# Patient Record
Sex: FEMALE | Race: WHITE | NOT HISPANIC OR LATINO | Employment: UNEMPLOYED | URBAN - METROPOLITAN AREA
[De-identification: names, ages, dates, MRNs, and addresses within clinical notes are randomized per-mention and may not be internally consistent; named-entity substitution may affect disease eponyms.]

---

## 2018-01-15 ENCOUNTER — TELEPHONE (OUTPATIENT)
Dept: FAMILY MEDICINE | Facility: CLINIC | Age: 28
End: 2018-01-15

## 2018-01-15 NOTE — TELEPHONE ENCOUNTER
1/15/2018    Call Regarding ReattributionPhysical    Attempt 1    Message     Comments: NOT ACCEPTING CALLS AT THIS TIME           Outreach   AT

## 2019-01-23 ENCOUNTER — RECORDS - HEALTHEAST (OUTPATIENT)
Dept: ADMINISTRATIVE | Facility: OTHER | Age: 29
End: 2019-01-23

## 2019-01-31 ENCOUNTER — COMMUNICATION - HEALTHEAST (OUTPATIENT)
Dept: TELEHEALTH | Facility: CLINIC | Age: 29
End: 2019-01-31

## 2019-01-31 ENCOUNTER — HOSPITAL ENCOUNTER (OUTPATIENT)
Dept: ULTRASOUND IMAGING | Facility: CLINIC | Age: 29
Discharge: HOME | End: 2019-01-31

## 2019-01-31 DIAGNOSIS — O22.20: ICD-10-CM

## 2019-03-21 ENCOUNTER — HOSPITAL ENCOUNTER (OUTPATIENT)
Dept: OBGYN | Facility: CLINIC | Age: 29
Discharge: HOME | End: 2019-03-21
Attending: OBSTETRICS & GYNECOLOGY | Admitting: OBSTETRICS & GYNECOLOGY

## 2019-03-21 LAB — FETAL RBC % LFV: 0 %

## 2019-04-03 ENCOUNTER — ANESTHESIA - HEALTHEAST (OUTPATIENT)
Dept: OBGYN | Facility: CLINIC | Age: 29
End: 2019-04-03

## 2019-04-05 ENCOUNTER — COMMUNICATION - HEALTHEAST (OUTPATIENT)
Dept: OBGYN | Facility: CLINIC | Age: 29
End: 2019-04-05

## 2019-04-09 ENCOUNTER — COMMUNICATION - HEALTHEAST (OUTPATIENT)
Dept: OBGYN | Facility: CLINIC | Age: 29
End: 2019-04-09

## 2019-08-05 ENCOUNTER — RECORDS - HEALTHEAST (OUTPATIENT)
Dept: LAB | Facility: CLINIC | Age: 29
End: 2019-08-05

## 2019-08-05 LAB — TSH SERPL DL<=0.005 MIU/L-ACNC: 1.29 UIU/ML (ref 0.3–5)

## 2021-05-27 NOTE — ANESTHESIA PREPROCEDURE EVALUATION
Anesthesia Evaluation      Patient summary reviewed   No history of anesthetic complications     Airway    Pulmonary    (+) asthma                           Cardiovascular   (+) hypertension, ,      Neuro/Psych - negative ROS     Endo/Other    (+) pregnant     GI/Hepatic/Renal - negative ROS           Dental              Question of HELLP upon admission, platelets fine and BP has come nder control (per RN)    Patient requesting labor epidural, chart reviewed.  Discussed risks including hypotension, nerve damage, infection, and post dural puncture headache.  Patient understands, questions answered, and agrees to proceed.  Time out instituted prior to placement. Hands washed, sterile gloves and mask worn.               Anesthesia Plan  Planned anesthetic: epidural    ASA 3     Anesthetic plan and risks discussed with: patient

## 2021-05-27 NOTE — ANESTHESIA PROCEDURE NOTES
Epidural Block    Patient location during procedure: OB  Time Called: 4/3/2019 1:59 AM  Reason for Block:labor epidural  Staffing:  Performing  Anesthesiologist: Sarwat Kent MD  Preanesthetic Checklist  Completed: patient identified, risks, benefits, and alternatives discussed, timeout performed, consent obtained, at patient's request, airway assessed, oxygen available, suction available, emergency drugs available and hand hygiene performed  Procedure  Patient position: sitting  Prep: ChloraPrep  Patient monitoring: blood pressure, heart rate and continuous pulse oximetry  Approach: midline  Location: L3-L4  Injection technique: KIRBY saline (PFNS)  Number of Attempts:1  Needle  Needle type: [employer]virgie   Needle gauge: 18 G     Catheter in Space: 4      Additional Notes:  Negative CSF, heme, paresthesia

## 2021-05-27 NOTE — ANESTHESIA POSTPROCEDURE EVALUATION
Patient: Amada Melo  * No procedures listed *  Anesthesia type: epidural    Patient location: PACU  Last vitals:   Vitals:    04/03/19 1536   BP: 156/87   Pulse: 86   Resp: 16   Temp: 36.7  C (98  F)   SpO2:      Post vital signs: stable  Level of consciousness: awake and responds to simple questions  Post-anesthesia pain: pain controlled  Post-anesthesia nausea and vomiting: no  Pulmonary: unassisted, return to baseline  Cardiovascular: stable and blood pressure at baseline  Hydration: adequate  Anesthetic events: no    QCDR Measures:  ASA# 11 - Aggie-op Cardiac Arrest: ASA11B - Patient did NOT experience unanticipated cardiac arrest  ASA# 12 - Aggie-op Mortality Rate: ASA12B - Patient did NOT die  ASA# 13 - PACU Re-Intubation Rate: ASA13B - Patient did NOT require a new airway mgmt  ASA# 10 - Composite Anes Safety: ASA10A - No serious adverse event    Additional Notes:

## 2021-05-27 NOTE — TELEPHONE ENCOUNTER
Location   NURSERY  Provider Beatrice Tay MD    :   N/A    Language:   English    Discharge Follow-Up:  Follow-Up call by Outreach nurse: Call placed    Type of Delivery:      Feeding Method:  Breastfeeding/Formula    Feedings in 24Hrs:  >8x/Day    Breast engorgement:  No    Nipple tenderness:   No    Non-nursing engorgement discussed:   N/A    Amount of Feedin-2 oz    Number of Infant Stools in 24 hours:   >3    Stool:  Transition    Number of Infant voids in 24 hours:  >3    Urine:  Clear    Infant Jaundice:   None    Discussed increasing s/s of Jaundice:   Yes    Circumcision:   N/A    Maternal s/s of infection:   None    Maternal s/s of PIH:   WDL    Postpartum cramping:   Mild    Comfort:  Adequate with routine pain meds    Vaginal Bleeding:  WDL    S/S of Maternal Thrombosis:  None    Maternal BM Since Delivery:  Yes    Referrals:   None    Resources Used During Phone Call:  HEPS    Comments:   Spoke with pt/mom and she states things are going well. NB went to the Dr today and mom states there were no concerns. BF well. Mom states she supplemented over night x1. No concerns with jaundice. NB is voiding/stooling. Mom denies any concerns for herself. Edu discussed. Questions answered.    Completed by:   Caroline Fontaine RN

## 2021-05-27 NOTE — TELEPHONE ENCOUNTER
:   N/A    Language:   English    Discharge Follow-Up:  Follow-Up call by Outreach nurse: Call placed    Type of Delivery:      Feeding Method:  Breastfeeding/Formula    Feedings in 24Hrs:  >8x/Day    Breast engorgement:   No    Nipple tenderness:   Yes    Non-nursing engorgement discussed:   N/A    Amount of Feedin-2 oz    Number of Infant Stools in 24 hours:   >3    Stool:  Normal    Number of Infant voids in 24 hours:  >3    Urine:  Clear    Infant Jaundice:   None    Discussed increasing s/s of Jaundice:   Yes    Circumcision:   N/A    Maternal s/s of infection:   None    Maternal s/s of PIH:   WDL    Postpartum cramping:   None    Comfort:  Adequate with routine pain meds    Vaginal Bleeding:  WDL    S/S of Maternal Thrombosis:  None    Maternal BM Since Delivery:  Yes    Referrals:   None    Resources Used During Phone Call:  HEPS    Comments:   Mom states that things are going well. BF/ROSA MARIA feeding. NO concerns with jaundice. Mom states she is feeling well. Voiding/stooling. Edu discussed. Questions answered    Completed by:   Caroline Fontaine RN

## 2021-06-01 ENCOUNTER — RECORDS - HEALTHEAST (OUTPATIENT)
Dept: ADMINISTRATIVE | Facility: CLINIC | Age: 31
End: 2021-06-01

## 2021-06-02 ENCOUNTER — RECORDS - HEALTHEAST (OUTPATIENT)
Dept: ADMINISTRATIVE | Facility: CLINIC | Age: 31
End: 2021-06-02

## 2021-06-25 NOTE — PLAN OF CARE
[place] [facility] [place] was instructed to come in for evaluation by [facility] following a fall at 0900 today. States she slipped on the ice while picking her son up from school. She fell on her hip/back and says she had no abdominal contact. Reports no change in contractions from the occasional one she is used to feeling at 37 weeks. No leaking fluid, no bleeding. Normal fetal movement. Dr. Braxton updated. Plan to obtain Kleihauer-Betke and discharge to [facility] for BPP. Pt in agreement with plan.

## 2021-08-08 ENCOUNTER — HEALTH MAINTENANCE LETTER (OUTPATIENT)
Age: 31
End: 2021-08-08

## 2021-10-04 ENCOUNTER — HEALTH MAINTENANCE LETTER (OUTPATIENT)
Age: 31
End: 2021-10-04

## 2022-09-11 ENCOUNTER — HEALTH MAINTENANCE LETTER (OUTPATIENT)
Age: 32
End: 2022-09-11

## 2022-11-10 ENCOUNTER — HOSPITAL ENCOUNTER (EMERGENCY)
Facility: HOSPITAL | Age: 32
Discharge: HOME | End: 2022-11-10
Attending: EMERGENCY MEDICINE | Admitting: EMERGENCY MEDICINE
Payer: COMMERCIAL

## 2022-11-10 ENCOUNTER — APPOINTMENT (OUTPATIENT)
Dept: RADIOLOGY | Facility: HOSPITAL | Age: 32
End: 2022-11-10
Attending: EMERGENCY MEDICINE
Payer: COMMERCIAL

## 2022-11-10 VITALS
WEIGHT: 250 LBS | HEART RATE: 99 BPM | HEIGHT: 71 IN | SYSTOLIC BLOOD PRESSURE: 139 MMHG | OXYGEN SATURATION: 97 % | RESPIRATION RATE: 20 BRPM | DIASTOLIC BLOOD PRESSURE: 84 MMHG | TEMPERATURE: 98.5 F | BODY MASS INDEX: 35 KG/M2

## 2022-11-10 DIAGNOSIS — J10.1 INFLUENZA A: ICD-10-CM

## 2022-11-10 DIAGNOSIS — J21.0 RSV BRONCHIOLITIS: ICD-10-CM

## 2022-11-10 LAB
FLUAV RNA SPEC QL NAA+PROBE: POSITIVE
FLUBV RNA RESP QL NAA+PROBE: NEGATIVE
HCG SERPL QL: POSITIVE
RSV RNA SPEC NAA+PROBE: POSITIVE
SARS-COV-2 RNA RESP QL NAA+PROBE: NEGATIVE

## 2022-11-10 PROCEDURE — 99284 EMERGENCY DEPT VISIT MOD MDM: CPT | Mod: CS,25

## 2022-11-10 PROCEDURE — 71045 X-RAY EXAM CHEST 1 VIEW: CPT

## 2022-11-10 PROCEDURE — 84703 CHORIONIC GONADOTROPIN ASSAY: CPT | Performed by: EMERGENCY MEDICINE

## 2022-11-10 PROCEDURE — 36415 COLL VENOUS BLD VENIPUNCTURE: CPT | Performed by: EMERGENCY MEDICINE

## 2022-11-10 PROCEDURE — 87637 SARSCOV2&INF A&B&RSV AMP PRB: CPT | Performed by: EMERGENCY MEDICINE

## 2022-11-10 PROCEDURE — C9803 HOPD COVID-19 SPEC COLLECT: HCPCS

## 2022-11-10 RX ORDER — GUAIFENESIN 600 MG/1
1200 TABLET, EXTENDED RELEASE ORAL 2 TIMES DAILY
Qty: 15 TABLET | Refills: 0 | Status: SHIPPED | OUTPATIENT
Start: 2022-11-10 | End: 2022-12-18

## 2022-11-10 NOTE — ED TRIAGE NOTES
Patient says that her family has been sick off and on since September.  Pt has had flu symptoms with cough and fever this time for 3 days.  Did have a + pregnancy test a week ago.  Last period she remembers was end of August.  Unsure how far along she is--her partner has had a vasectomy.      Triage Assessment     Row Name 11/10/22 1126       Triage Assessment (Adult)    Airway WDL WDL       Respiratory WDL    Respiratory WDL X;cough       Skin Circulation/Temperature WDL    Skin Circulation/Temperature WDL WDL       Cardiac WDL    Cardiac WDL WDL       Peripheral/Neurovascular WDL    Peripheral Neurovascular WDL WDL       Cognitive/Neuro/Behavioral WDL    Cognitive/Neuro/Behavioral WDL WDL

## 2022-11-10 NOTE — ED PROVIDER NOTES
EMERGENCY DEPARTMENT ENCOUNTER      NAME: Amada Melo  AGE: 31 year old female  YOB: 1990  MRN: [id number]  EVALUATION DATE & TIME: 11/10/2022  1:28 PM    PCP: No Ref-Primary, Physician    ED PROVIDER: Joe Moore D.O.      Chief Complaint   Patient presents with     Flu Symptoms       FINAL IMPRESSION:  1. Influenza A    2. RSV bronchiolitis        ED COURSE & MEDICAL DECISION MAKIN:52 PM I met with the patient to gather history and to perform my initial exam. I discussed the plan for care while in the Emergency Department.  2:09 PM Patient is set to be discharged. Patient notes that she has albuterol at home and didn't want any.        Pertinent Labs & Imaging studies reviewed. (See chart for details)  31 year old female presents to the Emergency Department for evaluation of cough, body aches, sore throat, fevers.  Patient is known to be pregnant, and is slightly nauseous, though she does report this is, with her pregnancy.  She was not tachycardic on my exam, and her lung sounds were clear.  There is no clinical concern for pneumonia, and her chest x-ray was also unremarkable.  Swab testing does show influenza a as well as RSV positive testing with negative testing for influenza B and COVID-19.  At this time I do suspect that it is influenza potentially concomitantly with the RSV resulting in her symptoms.  I do not believe further intervention is indicated.  If she is pregnant, cannot discharge with guaifenesin with codeine, but did discharge with the guaifenesin.  She does have albuterol at home for symptomatic relief.  She is outside the window for Tamiflu.  She will follow-up as an outpatient with her OB/GYN.  Return precautions discussed.        At the conclusion of the encounter I discussed the results of all of the tests and the disposition. The questions were answered. The patient or family acknowledged understanding and was agreeable with the care plan.        HPI    Patient  information was obtained from: Patient     Use of : N/A      Amadabarbara Melo is a 31 year old female with a history of asthma who presents to the ED via walk in for the evaluation of flu symptoms.     The patient started feeling sick 3-4 days ago with a fever, cough, congestion and sore throat.  She also endorse nausea, vomiting and diarrhea along with a rash in her arms. She notes that she's been intermittently sick for the past month. She is currently 11-12 weeks pregnant and had her last period in August.         REVIEW OF SYSTEMS  Constitutional:  Denies chills, weight loss or weakness. Endorses fever.   Eyes:  No pain, discharge, redness  HENT:  Denies ear pain. Endorses sore throat and congestion.   Respiratory: No SOB, wheeze. Endorses cough  Cardiovascular:  No CP, palpitations  GI:  Denies abdominal pain. Endorses nausea, vomiting, and diarrhea.  : Denies dysuria, hematuria  Musculoskeletal:  Denies any new muscle/joint pain, swelling or loss of function.  Skin:  Denies pallor. Endorses rash.   Neurologic:  Denies headache, focal weakness or sensory changes  Lymph: Denies swollen nodes    All other systems negative unless noted in HPI.    PAST MEDICAL HISTORY:  Past Medical History:   Diagnosis Date     ADD (attention deficit disorder)      Genital herpes      PTSD (post-traumatic stress disorder)      Uncomplicated asthma        PAST SURGICAL HISTORY:  Past Surgical History:   Procedure Laterality Date     ENT SURGERY       TONSILLECTOMY       WISDOM TOOTH EXTRACTION           CURRENT MEDICATIONS:    No current facility-administered medications for this encounter.     Current Outpatient Medications   Medication     guaiFENesin (MUCINEX) 600 MG 12 hr tablet     albuterol (PROAIR HFA, PROVENTIL HFA, VENTOLIN HFA) 108 (90 BASE) MCG/ACT inhaler         ALLERGIES:  Allergies   Allergen Reactions     Penicillins      Steri Strips        FAMILY HISTORY:  Family History   Problem Relation Age of  "Onset     Depression Mother      Depression Father      Asthma Mother      Cervical Cancer Mother      Cerebrovascular Disease Maternal Grandmother      Hypercalcemia Paternal Grandmother      Hypertension Paternal Grandmother        SOCIAL HISTORY:  Social History     Socioeconomic History     Marital status: Legally      Spouse name: seperated for 4 years     Number of children: 1   Occupational History     Occupation: unemployed     Employer: [employer]   Tobacco Use     Smoking status: Former     Packs/day: 0.50     Types: Cigarettes     Quit date: 2018     Years since quittin.1     Smokeless tobacco: Never     Tobacco comments:     5 cigs per day---also does e-cig   Substance and Sexual Activity     Alcohol use: Yes     Alcohol/week: 0.0 standard drinks     Comment: Alcoholic Drinks/day: \"sometimes\" - not in pregnancy     Drug use: No     Sexual activity: Not Currently       VITALS:  Patient Vitals for the past 24 hrs:   BP Temp Temp src Pulse Resp SpO2 Height Weight   11/10/22 1125 (!) 143/93 98.5  F (36.9  C) Oral 113 18 98 % 1.803 m (5' 11\") 113.4 kg (250 lb)       PHYSICAL EXAM    VITAL SIGNS: BP (!) 143/93   Pulse 113   Temp 98.5  F (36.9  C) (Oral)   Resp 18   Ht 1.803 m (5' 11\")   Wt 113.4 kg (250 lb)   SpO2 98%   BMI 34.87 kg/m      General Appearance: Well-nourished, no acute distress. Non-toxic appearing.   Head:  Normocephalic, without obvious abnormality, atraumatic  Eyes:  PERRL, conjunctiva/corneas clear, EOM's intact,  ENT:  Lips, mucosa, and tongue normal, membranes are moist without pallor  Neck:  Normal ROM, symmetrical, trachea midline    Chest:  No tenderness or deformity, no crepitus  Cardio:  Regular rate and rhythm, no murmur, rub or gallop, 2+ pulses symmetric in all extremities  Pulm:  Clear to auscultation bilaterally, respirations unlabored,  Back:  ROM normal, no CVA tenderness, no spinal tenderness, no paraspinal tenderness  Abdomen:  Soft, non-tender, no " rebound or guarding.  Musculoskeletal: Full ROM, no edema, no cyanosis, good ROM of major joints  Integument:  Warm, Dry, No erythema, No rash.    Neurologic:  Alert & oriented.  No focal deficits appreciated.  Ambulatory.  Psychiatric:  Affect normal, Judgment normal, Mood normal.      LABS  Results for orders placed or performed during the hospital encounter of 11/10/22 (from the past 24 hour(s))   Symptomatic; Unknown Influenza A/B & SARS-CoV2 (COVID-19) Virus PCR Multiplex Nasopharyngeal    Specimen: Nasopharyngeal; Swab   Result Value Ref Range    Influenza A PCR Positive (A) Negative    Influenza B PCR Negative Negative    RSV PCR Positive (A) Negative    SARS CoV2 PCR Negative Negative    Narrative    Testing was performed using the Xpert Xpress CoV2/Flu/RSV Assay on the [employer] Instrument. This test should be ordered for the detection of SARS-CoV-2 and influenza viruses in individuals who meet clinical and/or epidemiological criteria. Test performance is unknown in asymptomatic patients. This test is for in vitro diagnostic use under the FDA EUA for laboratories certified under CLIA to perform high or moderate complexity testing. This test has not been FDA cleared or approved. A negative result does not rule out the presence of PCR inhibitors in the specimen or target RNA in concentration below the limit of detection for the assay. If only one viral target is positive but coinfection with multiple targets is suspected, the sample should be re-tested with another FDA cleared, approved, or authorized test, if coinfection would change clinical management. This test was validated by the [facility]. These laboratories are certified under the Clinical Laboratory Improvement Amendments of 1988 (CLIA-88) as qualified to perform high complexity laboratory testing.   XR Chest 1 View    Narrative    EXAM: XR CHEST 1 VIEW  LOCATION: [facility]  DATE/TIME: 11/10/2022  12:43 PM    INDICATION: Cough for one week. Pregnancy.  COMPARISON: 10/07/2010.      Impression    IMPRESSION: Negative chest. Lungs are clear. Normal heart size.       HCG QUALitative pregnancy (blood)   Result Value Ref Range    hCG Serum Qualitative Positive (A) Negative         RADIOLOGY  XR Chest 1 View   Final Result   IMPRESSION: Negative chest. Lungs are clear. Normal heart size.                   MEDICATIONS GIVEN IN THE EMERGENCY:  Medications - No data to display    NEW PRESCRIPTIONS STARTED AT TODAY'S ER VISIT  New Prescriptions    GUAIFENESIN (MUCINEX) 600 MG 12 HR TABLET    Take 2 tablets (1,200 mg) by mouth 2 times daily        I, Leonard Aj, am serving as a scribe to document services personally performed by Jeo Moore D.O. based on my observation and the provider's statements to me. IJoe D.O. attest that Leonard Aj is acting in a scribe capacity, has observed my performance of the services and has documented them in accordance with my direction.    Joe Moore D.O.  Emergency Medicine  [facility] EMERGENCY DEPARTMENT  [address] [place] [id number]  [phone]  Dept: [phone]     Joe Moore DO  11/10/22 9741

## 2022-11-15 ENCOUNTER — LAB REQUISITION (OUTPATIENT)
Dept: LAB | Facility: CLINIC | Age: 32
End: 2022-11-15

## 2022-11-15 ENCOUNTER — MEDICAL CORRESPONDENCE (OUTPATIENT)
Dept: HEALTH INFORMATION MANAGEMENT | Facility: CLINIC | Age: 32
End: 2022-11-15

## 2022-11-15 ENCOUNTER — TRANSFERRED RECORDS (OUTPATIENT)
Dept: MATERNAL FETAL MEDICINE | Facility: HOSPITAL | Age: 32
End: 2022-11-15

## 2022-11-15 DIAGNOSIS — Z34.81 ENCOUNTER FOR SUPERVISION OF OTHER NORMAL PREGNANCY, FIRST TRIMESTER: ICD-10-CM

## 2022-11-15 LAB
ABO/RH(D): NORMAL
ANTIBODY SCREEN: NEGATIVE
BASOPHILS # BLD AUTO: 0 10E3/UL (ref 0–0.2)
BASOPHILS NFR BLD AUTO: 0 %
EOSINOPHIL # BLD AUTO: 0.1 10E3/UL (ref 0–0.7)
EOSINOPHIL NFR BLD AUTO: 2 %
ERYTHROCYTE [DISTWIDTH] IN BLOOD BY AUTOMATED COUNT: 11.9 % (ref 10–15)
HCT VFR BLD AUTO: 35.6 % (ref 35–47)
HGB BLD-MCNC: 12.2 G/DL (ref 11.7–15.7)
IMM GRANULOCYTES # BLD: 0 10E3/UL
IMM GRANULOCYTES NFR BLD: 0 %
LYMPHOCYTES # BLD AUTO: 1.2 10E3/UL (ref 0.8–5.3)
LYMPHOCYTES NFR BLD AUTO: 27 %
MCH RBC QN AUTO: 31.3 PG (ref 26.5–33)
MCHC RBC AUTO-ENTMCNC: 34.3 G/DL (ref 31.5–36.5)
MCV RBC AUTO: 91 FL (ref 78–100)
MONOCYTES # BLD AUTO: 0.4 10E3/UL (ref 0–1.3)
MONOCYTES NFR BLD AUTO: 9 %
NEUTROPHILS # BLD AUTO: 2.8 10E3/UL (ref 1.6–8.3)
NEUTROPHILS NFR BLD AUTO: 62 %
NRBC # BLD AUTO: 0 10E3/UL
NRBC BLD AUTO-RTO: 0 /100
PLAT MORPH BLD: NORMAL
PLATELET # BLD AUTO: 169 10E3/UL (ref 150–450)
RBC # BLD AUTO: 3.9 10E6/UL (ref 3.8–5.2)
RBC MORPH BLD: NORMAL
SPECIMEN EXPIRATION DATE: NORMAL
WBC # BLD AUTO: 4.6 10E3/UL (ref 4–11)

## 2022-11-15 PROCEDURE — 87389 HIV-1 AG W/HIV-1&-2 AB AG IA: CPT | Performed by: NURSE PRACTITIONER

## 2022-11-15 PROCEDURE — 86762 RUBELLA ANTIBODY: CPT | Performed by: NURSE PRACTITIONER

## 2022-11-15 PROCEDURE — 86780 TREPONEMA PALLIDUM: CPT | Performed by: NURSE PRACTITIONER

## 2022-11-15 PROCEDURE — 86850 RBC ANTIBODY SCREEN: CPT | Performed by: NURSE PRACTITIONER

## 2022-11-15 PROCEDURE — 85025 COMPLETE CBC W/AUTO DIFF WBC: CPT | Performed by: NURSE PRACTITIONER

## 2022-11-15 PROCEDURE — 86901 BLOOD TYPING SEROLOGIC RH(D): CPT | Performed by: NURSE PRACTITIONER

## 2022-11-15 PROCEDURE — 86803 HEPATITIS C AB TEST: CPT | Performed by: NURSE PRACTITIONER

## 2022-11-15 PROCEDURE — 87340 HEPATITIS B SURFACE AG IA: CPT | Performed by: NURSE PRACTITIONER

## 2022-11-16 ENCOUNTER — HOSPITAL ENCOUNTER (EMERGENCY)
Facility: HOSPITAL | Age: 32
Discharge: HOME | End: 2022-11-16
Admitting: STUDENT IN AN ORGANIZED HEALTH CARE EDUCATION/TRAINING PROGRAM
Payer: COMMERCIAL

## 2022-11-16 ENCOUNTER — APPOINTMENT (OUTPATIENT)
Dept: RADIOLOGY | Facility: HOSPITAL | Age: 32
End: 2022-11-16
Attending: PHYSICIAN ASSISTANT
Payer: COMMERCIAL

## 2022-11-16 VITALS
DIASTOLIC BLOOD PRESSURE: 80 MMHG | RESPIRATION RATE: 16 BRPM | OXYGEN SATURATION: 100 % | BODY MASS INDEX: 34.87 KG/M2 | TEMPERATURE: 98.1 F | WEIGHT: 250 LBS | HEART RATE: 85 BPM | SYSTOLIC BLOOD PRESSURE: 138 MMHG

## 2022-11-16 DIAGNOSIS — L03.032 PARONYCHIA OF TOE, LEFT: ICD-10-CM

## 2022-11-16 DIAGNOSIS — M79.675 GREAT TOE PAIN, LEFT: ICD-10-CM

## 2022-11-16 LAB
HBV SURFACE AG SERPL QL IA: NONREACTIVE
HCV AB SERPL QL IA: NONREACTIVE
HIV 1+2 AB+HIV1 P24 AG SERPL QL IA: NONREACTIVE
RUBV IGG SERPL QL IA: 2.48 INDEX
RUBV IGG SERPL QL IA: POSITIVE
T PALLIDUM AB SER QL: NONREACTIVE

## 2022-11-16 PROCEDURE — 10060 I&D ABSCESS SIMPLE/SINGLE: CPT

## 2022-11-16 PROCEDURE — 73630 X-RAY EXAM OF FOOT: CPT | Mod: LT

## 2022-11-16 PROCEDURE — 250N000013 HC RX MED GY IP 250 OP 250 PS 637: Performed by: PHYSICIAN ASSISTANT

## 2022-11-16 PROCEDURE — 99283 EMERGENCY DEPT VISIT LOW MDM: CPT

## 2022-11-16 RX ORDER — OXYCODONE HYDROCHLORIDE 5 MG/1
5 TABLET ORAL ONCE
Status: COMPLETED | OUTPATIENT
Start: 2022-11-16 | End: 2022-11-16

## 2022-11-16 RX ORDER — ACETAMINOPHEN 325 MG/1
650 TABLET ORAL ONCE
Status: COMPLETED | OUTPATIENT
Start: 2022-11-16 | End: 2022-11-16

## 2022-11-16 RX ORDER — CEPHALEXIN 500 MG/1
500 CAPSULE ORAL 4 TIMES DAILY
Qty: 28 CAPSULE | Refills: 0 | Status: SHIPPED | OUTPATIENT
Start: 2022-11-16 | End: 2022-11-23

## 2022-11-16 RX ORDER — CEPHALEXIN 500 MG/1
500 CAPSULE ORAL ONCE
Status: COMPLETED | OUTPATIENT
Start: 2022-11-16 | End: 2022-11-16

## 2022-11-16 RX ADMIN — ACETAMINOPHEN 650 MG: 325 TABLET, FILM COATED ORAL at 21:19

## 2022-11-16 RX ADMIN — OXYCODONE HYDROCHLORIDE 5 MG: 5 TABLET ORAL at 21:58

## 2022-11-16 RX ADMIN — CEPHALEXIN 500 MG: 500 CAPSULE ORAL at 21:58

## 2022-11-16 ASSESSMENT — ENCOUNTER SYMPTOMS: FEVER: 0

## 2022-11-16 NOTE — Clinical Note
Amada Melo was seen and treated in our emergency department on 11/16/2022.  She may return to work on 11/19/2022.       If you have any questions or concerns, please don't hesitate to call.      Tess Hough PA-C

## 2022-11-17 NOTE — ED NOTES
Patient was given pain medicine and returned to the waiting room out of this RN's care in stable condition.

## 2022-11-17 NOTE — ED TRIAGE NOTES
Pt tripped on a pumpkin tonight and got some of it stuck underneath her big left toe. States it is difficult and painful to walk.

## 2022-11-17 NOTE — ED PROVIDER NOTES
EMERGENCY DEPARTMENT ENCOUNTER      NAME: Amada Melo  AGE: 31 year old female  YOB: 1990  MRN: [id number]  EVALUATION DATE & TIME: No admission date for patient encounter.    PCP: No Ref-Primary, Physician    ED PROVIDER: Tess Hough PA-C      Chief Complaint   Patient presents with     Toe Pain         FINAL IMPRESSION:  1. Great toe pain, left    2. Paronychia of toe, left          ED COURSE & MEDICAL DECISION MAKING:    I introduced myself to patient, performed initial HPI and examination.   9:49 PM Performed digital block and examined for foreign body; Plan for one dose oral oxycodone and antibiotics (Keflex) and discharge with further prescription of antibiotics to be picked up tomorrow.     31 year old female with PMH of PTSD and anxiety presents to the Emergency Department for evaluation of toe pain. Patient accidentally kicked a pumpkin yesterday, removed a sliver from it. Has been having worsening pain since that time. \Incidentally patient is 10 week pregnant.     Differential includes fracture, contusion, nail injury, retained foreign body, infection/paronychia.    Exam with significantly tender left great toe, nail is intact with no obvious foreign body, slight crusting at distal nail. No notable fluctuance. Does have faint erythema extending up lateral toe. ROM intact.   XR shows no obvious foreign body, no fracture.     Digital block was performed and tweezers used to brush along distal nail. Small amount of debris was removed followed by purulent drainage. Small amount of purulence was expressed to the best of my ability with palpation. No notable fluctuance and patient would not benefit from full I&D at this time. Will discharge with antibiotics with concern for developing cellulitis.     Instructed on at home supportive management with warm, soapy soaks. Instructed on close follow up with PCP for re-check and red flags/indications to return to the emergency department.  First dose of antibiotics and one dose of oxycodone was given to the patient in the ED. Will not be discharged with oxycodone.    MEDICATIONS GIVEN IN THE EMERGENCY:  Medications   acetaminophen (TYLENOL) tablet 650 mg (650 mg Oral Given 11/16/22 2119)   oxyCODONE (ROXICODONE) tablet 5 mg (5 mg Oral Given 11/16/22 2158)   cephALEXin (KEFLEX) capsule 500 mg (500 mg Oral Given 11/16/22 2158)       NEW PRESCRIPTIONS STARTED AT TODAY'S ER VISIT  New Prescriptions    CEPHALEXIN (KEFLEX) 500 MG CAPSULE    Take 1 capsule (500 mg) by mouth 4 times daily for 7 days          =================================================================    HPI    Patient information was obtained from: Patient    Use of : N/A       Amada SANGEETA Melo is a 31 year old female with a pertinent history of PTSD and anxiety who presents to this ED via wheelchair for evaluation of toe pain.    Patient kicked a pumpkin 24 hours ago, having the worst pain she has felt in her toe since then. Using a post-op shoe which she had from a prior injury, no improvement. Does report pulling a sliver of pumpkin from underneath her toenail.    Patient is 10 weeks pregnant. Has not taken any pain medicine.   Allergy to Amoxicillin.        REVIEW OF SYSTEMS   Review of Systems   Constitutional: Negative for fever.   Musculoskeletal:        Left great toe pain   Skin:        Left great toenail injury   All other systems reviewed and are negative.       PAST MEDICAL HISTORY:  Past Medical History:   Diagnosis Date     ADD (attention deficit disorder)      Genital herpes      PTSD (post-traumatic stress disorder)      Uncomplicated asthma        PAST SURGICAL HISTORY:  Past Surgical History:   Procedure Laterality Date     ENT SURGERY       TONSILLECTOMY       WISDOM TOOTH EXTRACTION         CURRENT MEDICATIONS:    cephALEXin (KEFLEX) 500 MG capsule  albuterol (PROAIR HFA, PROVENTIL HFA, VENTOLIN HFA) 108 (90 BASE) MCG/ACT inhaler  guaiFENesin (MUCINEX)  "600 MG 12 hr tablet        ALLERGIES:  Allergies   Allergen Reactions     Penicillins      Steri Strips        FAMILY HISTORY:  Family History   Problem Relation Age of Onset     Depression Mother      Depression Father      Asthma Mother      Cervical Cancer Mother      Cerebrovascular Disease Maternal Grandmother      Hypercalcemia Paternal Grandmother      Hypertension Paternal Grandmother        SOCIAL HISTORY:   Social History     Socioeconomic History     Marital status: Legally      Spouse name: seperated for 4 years     Number of children: 1   Occupational History     Occupation: unemployed     Employer: [employer]   Tobacco Use     Smoking status: Former     Packs/day: 0.50     Types: Cigarettes     Quit date: 2018     Years since quittin.2     Smokeless tobacco: Never     Tobacco comments:     5 cigs per day---also does e-cig   Substance and Sexual Activity     Alcohol use: Yes     Alcohol/week: 0.0 standard drinks     Comment: Alcoholic Drinks/day: \"sometimes\" - not in pregnancy     Drug use: No     Sexual activity: Not Currently       VITALS:  BP (!) 140/84   Pulse 96   Temp 97.6  F (36.4  C) (Temporal)   Resp 18   Wt 113.4 kg (250 lb)   SpO2 100%   BMI 34.87 kg/m      PHYSICAL EXAM    Constitutional: Well developed, Well nourished, NAD, GCS 15   HENT: Normocephalic, Atraumatic  Neck- Full ROM  Eyes: Conjunctiva normal.   Respiratory: Speaking in full sentences, No respiratory distress  Cardiovascular: Normal heart rate, Regular rhythm. Dorsalis pedis and posterior tibialis pulses intact and symmetric  Musculoskeletal: No deformities, No notable swelling to left great toe; ROM intact. Significant tenderness to palpation.   Integument: Erythema to distal left toe. Slight crusting to distal nail. Subtle erythematous streak along the medial toe. No fluctuance or active drainage.   Neurologic: Alert & oriented x 3, Normal sensory function. No focal deficits.   Psychiatric: Affect " normal, Judgment normal, Mood normal. Cooperative.      LAB:  All pertinent labs reviewed and interpreted.  Results for orders placed or performed during the hospital encounter of 11/16/22   Foot XR, G/E 3 views, left    Impression    IMPRESSION: Normal joint spaces and alignment. No fracture.       RADIOLOGY:  Reviewed all pertinent imaging. Please see official radiology report.  Foot XR, G/E 3 views, left   Final Result   IMPRESSION: Normal joint spaces and alignment. No fracture.          PROCEDURES:     PROCEDURE: Digital Block   INDICATIONS: Left great toe injury   PROCEDURE PROVIDER: Tess Hough PA-C     SITE: Left great toe   MEDICATION: 1% Lidocaine   NOTE: The skin overlying the site for injection was prepped with chlorhexidine.  Needle was inserted in a standard three point injection pattern.  Each time the area was aspirated and there was no return of blood.  I then injected the medication at the base of the digit.  A total of 3ml of the medication was injected.  The patient had adequate analgesic response to the procedure   COMPLICATIONS: None     PROCEDURE: Incision and Drainage   INDICATIONS: Paronychia   PROCEDURE PROVIDER: Tess Hough PA-C   SITE: Left great toe   MEDICATION: 3 mLs of 1% Lidocaine without epinephrine via digital block as above   NOTE: Local anesthetic was injected subcutaneously with anesthesia effects demonstrated prior to proceeding.  The area of maximal fluctuance was scraped gently using an Adson's with small debris removed and return of small purulent drainage.   COMPLEXITY: Simple    Simple = single, furuncle, paronychia, superficial  Complex = multiple or abscess requiring probing, loculations, packing placement   COMPLICATIONS: Patient tolerated procedure well, without complication           Daylin PIMENTEL, am serving as a scribe to document services personally performed by Tess Hough PA-C based on my observation and the provider's statements to me. Tess PIMENTEL  CASSIE Hough, attest that Daylin Walter is acting in a scribe capacity, has observed my performance of the services and has documented them in accordance with my direction.    Tess Hough PA-C  Emergency Medicine  [facility] EMERGENCY DEPARTMENT  [address] [place] [id number]  [phone]           Tess Hough PA-C  11/16/22 1261

## 2022-11-17 NOTE — DISCHARGE INSTRUCTIONS
Do frequent warm, soapy soaks for the toe.  Try to stay off the foot as much as possible.     Use tylenol as needed for pain.    Start the antibiotics as prescribed: Keflex 500 mg 4 times daily for 7 days. You were given your first dose in the emergency department, next dose will be tomorrow morning.    Follow up in clinic in 2 days for re-check.  Return to the emergency department if you develop fevers, worsening/changing pain, redness, swelling, or any other concerning symptoms. We would be happy to see you.

## 2022-11-21 ENCOUNTER — TRANSCRIBE ORDERS (OUTPATIENT)
Dept: MATERNAL FETAL MEDICINE | Facility: HOSPITAL | Age: 32
End: 2022-11-21

## 2022-11-21 DIAGNOSIS — O26.90 PREGNANCY RELATED CONDITION, ANTEPARTUM: Primary | ICD-10-CM

## 2022-11-30 ENCOUNTER — PRE VISIT (OUTPATIENT)
Dept: MATERNAL FETAL MEDICINE | Facility: CLINIC | Age: 32
End: 2022-11-30

## 2022-12-02 ENCOUNTER — OFFICE VISIT (OUTPATIENT)
Dept: MATERNAL FETAL MEDICINE | Facility: CLINIC | Age: 32
End: 2022-12-02
Attending: NURSE PRACTITIONER
Payer: COMMERCIAL

## 2022-12-02 ENCOUNTER — TRANSFERRED RECORDS (OUTPATIENT)
Dept: HEALTH INFORMATION MANAGEMENT | Facility: CLINIC | Age: 32
End: 2022-12-02

## 2022-12-02 ENCOUNTER — HOSPITAL ENCOUNTER (OUTPATIENT)
Dept: ULTRASOUND IMAGING | Facility: CLINIC | Age: 32
Discharge: HOME | End: 2022-12-02
Attending: NURSE PRACTITIONER
Payer: COMMERCIAL

## 2022-12-02 DIAGNOSIS — O26.90 PREGNANCY RELATED CONDITION, ANTEPARTUM: ICD-10-CM

## 2022-12-02 DIAGNOSIS — Z92.89: ICD-10-CM

## 2022-12-02 DIAGNOSIS — Z36.9 PRENATAL SCREENING ENCOUNTER: Primary | ICD-10-CM

## 2022-12-02 DIAGNOSIS — O09.291 DOWN SYNDROME IN CHILD OF PRIOR PREGNANCY, CURRENTLY PREGNANT IN FIRST TRIMESTER: Primary | ICD-10-CM

## 2022-12-02 DIAGNOSIS — O09.291 DOWN SYNDROME IN CHILD OF PRIOR PREGNANCY, CURRENTLY PREGNANT IN FIRST TRIMESTER: ICD-10-CM

## 2022-12-02 DIAGNOSIS — Z36.9 ANTENATAL SCREENING ENCOUNTER: ICD-10-CM

## 2022-12-02 LAB — MCCMA SPECIMEN: NORMAL

## 2022-12-02 PROCEDURE — 81265 STR MARKERS SPECIMEN ANAL: CPT | Mod: XU | Performed by: OBSTETRICS & GYNECOLOGY

## 2022-12-02 PROCEDURE — 59015 CHORION BIOPSY: CPT

## 2022-12-02 PROCEDURE — 76945 ECHO GUIDE VILLUS SAMPLING: CPT | Mod: 26 | Performed by: OBSTETRICS & GYNECOLOGY

## 2022-12-02 PROCEDURE — 88267 CHROMOSOME ANALYS PLACENTA: CPT

## 2022-12-02 PROCEDURE — 81265 STR MARKERS SPECIMEN ANAL: CPT

## 2022-12-02 PROCEDURE — 96040 HC GENETIC COUNSELING, EACH 30 MINUTES: CPT | Performed by: GENETIC COUNSELOR, MS

## 2022-12-02 PROCEDURE — 250N000011 HC RX IP 250 OP 636: Performed by: OBSTETRICS & GYNECOLOGY

## 2022-12-02 PROCEDURE — 59015 CHORION BIOPSY: CPT | Performed by: OBSTETRICS & GYNECOLOGY

## 2022-12-02 PROCEDURE — 76801 OB US < 14 WKS SINGLE FETUS: CPT

## 2022-12-02 PROCEDURE — 88275 CYTOGENETICS 100-300: CPT

## 2022-12-02 PROCEDURE — 36415 COLL VENOUS BLD VENIPUNCTURE: CPT

## 2022-12-02 PROCEDURE — 250N000009 HC RX 250: Performed by: OBSTETRICS & GYNECOLOGY

## 2022-12-02 PROCEDURE — 76801 OB US < 14 WKS SINGLE FETUS: CPT | Mod: 26 | Performed by: OBSTETRICS & GYNECOLOGY

## 2022-12-02 PROCEDURE — 76945 ECHO GUIDE VILLUS SAMPLING: CPT | Mod: XS

## 2022-12-02 RX ORDER — HEPARIN SODIUM (PORCINE) LOCK FLUSH IV SOLN 100 UNIT/ML 100 UNIT/ML
1 SOLUTION INTRAVENOUS ONCE
Status: COMPLETED | OUTPATIENT
Start: 2022-12-02 | End: 2022-12-02

## 2022-12-02 RX ADMIN — HEPARIN 1 ML: 100 SYRINGE at 10:35

## 2022-12-02 RX ADMIN — LIDOCAINE HYDROCHLORIDE 20 ML: 10 INJECTION, SOLUTION INFILTRATION; PERINEURAL at 10:34

## 2022-12-02 NOTE — PROGRESS NOTES
Please refer to ultrasound report under 'Imaging' Studies of 'Chart Review' tabs.    Diego Zimmerman M.D.

## 2022-12-02 NOTE — PATIENT INSTRUCTIONS
As part of your visit in Maternal Fetal Medicine, you elected to have a chorionic villus sampling (CVS), an invasive diagnostic procedure. The following information was discussed:  CVS is an invasive procedure in which placental villi are obtained for the purpose of chromosome analysis and/or other prenatal genetic analysis. CVS cannot test for open neural tube defects; maternal serum AFP after 15 weeks is recommended  The risk of pregnancy loss association with CVS is generally estimated to be 1/300 to 1/500.   Cramping is very common. It is usually mild and goes away within a few hours. You may take Acetaminophen (Tylenol), if needed.  Avoid strenuous activities for the rest of the day after the CVS is done. This includes heavy lifting, jogging or other exercise.  You should call your care regular obstetric (OB) care provider if you have:  Heavy bleeding (like a period, or more) beyond the day of the test.  Clear fluid (like water) leaking from your vagina.  Severe abdominal (belly) pain.  Flu-like symptoms within two weeks of the test. These include chills, muscle aches or a fever over 100.4 F (38 C) (under the tongue).    The following testing was ordered on the CVS sample:  FISH preliminary analysis with results available in 24-72 hours   Chromosome analysis with results likely taking 7-14 days after the FISH results  Microarray analysis with results likely taking 7-14 days after the FISH results. May take up to 21 days.  Maternal Cell Contamination studies are performed on CVS specimens with microarray testing. For CVS specimens with a normal female karyotype result and no microarray testing, group home study will be performed and resulted separately.  We discussed that FISH results are considered preliminary with chromosome analysis or a microarray result determined final. There is a less than 1% chance for FISH results to be discordant from the results.   Due to the sample being placental in origin, rather than  fetal, there is a less than 1% chance the results will be discordant from the fetus. Given a positive result this chance may be lower if ultrasound anomalies or aneuploidy markers are detected.  Final CVS results are contingent upon the maternal cell contamination results.  Results are not guaranteed    Results will be communicated to you, forwarded to your primary OB provider, and available in [facility].

## 2022-12-02 NOTE — PROGRESS NOTES
[facility]  Genetic Counseling Consult    Patient: Amada Melo YOB: 1990   Date of Service: 2022      Amada Melo was seen at [facility] for genetic consultation to review and consent to chorionic villi sampling in the current pregnancy. The patient was accompanied by father of the baby, Matt, as well as her son, , to today's consult. [facility] RN Rachelle was also present and observed today's consult.       Impression/Plan:   1.  Amada was consented for chorionic villi sampling in the current pregnancy due to her history of a prior pregnancy with Trisomy 21. The following tests were ordered today:     FISH aneuploidy screen ([facility])    Chromosome analysis ([facility])    Microarray analysis ([facility])    Maternal cell contamination studies ([facility])  As results become available in [facility], we will contact the patient to discuss, and a copy will be forwarded to the office of the referring OB provider. Please see the risk assessment section below for additional details. Amada has provided permission for [facility] genetic counseling to call and leave a detailed voicemail for all results as they become available, including fetal sex. The couple are aware the current pregnancy is predicted female, per [facility] NIPT.    2.  The patient underwent an NT ultrasound today in addition to CVS. Please see the ultrasound report for additional details.     3.  Maternal serum AFP (single marker screen) is recommended after 15 weeks to screen for open neural tube defects. A quad screen should not be performed.    Pregnancy History:   /Parity:     Age at Delivery: 32 years old  BOWEN: 2023 via ultrasound    Gestational Age: 12w2d    No significant complications or exposures were reported in the current pregnancy.    Amada s pregnancy history is significant for a prior female pregnancy with Trisomy 21 and large  cystic hygroma. Rolanda terminated this pregnancy in 2018 in light of these findings. She has undergone CVS in subsequent pregnancies to obtain diagnostic information about fetal genetic anomalies. In 2010 and 2019 she had NSVDs of healthy sons. .    Her prior conception affected with Trisomy 21 was with a previous partner, not Matt.     Medical History:   The patient's reported medical history was not reviewed in detail during today's consult.        Family History:   A three-generation pedigree was not obtained as part of today's CVS consent. However, the following significant information was provided by Neville today:     The following significant findings were reported by the patient:    Matt: he was born with a single kidney, but is otherwise in good health.     Matt's daughter from a previous partner, Veronica: she is 6 years old and reported to have been born with scoliosis and an extra toe, otherwise in good health. She has not undergone genetics evaluation or consultation to determine etiology for these findings. The contact information for the [facility] was provided to Matt today at his request so that he can follow-up with Veronica's mother to determine next best steps for his daughter's evaluation.        Risk Assessment for Chromosome Conditions:     We explained that the risk for fetal chromosome abnormalities increases with maternal age. The patient underwent Panorama NIPT earlier in pregnancy, results returned low risk, female.      We reviewed that NIPT is a screening test and does not replace the accuracy obtained from invasive prenatal testing. The patient was offered the option of diagnostic testing. We reviewed the benefits and limitations of chorionic villus sampling and amniocentesis during today's consult. Both procedures have a 1/300-1/500 chance for complications including miscarriage. Both procedures are performed transabdominally at [facility].     The patient  elected to proceed with a chorionic villus sampling (CVS) during today's consult. She was offered reflex testing, however elected to proceed with FISH, chromosome analysis, and microarray analysis on her CVS sample. assisted sample was also collected today as part of her genetic testing. All testing will be completed at [facility].    Chorionic villus sampling (CVS)    Invasive procedure typically performed in the first trimester by which placental villi are obtained for the purpose of chromosome analysis and/or other prenatal genetic analysis    Diagnostic results; >99% sensitivity for fetal chromosome abnormalities    Results are not guaranteed.    Cannot test for open neural tube defects; maternal serum AFP after 15 weeks is recommended    The risk of pregnancy loss association with CVS is generally estimated to be 1/300 to 1/500.     We reviewed the chorionic villus sampling consent form as well as the genetic testing consent form. All questions were answered to the patient's apparent satisfaction. The following testing was ordered on the prenatal sample:     FISH preliminary analysis with results available in 3-4 days.     Chromosome analysis with results likely taking 7-14 days after the FISH results    Microarray analysis with results likely taking 7-14 days after the FISH results. May take up to 21 days.     Maternal Cell Contamination studies are performed on CVS specimens with microarray testing. For CVS specimens with a normal female karyotype result and no microarray testing, assisted study will be performed and resulted separately.    Results:    We discussed that FISH results are considered preliminary with chromosome analysis or a microarray result determined final. There is a less than 1% chance for FISH results to be discordant from the results. Patients are encouraged to wait to make pregnancy decisions until the final results are received but understand that some patients may feel comfortable making those after  FISH given the context.     Due to the sample being placental in origin, rather than fetal, there is a less than 1% chance the results will be discordant from the fetus. Given a positive result this chance may be lower if ultrasound anomalies or aneuploidy markers are detected.    Final CVS results are contingent upon the maternal cell contamination results.    Results will be communicated to the patient, forwarded to the referring provider, and available in [facility].         Testing Options:   We also discussed the following testing/screening options in detail:     Genetic Amniocentesis    Invasive procedure typically performed in the second trimester by which amniotic fluid is obtained for the purpose of chromosome analysis and/or other prenatal genetic analysis    Diagnostic results; >99% sensitivity for fetal chromosome abnormalities    AFAFP measurement tests for open neural tube defects     Comprehensive (Level II) ultrasound: Detailed ultrasound performed between 18-22 weeks gestation to screen for major birth defects and markers for aneuploidy.    We reviewed the benefits and limitations of this testing.  Screening tests provide a risk assessment specific to the pregnancy for certain fetal chromosome abnormalities, but cannot definitively diagnose or exclude a fetal chromosome abnormality.  Follow-up genetic counseling and consideration of diagnostic testing is recommended with any abnormal screening result.     Diagnostic tests carry inherent risks- including risk of miscarriage- that require careful consideration.  These tests can detect fetal chromosome abnormalities with greater than 99% certainty.  Results can be compromised by maternal cell contamination or mosaicism, and are limited by the resolution of cytogenetic G-banding technology.  There is no screening nor diagnostic test that can detect all forms of birth defects or mental disability.     It was a pleasure to be involved with Amada s care.  Face-to-face time of the meeting was 30 minutes.      Ladi North MS, [facility]  Genetic Counselor  [facility]  Maternal Fetal Medicine  Pager: [phone]  Ph: [phone]

## 2022-12-02 NOTE — NURSING NOTE
Pt here for Chorionic Villi Sampling d/t previous child with T21. See genetic counseling dictation.  After consent signed and TimeOut completed, Dr. Zimmerman withdrew adequate villi x1 transabdominal pass.  Maternal cell contamination bloodwork drawn.  Pt is RH positive.  MD reviewed blood type and screen and Rhogam ordered. Rhogam NOT given today. Discharge teaching completed and questions answered.   Patient reports minimal pain.  Pt discharged ambulatory and stable.  Charge tech pager called to notify of specimen, specimen transported to [facility] and handed off to Maria Luisa.  Work-aid completed, signed and accompanied specimen.  Tiesha Mack RN

## 2022-12-06 ENCOUNTER — TELEPHONE (OUTPATIENT)
Dept: MATERNAL FETAL MEDICINE | Facility: CLINIC | Age: 32
End: 2022-12-06

## 2022-12-06 LAB
CHROM ANALY INTERPHASE CVS FISH-IMP: NORMAL
PATHOLOGY STUDY: NORMAL

## 2022-12-06 NOTE — TELEPHONE ENCOUNTER
December 6, 2022    Called and spoke with Rolanda regarding the FISH results that have returned from her recent CVS. Her partner, Matt, was also present for today's telephone call. Preliminary FISH via CVS returned NORMAL, female fetus. Two copies of chromosomes 12, 13, 18, and the sex chromosomes (XX) were identified. These preliminary diagnostic results are consistent with the patient's low risk NIPT results from the current pregnancy. Amada expressed relief in hearing this news today. She was made aware and confirmed that the final results will be communicated once chromosome analysis and microarray analysis with [facility] results become available.     PLAN: Chromosome analysis and microarray are currently pending. Results will be called out once available.       Ladi North MS, [facility]  Genetic Counselor  [facility]  Maternal Fetal Medicine  Ph: [phone]   Pager: [phone]

## 2022-12-12 ENCOUNTER — TELEPHONE (OUTPATIENT)
Dept: MATERNAL FETAL MEDICINE | Facility: CLINIC | Age: 32
End: 2022-12-12

## 2022-12-12 LAB
CHROM ANALY RESULT (ISCN): NORMAL
MATERNAL CELL CONTAM SPEC: NORMAL
SERVICE CMNT-IMP: YES

## 2022-12-12 NOTE — TELEPHONE ENCOUNTER
Called Amada with the next result from her CVS. Normal female FISH results have previously been discussed.     The SNP Microarray result is also NORMAL FEMALE.     RESULT SUMMARY   Normal Microarray Result (Female)   ------------------------------------------------------------   ----------   RESULT DESCRIPTION   No clinically significant copy number changes or regions of   homozygosity were detected.     INTERPRETATION   This analysis showed a normal result.     Cytogenomic Nomenclature (ISCN)   arr(X,1-22)x2     These results also were negative for maternal cell contamination.       Chromosome analysis is still pending. These results will be communicated with Amada when available.     These results will be faxed to the referring provider for review.     Anushka Lea MS, [facility]  Licensed Genetic Counselor   [facility]  Maternal Fetal Medicine  [email or web address]  [email or web address]  Office: [phone]  Pager [phone]  M: [phone]   Fax: [phone]

## 2022-12-13 LAB
KARYOTYP CVS: NORMAL
LABORATORY REPORT: NORMAL

## 2022-12-15 ENCOUNTER — TELEPHONE (OUTPATIENT)
Dept: MATERNAL FETAL MEDICINE | Facility: CLINIC | Age: 32
End: 2022-12-15

## 2022-12-15 NOTE — TELEPHONE ENCOUNTER
"December 15, 2022    I spoke to Rolanda to inform her that her CVS chromosome analysis yieled NORMAL results, consistent with a female  fetus with 46 structurally normal chromosomes (46, XX).      This analysis looks at large deletions/duplications and/or rearrangements of chromosomal material and nothing of clinical significance was identified in the patient's CVS sample. This analysis does not rule out the possibility of a microdeletion or microduplication that is too small for this test to be picked up on, however the patient did undergo chromosome microarray that yielded normal results. A copy of all of the patient's results can be found under the \"laboratory\" tab.    The patient was encouraged to contact me at my direct office telephone number should she have any questions or concerns regarding this last piece of diagnostic CVS testing. No other genetic testing results are pending for Rolanda.      Ladi North MS, [facility]  Genetic Counselor  [facility]  Maternal Fetal Medicine   [email or web address]  Ph: [phone]      "

## 2022-12-18 ENCOUNTER — APPOINTMENT (OUTPATIENT)
Dept: ULTRASOUND IMAGING | Facility: CLINIC | Age: 32
End: 2022-12-18
Attending: EMERGENCY MEDICINE
Payer: COMMERCIAL

## 2022-12-18 ENCOUNTER — ANESTHESIA (OUTPATIENT)
Dept: SURGERY | Facility: CLINIC | Age: 32
End: 2022-12-18
Payer: COMMERCIAL

## 2022-12-18 ENCOUNTER — HOSPITAL ENCOUNTER (OUTPATIENT)
Facility: CLINIC | Age: 32
Setting detail: OBSERVATION
Discharge: HOME | End: 2022-12-19
Attending: EMERGENCY MEDICINE | Admitting: OBSTETRICS & GYNECOLOGY
Payer: COMMERCIAL

## 2022-12-18 ENCOUNTER — ANESTHESIA EVENT (OUTPATIENT)
Dept: SURGERY | Facility: CLINIC | Age: 32
End: 2022-12-18
Payer: COMMERCIAL

## 2022-12-18 DIAGNOSIS — O03.9 MISCARRIAGE: ICD-10-CM

## 2022-12-18 DIAGNOSIS — O03.4 INCOMPLETE MISCARRIAGE: Primary | ICD-10-CM

## 2022-12-18 LAB
ABO/RH(D): NORMAL
ANTIBODY SCREEN: NEGATIVE
BASOPHILS # BLD AUTO: 0 10E3/UL (ref 0–0.2)
BASOPHILS NFR BLD AUTO: 1 %
EOSINOPHIL # BLD AUTO: 0.1 10E3/UL (ref 0–0.7)
EOSINOPHIL NFR BLD AUTO: 2 %
ERYTHROCYTE [DISTWIDTH] IN BLOOD BY AUTOMATED COUNT: 13 % (ref 10–15)
HCG SERPL QL: POSITIVE
HCG SERPL-ACNC: 4765 MLU/ML (ref 0–4)
HCT VFR BLD AUTO: 34.2 % (ref 35–47)
HGB BLD-MCNC: 11.5 G/DL (ref 11.7–15.7)
HGB BLD-MCNC: 9.5 G/DL (ref 11.7–15.7)
HOLD SPECIMEN: NORMAL
IMM GRANULOCYTES # BLD: 0.1 10E3/UL
IMM GRANULOCYTES NFR BLD: 1 %
LYMPHOCYTES # BLD AUTO: 1.9 10E3/UL (ref 0.8–5.3)
LYMPHOCYTES NFR BLD AUTO: 28 %
MCH RBC QN AUTO: 30.4 PG (ref 26.5–33)
MCHC RBC AUTO-ENTMCNC: 33.6 G/DL (ref 31.5–36.5)
MCV RBC AUTO: 91 FL (ref 78–100)
MONOCYTES # BLD AUTO: 0.4 10E3/UL (ref 0–1.3)
MONOCYTES NFR BLD AUTO: 6 %
NEUTROPHILS # BLD AUTO: 4.1 10E3/UL (ref 1.6–8.3)
NEUTROPHILS NFR BLD AUTO: 62 %
NRBC # BLD AUTO: 0 10E3/UL
NRBC BLD AUTO-RTO: 0 /100
PLATELET # BLD AUTO: 160 10E3/UL (ref 150–450)
RBC # BLD AUTO: 3.78 10E6/UL (ref 3.8–5.2)
SPECIMEN EXPIRATION DATE: NORMAL
WBC # BLD AUTO: 6.6 10E3/UL (ref 4–11)

## 2022-12-18 PROCEDURE — 86901 BLOOD TYPING SEROLOGIC RH(D): CPT | Performed by: OBSTETRICS & GYNECOLOGY

## 2022-12-18 PROCEDURE — 96375 TX/PRO/DX INJ NEW DRUG ADDON: CPT

## 2022-12-18 PROCEDURE — 250N000011 HC RX IP 250 OP 636: Performed by: EMERGENCY MEDICINE

## 2022-12-18 PROCEDURE — 88305 TISSUE EXAM BY PATHOLOGIST: CPT | Mod: 26 | Performed by: PATHOLOGY

## 2022-12-18 PROCEDURE — 85018 HEMOGLOBIN: CPT | Performed by: EMERGENCY MEDICINE

## 2022-12-18 PROCEDURE — 250N000011 HC RX IP 250 OP 636: Performed by: NURSE ANESTHETIST, CERTIFIED REGISTERED

## 2022-12-18 PROCEDURE — 84703 CHORIONIC GONADOTROPIN ASSAY: CPT | Performed by: EMERGENCY MEDICINE

## 2022-12-18 PROCEDURE — 370N000017 HC ANESTHESIA TECHNICAL FEE, PER MIN: Performed by: OBSTETRICS & GYNECOLOGY

## 2022-12-18 PROCEDURE — 36415 COLL VENOUS BLD VENIPUNCTURE: CPT | Performed by: OBSTETRICS & GYNECOLOGY

## 2022-12-18 PROCEDURE — 96376 TX/PRO/DX INJ SAME DRUG ADON: CPT

## 2022-12-18 PROCEDURE — 96372 THER/PROPH/DIAG INJ SC/IM: CPT | Performed by: NURSE ANESTHETIST, CERTIFIED REGISTERED

## 2022-12-18 PROCEDURE — 76805 OB US >/= 14 WKS SNGL FETUS: CPT

## 2022-12-18 PROCEDURE — 88305 TISSUE EXAM BY PATHOLOGIST: CPT | Mod: TC | Performed by: OBSTETRICS & GYNECOLOGY

## 2022-12-18 PROCEDURE — 272N000001 HC OR GENERAL SUPPLY STERILE: Performed by: OBSTETRICS & GYNECOLOGY

## 2022-12-18 PROCEDURE — 250N000011 HC RX IP 250 OP 636: Performed by: OBSTETRICS & GYNECOLOGY

## 2022-12-18 PROCEDURE — 99285 EMERGENCY DEPT VISIT HI MDM: CPT | Mod: 25

## 2022-12-18 PROCEDURE — 36415 COLL VENOUS BLD VENIPUNCTURE: CPT | Performed by: EMERGENCY MEDICINE

## 2022-12-18 PROCEDURE — 250N000009 HC RX 250: Performed by: OBSTETRICS & GYNECOLOGY

## 2022-12-18 PROCEDURE — 258N000003 HC RX IP 258 OP 636: Performed by: ANESTHESIOLOGY

## 2022-12-18 PROCEDURE — 250N000011 HC RX IP 250 OP 636: Performed by: ANESTHESIOLOGY

## 2022-12-18 PROCEDURE — 84702 CHORIONIC GONADOTROPIN TEST: CPT | Performed by: EMERGENCY MEDICINE

## 2022-12-18 PROCEDURE — 360N000075 HC SURGERY LEVEL 2, PER MIN: Performed by: OBSTETRICS & GYNECOLOGY

## 2022-12-18 PROCEDURE — G0378 HOSPITAL OBSERVATION PER HR: HCPCS

## 2022-12-18 PROCEDURE — 96374 THER/PROPH/DIAG INJ IV PUSH: CPT

## 2022-12-18 PROCEDURE — 271N000001 HC OR GENERAL SUPPLY NON-STERILE: Performed by: OBSTETRICS & GYNECOLOGY

## 2022-12-18 PROCEDURE — 85018 HEMOGLOBIN: CPT | Performed by: OBSTETRICS & GYNECOLOGY

## 2022-12-18 PROCEDURE — 86850 RBC ANTIBODY SCREEN: CPT | Performed by: OBSTETRICS & GYNECOLOGY

## 2022-12-18 PROCEDURE — 250N000013 HC RX MED GY IP 250 OP 250 PS 637: Performed by: OBSTETRICS & GYNECOLOGY

## 2022-12-18 RX ORDER — HYDROXYZINE HYDROCHLORIDE 25 MG/1
50 TABLET, FILM COATED ORAL EVERY 6 HOURS PRN
Status: DISCONTINUED | OUTPATIENT
Start: 2022-12-18 | End: 2022-12-19 | Stop reason: HOSPADM

## 2022-12-18 RX ORDER — ONDANSETRON 2 MG/ML
4 INJECTION INTRAMUSCULAR; INTRAVENOUS EVERY 6 HOURS PRN
Status: DISCONTINUED | OUTPATIENT
Start: 2022-12-18 | End: 2022-12-19 | Stop reason: HOSPADM

## 2022-12-18 RX ORDER — HYDROMORPHONE HCL IN WATER/PF 6 MG/30 ML
0.4 PATIENT CONTROLLED ANALGESIA SYRINGE INTRAVENOUS EVERY 5 MIN PRN
Status: DISCONTINUED | OUTPATIENT
Start: 2022-12-18 | End: 2022-12-18

## 2022-12-18 RX ORDER — HYDROMORPHONE HCL IN WATER/PF 6 MG/30 ML
0.2 PATIENT CONTROLLED ANALGESIA SYRINGE INTRAVENOUS EVERY 5 MIN PRN
Status: DISCONTINUED | OUTPATIENT
Start: 2022-12-18 | End: 2022-12-18

## 2022-12-18 RX ORDER — OXYCODONE HYDROCHLORIDE 5 MG/1
10 TABLET ORAL EVERY 4 HOURS PRN
Status: DISCONTINUED | OUTPATIENT
Start: 2022-12-18 | End: 2022-12-19 | Stop reason: HOSPADM

## 2022-12-18 RX ORDER — HYDRALAZINE HYDROCHLORIDE 20 MG/ML
2.5-5 INJECTION INTRAMUSCULAR; INTRAVENOUS EVERY 10 MIN PRN
Status: DISCONTINUED | OUTPATIENT
Start: 2022-12-18 | End: 2022-12-18

## 2022-12-18 RX ORDER — AZITHROMYCIN 500 MG/5ML
500 INJECTION, POWDER, LYOPHILIZED, FOR SOLUTION INTRAVENOUS
Status: COMPLETED | OUTPATIENT
Start: 2022-12-18 | End: 2022-12-18

## 2022-12-18 RX ORDER — HYDROMORPHONE HCL IN WATER/PF 6 MG/30 ML
0.4 PATIENT CONTROLLED ANALGESIA SYRINGE INTRAVENOUS
Status: DISCONTINUED | OUTPATIENT
Start: 2022-12-18 | End: 2022-12-19 | Stop reason: HOSPADM

## 2022-12-18 RX ORDER — NALOXONE HYDROCHLORIDE 0.4 MG/ML
0.2 INJECTION, SOLUTION INTRAMUSCULAR; INTRAVENOUS; SUBCUTANEOUS
Status: DISCONTINUED | OUTPATIENT
Start: 2022-12-18 | End: 2022-12-19 | Stop reason: HOSPADM

## 2022-12-18 RX ORDER — MISOPROSTOL 200 UG/1
TABLET ORAL
Status: DISCONTINUED
Start: 2022-12-18 | End: 2022-12-18 | Stop reason: HOSPADM

## 2022-12-18 RX ORDER — PROCHLORPERAZINE MALEATE 10 MG
10 TABLET ORAL EVERY 6 HOURS PRN
Status: DISCONTINUED | OUTPATIENT
Start: 2022-12-18 | End: 2022-12-19 | Stop reason: HOSPADM

## 2022-12-18 RX ORDER — HYDROMORPHONE HYDROCHLORIDE 1 MG/ML
0.5 INJECTION, SOLUTION INTRAMUSCULAR; INTRAVENOUS; SUBCUTANEOUS
Status: COMPLETED | OUTPATIENT
Start: 2022-12-18 | End: 2022-12-18

## 2022-12-18 RX ORDER — SODIUM CHLORIDE, SODIUM LACTATE, POTASSIUM CHLORIDE, CALCIUM CHLORIDE 600; 310; 30; 20 MG/100ML; MG/100ML; MG/100ML; MG/100ML
INJECTION, SOLUTION INTRAVENOUS CONTINUOUS
Status: DISCONTINUED | OUTPATIENT
Start: 2022-12-18 | End: 2022-12-19 | Stop reason: HOSPADM

## 2022-12-18 RX ORDER — ALPRAZOLAM 1 MG
1 TABLET ORAL 3 TIMES DAILY PRN
Status: ON HOLD | COMMUNITY
End: 2022-12-19

## 2022-12-18 RX ORDER — HYDROMORPHONE HCL IN WATER/PF 6 MG/30 ML
0.2 PATIENT CONTROLLED ANALGESIA SYRINGE INTRAVENOUS
Status: DISCONTINUED | OUTPATIENT
Start: 2022-12-18 | End: 2022-12-19 | Stop reason: HOSPADM

## 2022-12-18 RX ORDER — ACETAMINOPHEN 325 MG/1
975 TABLET ORAL EVERY 6 HOURS
Status: DISCONTINUED | OUTPATIENT
Start: 2022-12-18 | End: 2022-12-19 | Stop reason: HOSPADM

## 2022-12-18 RX ORDER — AMOXICILLIN 250 MG
1 CAPSULE ORAL 2 TIMES DAILY
Status: DISCONTINUED | OUTPATIENT
Start: 2022-12-18 | End: 2022-12-19 | Stop reason: HOSPADM

## 2022-12-18 RX ORDER — FENTANYL CITRATE 50 UG/ML
50 INJECTION, SOLUTION INTRAMUSCULAR; INTRAVENOUS EVERY 5 MIN PRN
Status: DISCONTINUED | OUTPATIENT
Start: 2022-12-18 | End: 2022-12-19 | Stop reason: HOSPADM

## 2022-12-18 RX ORDER — FENTANYL CITRATE 50 UG/ML
75 INJECTION, SOLUTION INTRAMUSCULAR; INTRAVENOUS ONCE
Status: COMPLETED | OUTPATIENT
Start: 2022-12-18 | End: 2022-12-18

## 2022-12-18 RX ORDER — ALBUTEROL SULFATE 90 UG/1
2 AEROSOL, METERED RESPIRATORY (INHALATION) EVERY 4 HOURS PRN
Status: DISCONTINUED | OUTPATIENT
Start: 2022-12-18 | End: 2022-12-19 | Stop reason: HOSPADM

## 2022-12-18 RX ORDER — BISACODYL 10 MG
10 SUPPOSITORY, RECTAL RECTAL DAILY PRN
Status: DISCONTINUED | OUTPATIENT
Start: 2022-12-18 | End: 2022-12-19 | Stop reason: HOSPADM

## 2022-12-18 RX ORDER — ALPRAZOLAM 0.5 MG
1 TABLET ORAL 3 TIMES DAILY PRN
Status: DISCONTINUED | OUTPATIENT
Start: 2022-12-18 | End: 2022-12-19

## 2022-12-18 RX ORDER — METHYLERGONOVINE MALEATE 0.2 MG/ML
INJECTION INTRAVENOUS PRN
Status: DISCONTINUED | OUTPATIENT
Start: 2022-12-18 | End: 2022-12-18

## 2022-12-18 RX ORDER — PAROXETINE 40 MG/1
40 TABLET, FILM COATED ORAL EVERY MORNING
COMMUNITY
End: 2023-06-13

## 2022-12-18 RX ORDER — MISOPROSTOL 200 UG/1
TABLET ORAL PRN
Status: DISCONTINUED | OUTPATIENT
Start: 2022-12-18 | End: 2022-12-18 | Stop reason: HOSPADM

## 2022-12-18 RX ORDER — LIDOCAINE 40 MG/G
CREAM TOPICAL
Status: DISCONTINUED | OUTPATIENT
Start: 2022-12-18 | End: 2022-12-18 | Stop reason: HOSPADM

## 2022-12-18 RX ORDER — MISOPROSTOL 200 UG/1
800 TABLET ORAL ONCE
Status: COMPLETED | OUTPATIENT
Start: 2022-12-18 | End: 2022-12-18

## 2022-12-18 RX ORDER — FENTANYL CITRATE 50 UG/ML
25 INJECTION, SOLUTION INTRAMUSCULAR; INTRAVENOUS EVERY 5 MIN PRN
Status: DISCONTINUED | OUTPATIENT
Start: 2022-12-18 | End: 2022-12-19 | Stop reason: HOSPADM

## 2022-12-18 RX ORDER — FENTANYL CITRATE 50 UG/ML
25 INJECTION, SOLUTION INTRAMUSCULAR; INTRAVENOUS
Status: DISCONTINUED | OUTPATIENT
Start: 2022-12-18 | End: 2022-12-19 | Stop reason: HOSPADM

## 2022-12-18 RX ORDER — NALOXONE HYDROCHLORIDE 0.4 MG/ML
0.4 INJECTION, SOLUTION INTRAMUSCULAR; INTRAVENOUS; SUBCUTANEOUS
Status: DISCONTINUED | OUTPATIENT
Start: 2022-12-18 | End: 2022-12-19 | Stop reason: HOSPADM

## 2022-12-18 RX ORDER — ACETAMINOPHEN 325 MG/1
650 TABLET ORAL EVERY 6 HOURS
Status: DISCONTINUED | OUTPATIENT
Start: 2022-12-22 | End: 2022-12-19 | Stop reason: HOSPADM

## 2022-12-18 RX ORDER — FENTANYL CITRATE 50 UG/ML
INJECTION, SOLUTION INTRAMUSCULAR; INTRAVENOUS PRN
Status: DISCONTINUED | OUTPATIENT
Start: 2022-12-18 | End: 2022-12-18

## 2022-12-18 RX ORDER — SODIUM CHLORIDE, SODIUM LACTATE, POTASSIUM CHLORIDE, CALCIUM CHLORIDE 600; 310; 30; 20 MG/100ML; MG/100ML; MG/100ML; MG/100ML
INJECTION, SOLUTION INTRAVENOUS CONTINUOUS
Status: DISCONTINUED | OUTPATIENT
Start: 2022-12-18 | End: 2022-12-18 | Stop reason: HOSPADM

## 2022-12-18 RX ORDER — ONDANSETRON 4 MG/1
4 TABLET, ORALLY DISINTEGRATING ORAL EVERY 6 HOURS PRN
Status: DISCONTINUED | OUTPATIENT
Start: 2022-12-18 | End: 2022-12-19 | Stop reason: HOSPADM

## 2022-12-18 RX ORDER — MEPERIDINE HYDROCHLORIDE 50 MG/ML
12.5 INJECTION INTRAMUSCULAR; INTRAVENOUS; SUBCUTANEOUS
Status: DISCONTINUED | OUTPATIENT
Start: 2022-12-18 | End: 2022-12-18

## 2022-12-18 RX ORDER — HALOPERIDOL 5 MG/ML
1 INJECTION INTRAMUSCULAR
Status: DISCONTINUED | OUTPATIENT
Start: 2022-12-18 | End: 2022-12-18

## 2022-12-18 RX ORDER — ALBUTEROL SULFATE 0.83 MG/ML
2.5 SOLUTION RESPIRATORY (INHALATION) EVERY 4 HOURS PRN
Status: DISCONTINUED | OUTPATIENT
Start: 2022-12-18 | End: 2022-12-19 | Stop reason: HOSPADM

## 2022-12-18 RX ORDER — OXYCODONE HYDROCHLORIDE 5 MG/1
5 TABLET ORAL EVERY 4 HOURS PRN
Status: DISCONTINUED | OUTPATIENT
Start: 2022-12-18 | End: 2022-12-19 | Stop reason: HOSPADM

## 2022-12-18 RX ORDER — PRENATAL VIT/IRON FUM/FOLIC AC 27MG-0.8MG
1 TABLET ORAL DAILY
Status: ON HOLD | COMMUNITY
End: 2023-11-16

## 2022-12-18 RX ORDER — KETOROLAC TROMETHAMINE 30 MG/ML
15 INJECTION, SOLUTION INTRAMUSCULAR; INTRAVENOUS EVERY 6 HOURS
Status: DISCONTINUED | OUTPATIENT
Start: 2022-12-18 | End: 2022-12-19 | Stop reason: HOSPADM

## 2022-12-18 RX ORDER — ONDANSETRON 2 MG/ML
4 INJECTION INTRAMUSCULAR; INTRAVENOUS EVERY 30 MIN PRN
Status: DISCONTINUED | OUTPATIENT
Start: 2022-12-18 | End: 2022-12-18

## 2022-12-18 RX ORDER — ONDANSETRON 2 MG/ML
INJECTION INTRAMUSCULAR; INTRAVENOUS PRN
Status: DISCONTINUED | OUTPATIENT
Start: 2022-12-18 | End: 2022-12-18

## 2022-12-18 RX ORDER — PAROXETINE 20 MG/1
40 TABLET, FILM COATED ORAL EVERY MORNING
Status: DISCONTINUED | OUTPATIENT
Start: 2022-12-19 | End: 2022-12-19 | Stop reason: HOSPADM

## 2022-12-18 RX ORDER — METHYLERGONOVINE MALEATE 0.2 MG/ML
INJECTION INTRAVENOUS
Status: COMPLETED
Start: 2022-12-18 | End: 2022-12-18

## 2022-12-18 RX ORDER — IBUPROFEN 800 MG/1
800 TABLET, FILM COATED ORAL EVERY 6 HOURS
Status: DISCONTINUED | OUTPATIENT
Start: 2022-12-18 | End: 2022-12-19 | Stop reason: HOSPADM

## 2022-12-18 RX ORDER — LIDOCAINE 40 MG/G
CREAM TOPICAL
Status: DISCONTINUED | OUTPATIENT
Start: 2022-12-18 | End: 2022-12-19 | Stop reason: HOSPADM

## 2022-12-18 RX ORDER — ONDANSETRON 4 MG/1
4 TABLET, ORALLY DISINTEGRATING ORAL EVERY 30 MIN PRN
Status: DISCONTINUED | OUTPATIENT
Start: 2022-12-18 | End: 2022-12-18

## 2022-12-18 RX ADMIN — MIDAZOLAM 2 MG: 1 INJECTION INTRAMUSCULAR; INTRAVENOUS at 18:23

## 2022-12-18 RX ADMIN — OXYCODONE HYDROCHLORIDE 5 MG: 5 TABLET ORAL at 21:54

## 2022-12-18 RX ADMIN — OXYCODONE HYDROCHLORIDE 5 MG: 5 TABLET ORAL at 22:27

## 2022-12-18 RX ADMIN — HYDROMORPHONE HYDROCHLORIDE 0.2 MG: 0.2 INJECTION, SOLUTION INTRAMUSCULAR; INTRAVENOUS; SUBCUTANEOUS at 20:19

## 2022-12-18 RX ADMIN — TRANEXAMIC ACID 1 G: 1 INJECTION, SOLUTION INTRAVENOUS at 17:53

## 2022-12-18 RX ADMIN — IBUPROFEN 800 MG: 800 TABLET ORAL at 20:53

## 2022-12-18 RX ADMIN — HYDROMORPHONE HYDROCHLORIDE 0.5 MG: 1 INJECTION, SOLUTION INTRAMUSCULAR; INTRAVENOUS; SUBCUTANEOUS at 16:08

## 2022-12-18 RX ADMIN — SODIUM CHLORIDE, POTASSIUM CHLORIDE, SODIUM LACTATE AND CALCIUM CHLORIDE: 600; 310; 30; 20 INJECTION, SOLUTION INTRAVENOUS at 18:19

## 2022-12-18 RX ADMIN — SODIUM CHLORIDE, POTASSIUM CHLORIDE, SODIUM LACTATE AND CALCIUM CHLORIDE: 600; 310; 30; 20 INJECTION, SOLUTION INTRAVENOUS at 18:42

## 2022-12-18 RX ADMIN — METHYLERGONOVINE MALEATE 200 MCG: 0.2 INJECTION INTRAVENOUS at 18:54

## 2022-12-18 RX ADMIN — HYDROMORPHONE HYDROCHLORIDE 0.5 MG: 1 INJECTION, SOLUTION INTRAMUSCULAR; INTRAVENOUS; SUBCUTANEOUS at 15:13

## 2022-12-18 RX ADMIN — HYDROMORPHONE HYDROCHLORIDE 0.2 MG: 0.2 INJECTION, SOLUTION INTRAMUSCULAR; INTRAVENOUS; SUBCUTANEOUS at 21:05

## 2022-12-18 RX ADMIN — FENTANYL CITRATE 75 MCG: 50 INJECTION, SOLUTION INTRAMUSCULAR; INTRAVENOUS at 16:47

## 2022-12-18 RX ADMIN — ACETAMINOPHEN 975 MG: 325 TABLET ORAL at 20:53

## 2022-12-18 RX ADMIN — HYDROMORPHONE HYDROCHLORIDE 0.5 MG: 1 INJECTION, SOLUTION INTRAMUSCULAR; INTRAVENOUS; SUBCUTANEOUS at 14:31

## 2022-12-18 RX ADMIN — MISOPROSTOL 800 MCG: 200 TABLET ORAL at 17:14

## 2022-12-18 RX ADMIN — AZITHROMYCIN MONOHYDRATE 500 MG: 500 INJECTION, POWDER, LYOPHILIZED, FOR SOLUTION INTRAVENOUS at 18:33

## 2022-12-18 RX ADMIN — FENTANYL CITRATE 50 MCG: 50 INJECTION, SOLUTION INTRAMUSCULAR; INTRAVENOUS at 18:44

## 2022-12-18 RX ADMIN — FENTANYL CITRATE 50 MCG: 50 INJECTION, SOLUTION INTRAMUSCULAR; INTRAVENOUS at 18:26

## 2022-12-18 RX ADMIN — FENTANYL CITRATE 50 MCG: 50 INJECTION, SOLUTION INTRAMUSCULAR; INTRAVENOUS at 18:23

## 2022-12-18 RX ADMIN — ONDANSETRON 4 MG: 2 INJECTION INTRAMUSCULAR; INTRAVENOUS at 18:43

## 2022-12-18 ASSESSMENT — ACTIVITIES OF DAILY LIVING (ADL)
TOILETING_ISSUES: NO
DOING_ERRANDS_INDEPENDENTLY_DIFFICULTY: NO
ADLS_ACUITY_SCORE: 35
CONCENTRATING,_REMEMBERING_OR_MAKING_DECISIONS_DIFFICULTY: NO
ADLS_ACUITY_SCORE: 35
DIFFICULTY_EATING/SWALLOWING: NO
ADLS_ACUITY_SCORE: 35
ADLS_ACUITY_SCORE: 35
VISION_MANAGEMENT: GLASSES
DRESSING/BATHING_DIFFICULTY: NO
CHANGE_IN_FUNCTIONAL_STATUS_SINCE_ONSET_OF_CURRENT_ILLNESS/INJURY: NO
WALKING_OR_CLIMBING_STAIRS_DIFFICULTY: NO
FALL_HISTORY_WITHIN_LAST_SIX_MONTHS: NO
WEAR_GLASSES_OR_BLIND: YES
ADLS_ACUITY_SCORE: 35

## 2022-12-18 ASSESSMENT — LIFESTYLE VARIABLES: TOBACCO_USE: 1

## 2022-12-18 NOTE — ED PROVIDER NOTES
EMERGENCY DEPARTMENT ENCOUNTER            IMPRESSION:  Incomplete miscarriage      MEDICAL DECISION MAKING:  Patient evaluated for second trimester vaginal bleeding.  She is approximately 15 weeks pregnant.  She had a recent clinic visit that demonstrated fetal demise.  She reports pelvic pain and bleeding.  She could not identify any tissue. she is O+    On exam her vital signs are normal.  She is slightly tachycardic.  She appears uncomfortable.  There is no abdominal tenderness.  Pelvic exam showed open os.  There was minimal bleeding.    Patient received pain medication    hCG ordered    I spoke with on-call OB who recommended pelvic ultrasound          =================================================================  CHIEF COMPLAINT:  Chief Complaint   Patient presents with     Vaginal Bleeding - Pregnant     Abdominal Pain         HPI  Amada Melo is a 32 year old female with no pertinent medical history who presents to the ED escorted by her partner for evaluation of vaginal bleeding with pregnant and abdominal pain.    Per chart review, on 12/16/2022 the patient had an ultrasound where fetal heart sounds were absent. The patient was seen by Dr. Nghia Hairston at [facility].    The patient is 15 weeks pregnant with her fourth pregnancy having had two live children. She was shoveling Thursday and afterwards had spotting. The next day she had an ultrasound where they also saw spotting and found no fetal heartbeat. She was going to return on Monday, but today she develop cramping with more vaginal blood clots.     Patient's main OBGYN doctor is Dr. Gretchen Orozco.    I, Alex Andujar am serving as a scribe to document services personally performed by Dr. Stephan Leyva MD, based on my observation and the provider's statements to me. I, Dr. Stephan Leyva MD attest that Alex Andujar is acting in a scribe capacity, has observed my performance of the services and has documented them in accordance with my  direction.      REVIEW OF SYSTEMS   Constitutional: Denies fever, chills, unintentional weight loss or fatigue   Eyes: Denies visual changes or discharge    HENT: Denies sore throat, ear pain or neck pain  Respiratory: Denies cough or shortness of breath    Cardiovascular: Denies chest pain, palpitations or leg swelling  GI: Denies nausea, vomiting, or dark, bloody stools.  Positive for abdominal cramping.  : Denies hematuria, dysuria, or flank pain. Positive for vaginal bleeding.  Musculoskeletal: Denies any new back pain or new muscle/joint pains  Skin: Denies rash or wound  Neurologic: Denies current headache, new weakness, focal weakness, or sensory changes    Lymphatic: Denies swollen glands    Psychiatric: Denies depression, suicidal ideation or homicidal ideation.    Remainder of systems reviewed, unless noted in HPI all others negative.      PAST MEDICAL HISTORY:  Past Medical History:   Diagnosis Date     ADD (attention deficit disorder)      Genital herpes      PTSD (post-traumatic stress disorder)      Uncomplicated asthma        PAST SURGICAL HISTORY:  Past Surgical History:   Procedure Laterality Date     DILATION AND CURETTAGE SUCTION N/A 12/18/2022    Procedure: DILATION AND CURETTAGE, UTERUS, USING SUCTION;  Surgeon: Alicia Mota MD;  Location: [facility] OR     ENT SURGERY       PELVIC EXAMINATION UNDER ANESTHESIA N/A 12/18/2022    Procedure: EXAM UNDER ANESTHESIA, GYNECOLOGIC;  Surgeon: Alicia Mota MD;  Location: [facility] OR     TONSILLECTOMY       WISDOM TOOTH EXTRACTION           CURRENT MEDICATIONS:    albuterol (PROAIR HFA, PROVENTIL HFA, VENTOLIN HFA) 108 (90 BASE) MCG/ACT inhaler  ALPRAZolam (XANAX) 1 MG tablet  PARoxetine (PAXIL) 40 MG tablet  Prenatal Vit-Fe Fumarate-FA (PRENATAL MULTIVITAMIN W/IRON) 27-0.8 MG tablet  doxycycline hyclate (VIBRAMYCIN) 100 MG capsule  oxyCODONE (ROXICODONE) 5 MG tablet        ALLERGIES:  Allergies   Allergen Reactions  "    Nickel      Skin irritation      Penicillins Hives     Steri Strips      blister       FAMILY HISTORY:  Family History   Problem Relation Age of Onset     Depression Mother      Depression Father      Asthma Mother      Cervical Cancer Mother      Cerebrovascular Disease Maternal Grandmother      Hypercalcemia Paternal Grandmother      Hypertension Paternal Grandmother        SOCIAL HISTORY:   Social History     Socioeconomic History     Marital status: Legally      Spouse name: seperated for 4 years     Number of children: 1   Occupational History     Occupation: unemployed     Employer: [employer]   Tobacco Use     Smoking status: Former     Packs/day: 0.50     Types: Cigarettes     Quit date: 2018     Years since quittin.2     Smokeless tobacco: Never     Tobacco comments:     5 cigs per day---also does e-cig   Substance and Sexual Activity     Alcohol use: Yes     Alcohol/week: 0.0 standard drinks     Comment: Alcoholic Drinks/day: \"sometimes\" - not in pregnancy     Drug use: No     Sexual activity: Not Currently       PHYSICAL EXAM:    /66 (BP Location: Right arm, Patient Position: Sitting, Cuff Size: Adult Regular)   Pulse 93   Temp 98.1  F (36.7  C) (Oral)   Resp 18   Ht 1.803 m (5' 11\")   Wt 113.4 kg (250 lb)   LMP  (LMP Unknown)   SpO2 98%   BMI 34.87 kg/m      Constitutional: Awake, alert, in no acute distress  Respiratory: Respirations even, unlabored. Lungs clear to ascultation bilaterally, in no acute respiratory distress.  Cardiovascular: Regular rate and rhythm.+2 radial pulses, equal bilaterally.  No murmurs.   GI: Abdomen soft, non-tender to palpation in all 4 quadrants. No guarding or rebound. Bowel sounds intact on all 4 quadrants.  Pelvic exam performed with nurse chaperone.  Cervical os is open.  There is no tissue and minimal bleeding  Back: No CVA tenderness.    Psychiatric: Normal mood and affect. Normal judgement.    ED COURSE:    2:10 PM I initially met " with the patient and conducted the physical exam.  2:41 PM I examined the patient's bleeding.  2:59 PM I talked with Dr. Song of OBGYN.    LAB:  All pertinent labs reviewed and interpreted.  Results for orders placed or performed during the hospital encounter of 12/18/22   US OB > 14 Weeks Complete w Transvaginal    Impression    IMPRESSION:    1.  Fetal pole identified within the endometrial canal. This is in the lower uterine segment and appears to be entering the cervix. The cervix is dilated. No fetal heart rate is identified.    Extra Blue Top Tube   Result Value Ref Range    Hold Specimen JIC    Extra Red Top Tube   Result Value Ref Range    Hold Specimen JIC    Extra Green Top (Lithium Heparin) Tube   Result Value Ref Range    Hold Specimen JIC    Extra Purple Top Tube   Result Value Ref Range    Hold Specimen JIC    Extra Blood Bank Purple Top Tube   Result Value Ref Range    Hold Specimen JIC    HCG qualitative Blood   Result Value Ref Range    hCG Serum Qualitative Positive (A) Negative   CBC with platelets and differential   Result Value Ref Range    WBC Count 6.6 4.0 - 11.0 10e3/uL    RBC Count 3.78 (L) 3.80 - 5.20 10e6/uL    Hemoglobin 11.5 (L) 11.7 - 15.7 g/dL    Hematocrit 34.2 (L) 35.0 - 47.0 %    MCV 91 78 - 100 fL    MCH 30.4 26.5 - 33.0 pg    MCHC 33.6 31.5 - 36.5 g/dL    RDW 13.0 10.0 - 15.0 %    Platelet Count 160 150 - 450 10e3/uL    % Neutrophils 62 %    % Lymphocytes 28 %    % Monocytes 6 %    % Eosinophils 2 %    % Basophils 1 %    % Immature Granulocytes 1 %    NRBCs per 100 WBC 0 <1 /100    Absolute Neutrophils 4.1 1.6 - 8.3 10e3/uL    Absolute Lymphocytes 1.9 0.8 - 5.3 10e3/uL    Absolute Monocytes 0.4 0.0 - 1.3 10e3/uL    Absolute Eosinophils 0.1 0.0 - 0.7 10e3/uL    Absolute Basophils 0.0 0.0 - 0.2 10e3/uL    Absolute Immature Granulocytes 0.1 <=0.4 10e3/uL    Absolute NRBCs 0.0 10e3/uL   HCG quantitative pregnancy   Result Value Ref Range    hCG Quantitative 4,765 (H) 0 - 4 mlU/mL    Result Value Ref Range    Hemoglobin 9.5 (L) 11.7 - 15.7 g/dL   Result Value Ref Range    Hemoglobin 9.3 (L) 11.7 - 15.7 g/dL   Result Value Ref Range    Hemoglobin A1C <4.2 <5.7 %   Lupus Anticoagulant Panel   Result Value Ref Range    Thrombin Time 15.9 13.0 - 19.0 Seconds    PTT Ratio 1.15 <1.21    DRVVT Screen Ratio 1.07 <1.08    Lupus Result Negative Negative    Lupus Interpretation       Results    The INR is normal.  APTT ratio is normal.  DRVVT Screen ratio is normal.  Thrombin time is normal.    Final Interpretation    NEGATIVE TEST; A LUPUS ANTICOAGULANT WAS NOT DETECTED IN THIS SPECIMEN WITHIN THE LIMITS OF THE TESTING REPERTOIRE.    Recommendations    If the clinical picture is strongly suggestive of an antiphospholipid syndrome, recommend anticardiolipin and beta-2-glycoprotein (IgG and IgM) antibody tests.     Kenia Sharma MD, PhD  [facility]     TSH with free T4 reflex   Result Value Ref Range    TSH 1.46 0.30 - 5.00 uIU/mL   Partial thromboplastin time   Result Value Ref Range    aPTT 29 22 - 38 Seconds   Result Value Ref Range    INR 0.96 0.85 - 1.15   Adult Type and Screen   Result Value Ref Range    ABO/RH(D) O POS     Antibody Screen Negative Negative    SPECIMEN EXPIRATION DATE [id number]    Fetal hemoglobin stain Kleihauer   Result Value Ref Range    Fetal RBC % 0.0 <0.1 %    Fetal RBCs mL 0 mL    If Indicated, Recommended Dose of RH Immune Globulin (ug) 300   ug   Surgical Pathology Exam   Result Value Ref Range    Case Report       Surgical Pathology Report                         Case: [id number]                                  Authorizing Provider:  Alicia Mota,  Collected:           12/18/2022 06:43 PM                                 MD                                                                           Ordering Location:     [facility]          Received:            12/19/2022 08:18 AM                                 [facility] OR      "                                              Pathologist:           Roddy Alamo MD                                                        Specimen:    Products of Conception, products of conception                                             Final Diagnosis       SPECIMEN SUBMITTED AS PRODUCTS OF CONCEPTION:  -BENIGN APPEARING CHORIONIC VILLI; DECIDUALIZED ENDOMETRIAL TISSUE WITH CHRONIC INFLAMMATION AND HEMORRHAGE; FINDINGS CONSISTENT WITH PRODUCTS OF CONCEPTION  -NO TUMOR SEEN      Clinical Information       Procedure:    DILATION AND CURETTAGE, UTERUS, USING SUCTION  EXAM UNDER ANESTHESIA, GYNECOLOGIC  Pre-op Diagnosis:   Miscarriage [O03.9]  Incomplete miscarriage [O03.4]  Post-op Diagnosis:   O03.9 - Miscarriage [ICD-10-CM]  O03.4 - Incomplete miscarriage [ICD-10-CM]      Gross Description       A(1). Products of Conception, products of conception:  The specimen is received fresh and placed in formalin 12/18/2022 at 1920 labeled with patient's name and \" products of conception.\"  It consist of tan and hemorrhagic tissue fragments measuring 4.5 x 4 x 2.5 cm.  Fetal parts and hydropic villi are not seen.  RS-2C Kentrell Sinha MD: amp-d      Microscopic Description       Microscopic examination performed, substantiating the above diagnosis.        Performing Labs       The technical component of this testing was completed at [facility]      Case Images         RADIOLOGY:  Reviewed all pertinent imaging. Please see official radiology report.  US OB > 14 Weeks Complete w Transvaginal   Final Result   IMPRESSION:     1.  Fetal pole identified within the endometrial canal. This is in the lower uterine segment and appears to be entering the cervix. The cervix is dilated. No fetal heart rate is identified.              MEDICATIONS GIVEN IN THE EMERGENCY:  Medications   HYDROmorphone (PF) (DILAUDID) injection 0.5 mg (0.5 mg Intravenous Given " 12/18/22 1513)   HYDROmorphone (PF) (DILAUDID) injection 0.5 mg (0.5 mg Intravenous Given 12/18/22 1608)   fentaNYL (PF) (SUBLIMAZE) injection 75 mcg (75 mcg Intravenous Given 12/18/22 1647)   misoprostol (CYTOTEC) tablet 800 mcg (800 mcg Buccal Given 12/18/22 1714)   tranexamic acid (CYKLOKAPRON) bolus 1 g vial attach to NaCl 50 or 100 mL bag ADULT (1 g Intravenous Given 12/18/22 1753)   azithromycin 500 mg (ZITHROMAX) in 0.9% NaCl 250 mL intermittent infusion 500 mg ( Intravenous Anesthesia Volume Adjustment 12/18/22 1911)   methylergonovine (METHERGINE) 0.2 MG/ML injection (  Override pull for Anesthesia 12/18/22 1856)   diphenoxylate-atropine (LOMOTIL) 2.5-0.025 MG per tablet 2 tablet (2 tablets Oral Not Given 12/19/22 1519)           NEW PRESCRIPTIONS STARTED AT TODAY'S ER VISIT:  Discharge Medication List as of 12/19/2022  5:42 PM      START taking these medications    Details   oxyCODONE (ROXICODONE) 10 MG tablet Take 1 tablet (10 mg) by mouth every 4 hours as needed for severe pain (7-10), Disp-8 tablet, R-0, Local Print                FINAL DIAGNOSIS:    ICD-10-CM    1. Incomplete miscarriage  O03.4 Case Request: DILATION AND CURETTAGE, UTERUS, USING SUCTION     Case Request: DILATION AND CURETTAGE, UTERUS, USING SUCTION      2. Miscarriage  O03.9 Case Request: DILATION AND CURETTAGE, UTERUS, USING SUCTION     Case Request: DILATION AND CURETTAGE, UTERUS, USING SUCTION     ALPRAZolam (XANAX) 1 MG tablet     DISCONTINUED: oxyCODONE (ROXICODONE) 10 MG tablet               At the conclusion of the encounter I discussed the results of all of the tests and the disposition. The questions were answered. The patient or family acknowledged understanding and was agreeable with the care plan.     NAME: Amada Melo  AGE: 32 year old female  YOB: 1990  MRN: [id number]  EVALUATION DATE & TIME: 12/18/2022  2:06 PM    PCP: No Ref-Primary, Physician    ED PROVIDER: RONALDO Peters Adam  Con, am serving as a scribe to document services personally performed by Dr. Stephan Leyva based on my observation and the provider's statements to me. I, Stephan Leyva MD attest that Alex Andujar is acting in a scribe capacity, has observed my performance of the services and has documented them in accordance with my direction.    Stephan Leyva M.D.  Emergency Medicine  [facility] EMERGENCY ROOM  [address] [address]  [phone]  Dept: [phone]  12/18/2022       Stephan Leyva MD  12/27/22 1358       Stephan Leyva MD  12/27/22 1354

## 2022-12-18 NOTE — H&P
[facility]  History and Physical    Amada Dye MRN# [id number]   Age: 32 year old YOB: 1990     Date of Admission:  2022    Primary care provider: No Ref-Primary, Physician           Chief Complaint:   Amada Dye is a 32 year old female who is 14w4d pregnant and being admitted for miscarriage.  Patient had USN at 11 weeks demonstrating a viable fetus, had CVS testing demonstrating normal female anatomy but on  presented to clinic with spotting and was found to have a fetal demise.  Today she presents to the ER with small amounts of bleeding but intense cramps,similar to labor.  She has has a prior second trimester pregnancy loss for which she had a D and C.  She has had two liveborn birtsh with epidural.          Pregnancy history:     OBSTETRIC HISTORY:    OB History    Para Term  AB Living   4 2 2 0 1 1   SAB IAB Ectopic Multiple Live Births   1 0 0 0 1      # Outcome Date GA Lbr Nhan/2nd Weight Sex Delivery Anes PTL Lv   4 Current            3 Term 19 38w6d 29:25 / 00:13 3.46 kg (7 lb 10.1 oz) M  EPI N PHOEBE      Name: CATALINA DYE-AMADA      Apgar1: 8  Apgar5: 9   2 Term            1 SAB                EDC: Estimated Date of Delivery: 23    Prenatal Labs:   Lab Results   Component Value Date    AS Negative 11/15/2022    HEPBANG Nonreactive 11/15/2022    CHPCRT  10/26/2015     Negative   Negative for C. trachomatis rRNA by transcription mediated amplification.   A negative result by transcription mediated amplification does not preclude the   presence of C. trachomatis infection because results are dependent on proper   and adequate collection, absence of inhibitors, and sufficient rRNA to be   detected.      GCPCRT  10/26/2015     Negative   Negative for N. gonorrhoeae rRNA by transcription mediated amplification.   A negative result by transcription mediated amplification does not preclude the   presence of N.  gonorrhoeae infection because results are dependent on proper   and adequate collection, absence of inhibitors, and sufficient rRNA to be   detected.      HGB 11.5 (L) 12/18/2022       GBS Status:   No results found for: GBS    Active Problem List  Patient Active Problem List   Diagnosis     Low back pain with right-sided sciatica     PTSD (post-traumatic stress disorder)     Attention deficit hyperactivity disorder (ADHD), unspecified ADHD type     Scoliosis       Medication Prior to Admission  (Not in a hospital admission)  .        Maternal Past Medical History:     Past Medical History:   Diagnosis Date     ADD (attention deficit disorder)      Genital herpes      PTSD (post-traumatic stress disorder)      Uncomplicated asthma                   Physical Exam:   Vitals were reviewed  Alert, O x 3  CV regular  Resp labored in episodes of pain     Cervix:dilatd per ER exam    USN demonstrates 14 week fetus in lower uterine segment/cervix, breech without cardiac activity    RH pos                     Assessment:   Amada Melo is a 14w4d pregnant female admitted with incomplete miscarriage.  Discussed options of D and C under anesthesia, expectant management and medical management with pain control, she would prefer to avoid D and C If able and is agreeable to trial of buccal cytotec and fentanyl.          Plan:   Admit - [facility] [place] [facility], patient and  would like to see baby and be informed of options for a [facility] service.  NPO except ice chips.  Discussed if life threatening bleeding, any concern for retained placenta or refractory pain indications for surgery.  All questions answered.    Alicia Mota MD

## 2022-12-18 NOTE — ED NOTES
EMERGENCY DEPARTMENT SIGN OUT NOTE        ED COURSE AND MEDICAL DECISION MAKING  4:10 PM Patient was signed out to me by Dr Stephan Leyva   4:40 PM Spoke with OB-GYN, Dr. Mota who recommends patient admission and states she will accept the patient under her service.  4:48 PM Rechecked and updated patient.     In brief, Amada Melo is a 32 year old female who initially presented vaginal bleeding with pregnancy and abdominal pain. The patient reports she is currently 15 weeks pregnant with her fourth pregnancy having had two live children. She states she was shoveling 3 days ago and afterwards had vaginal spotting. Then 2 days ago she underwent an ultrasound where they also saw vaginal spotting and found no fetal heartbeat. She notes that then today she developed abdominal cramping as well as additional vaginal spotting, with blood clots, which prompted her visit to the ED.    At time of sign out, disposition was pending OB-GYN recommendations.    Spoke with Dr. Mota who evaluated patient at the bedside and she was quite helpful and pleasant.  OB recommends admission for symptom control and observation.  OB recommended that no here in the emergency department.  On my exam patient appears comfortable, vitals reassuring.  Discussed OB recommendations and need for admission. Dr. Mtoa accepted the patient for admission.    FINAL IMPRESSION    1. Incomplete miscarriage    2. Miscarriage        ED MEDS  Medications   lactated ringers infusion ( Intravenous Not Given 12/18/22 2033)   fentaNYL (PF) (SUBLIMAZE) injection 25 mcg (has no administration in time range)   fentaNYL (PF) (SUBLIMAZE) injection 50 mcg (has no administration in time range)   fentaNYL (PF) (SUBLIMAZE) injection 25 mcg (has no administration in time range)   albuterol (PROVENTIL) neb solution 2.5 mg (has no administration in time range)   lidocaine 1 % 0.1-1 mL (has no administration in time range)   lidocaine (LMX4) cream (has no  administration in time range)   sodium chloride (PF) 0.9% PF flush 3 mL (has no administration in time range)   sodium chloride (PF) 0.9% PF flush 3 mL (has no administration in time range)   acetaminophen (TYLENOL) tablet 975 mg (975 mg Oral Given 12/18/22 2053)     Followed by   acetaminophen (TYLENOL) tablet 650 mg (has no administration in time range)   ketorolac (TORADOL) injection 15 mg ( Intravenous See Alternative 12/18/22 2053)     Or   ibuprofen (ADVIL/MOTRIN) tablet 800 mg (800 mg Oral Given 12/18/22 2053)   ondansetron (ZOFRAN ODT) ODT tab 4 mg (has no administration in time range)     Or   ondansetron (ZOFRAN) injection 4 mg (has no administration in time range)   prochlorperazine (COMPAZINE) injection 10 mg (has no administration in time range)     Or   prochlorperazine (COMPAZINE) tablet 10 mg (has no administration in time range)   senna-docusate (SENOKOT-S/PERICOLACE) 8.6-50 MG per tablet 1 tablet (has no administration in time range)   magnesium hydroxide (MILK OF MAGNESIA) suspension 30 mL (has no administration in time range)   bisacodyl (DULCOLAX) suppository 10 mg (has no administration in time range)   sodium phosphate (FLEET ENEMA) 1 enema (has no administration in time range)   lactated ringers infusion ( Intravenous Not Given 12/18/22 2033)   HYDROmorphone (DILAUDID) injection 0.2 mg (has no administration in time range)     Or   HYDROmorphone (DILAUDID) injection 0.4 mg (has no administration in time range)   oxyCODONE (ROXICODONE) tablet 5 mg (5 mg Oral Given 12/18/22 2227)     Or   oxyCODONE (ROXICODONE) tablet 10 mg ( Oral See Alternative 12/18/22 2227)   hydrOXYzine (ATARAX) tablet 50 mg (has no administration in time range)   albuterol (PROVENTIL HFA/VENTOLIN HFA) inhaler (has no administration in time range)   ALPRAZolam (XANAX) tablet 1 mg (has no administration in time range)   PARoxetine (PAXIL) tablet 40 mg (has no administration in time range)   naloxone (NARCAN) injection 0.2  mg (has no administration in time range)     Or   naloxone (NARCAN) injection 0.4 mg (has no administration in time range)     Or   naloxone (NARCAN) injection 0.2 mg (has no administration in time range)     Or   naloxone (NARCAN) injection 0.4 mg (has no administration in time range)   HYDROmorphone (PF) (DILAUDID) injection 0.5 mg (0.5 mg Intravenous Given 12/18/22 1513)   HYDROmorphone (PF) (DILAUDID) injection 0.5 mg (0.5 mg Intravenous Given 12/18/22 1608)   fentaNYL (PF) (SUBLIMAZE) injection 75 mcg (75 mcg Intravenous Given 12/18/22 1647)   misoprostol (CYTOTEC) tablet 800 mcg (800 mcg Buccal Given 12/18/22 1714)   tranexamic acid (CYKLOKAPRON) bolus 1 g vial attach to NaCl 50 or 100 mL bag ADULT (1 g Intravenous Given 12/18/22 1753)   azithromycin 500 mg (ZITHROMAX) in 0.9% NaCl 250 mL intermittent infusion 500 mg ( Intravenous Anesthesia Volume Adjustment 12/18/22 1911)   methylergonovine (METHERGINE) 0.2 MG/ML injection (  Override pull for Anesthesia 12/18/22 1856)       LAB  Labs Ordered and Resulted from Time of ED Arrival to Time of ED Departure   HCG QUALITATIVE PREGNANCY - Abnormal       Result Value    hCG Serum Qualitative Positive (*)    CBC WITH PLATELETS AND DIFFERENTIAL - Abnormal    WBC Count 6.6      RBC Count 3.78 (*)     Hemoglobin 11.5 (*)     Hematocrit 34.2 (*)     MCV 91      MCH 30.4      MCHC 33.6      RDW 13.0      Platelet Count 160      % Neutrophils 62      % Lymphocytes 28      % Monocytes 6      % Eosinophils 2      % Basophils 1      % Immature Granulocytes 1      NRBCs per 100 WBC 0      Absolute Neutrophils 4.1      Absolute Lymphocytes 1.9      Absolute Monocytes 0.4      Absolute Eosinophils 0.1      Absolute Basophils 0.0      Absolute Immature Granulocytes 0.1      Absolute NRBCs 0.0     HCG QUANTITATIVE PREGNANCY - Abnormal    hCG Quantitative 4,765 (*)        RADIOLOGY    US OB > 14 Weeks Complete w Transvaginal   Final Result   IMPRESSION:     1.  Fetal pole  identified within the endometrial canal. This is in the lower uterine segment and appears to be entering the cervix. The cervix is dilated. No fetal heart rate is identified.           DISCHARGE MEDS  Current Discharge Medication List          David Montez MD  [facility] EMERGENCY ROOM  [address] [id number] [phone]       David Montez MD  12/18/22 2313

## 2022-12-18 NOTE — ED TRIAGE NOTES
Pt is 15 weeks pregnant, had US that showed no fetal heart tones on Friday. Today started having sudden onset intense cramping with passing clots. Plan was for follow up on Monday     Triage Assessment     Row Name 12/18/22 1752       Triage Assessment (Adult)    Airway WDL WDL       Respiratory WDL    Respiratory WDL WDL       Skin Circulation/Temperature WDL    Skin Circulation/Temperature WDL WDL       Cardiac WDL    Cardiac WDL WDL       Peripheral/Neurovascular WDL    Peripheral Neurovascular WDL WDL       Cognitive/Neuro/Behavioral WDL    Cognitive/Neuro/Behavioral WDL WDL

## 2022-12-18 NOTE — PHARMACY-ADMISSION MEDICATION HISTORY
Pharmacy Note - Admission Medication History    Pertinent Provider Information: n/a     ______________________________________________________________________    Prior To Admission (PTA) med list completed and updated in EMR.       PTA Med List   Medication Sig Last Dose     albuterol (PROAIR HFA, PROVENTIL HFA, VENTOLIN HFA) 108 (90 BASE) MCG/ACT inhaler Inhale 2 puffs into the lungs every 4 hours as needed PRN     ALPRAZolam (XANAX) 1 MG tablet Take 1 mg by mouth 3 times daily as needed for anxiety 12/18/2022 at am     PARoxetine (PAXIL) 40 MG tablet Take 40 mg by mouth every morning 12/18/2022     Prenatal Vit-Fe Fumarate-FA (PRENATAL MULTIVITAMIN W/IRON) 27-0.8 MG tablet Take 1 tablet by mouth daily Past Week       Information source(s): Patient and CareEverywhere/[facility]  Method of interview communication: in-person    Summary of Changes to PTA Med List  New: prenatal, paxil, alprazolam  Discontinued: guaifenesin   Changed: albuterol to PRN    Patient was asked about OTC/herbal products specifically.  PTA med list reflects this.    In the past week, patient estimated taking medication this percent of the time:  greater than 90%.    Allergies were reviewed, assessed, and updated with the patient.      Patient does not anticipate needing any multi-use medications during admission.    The information provided in this note is only as accurate as the sources available at the time of the update(s).    Thank you for the opportunity to participate in the care of this patient.    Clarisa Fuentes [facility]  12/18/2022 4:10 PM

## 2022-12-19 VITALS
SYSTOLIC BLOOD PRESSURE: 122 MMHG | WEIGHT: 250 LBS | TEMPERATURE: 98.1 F | DIASTOLIC BLOOD PRESSURE: 66 MMHG | HEIGHT: 71 IN | RESPIRATION RATE: 18 BRPM | OXYGEN SATURATION: 98 % | HEART RATE: 93 BPM | BODY MASS INDEX: 35 KG/M2

## 2022-12-19 PROBLEM — O03.9 MISCARRIAGE: Status: ACTIVE | Noted: 2022-12-19

## 2022-12-19 PROBLEM — O03.4 INCOMPLETE MISCARRIAGE: Status: ACTIVE | Noted: 2022-12-19

## 2022-12-19 LAB
APTT PPP: 29 SECONDS (ref 22–38)
HBA1C MFR BLD: <4.2 %
HGB BLD-MCNC: 9.3 G/DL (ref 11.7–15.7)
INR PPP: 0.96 (ref 0.85–1.15)
TSH SERPL DL<=0.005 MIU/L-ACNC: 1.46 UIU/ML (ref 0.3–5)

## 2022-12-19 PROCEDURE — 85610 PROTHROMBIN TIME: CPT | Performed by: OBSTETRICS & GYNECOLOGY

## 2022-12-19 PROCEDURE — 85460 HEMOGLOBIN FETAL: CPT | Performed by: OBSTETRICS & GYNECOLOGY

## 2022-12-19 PROCEDURE — 85018 HEMOGLOBIN: CPT | Performed by: OBSTETRICS & GYNECOLOGY

## 2022-12-19 PROCEDURE — 722N000001 HC LABOR CARE VAGINAL DELIVERY SINGLE

## 2022-12-19 PROCEDURE — 83036 HEMOGLOBIN GLYCOSYLATED A1C: CPT | Performed by: OBSTETRICS & GYNECOLOGY

## 2022-12-19 PROCEDURE — 36415 COLL VENOUS BLD VENIPUNCTURE: CPT | Performed by: OBSTETRICS & GYNECOLOGY

## 2022-12-19 PROCEDURE — 85730 THROMBOPLASTIN TIME PARTIAL: CPT | Performed by: OBSTETRICS & GYNECOLOGY

## 2022-12-19 PROCEDURE — G0378 HOSPITAL OBSERVATION PER HR: HCPCS

## 2022-12-19 PROCEDURE — 85390 FIBRINOLYSINS SCREEN I&R: CPT | Mod: 26 | Performed by: PATHOLOGY

## 2022-12-19 PROCEDURE — 84443 ASSAY THYROID STIM HORMONE: CPT | Performed by: OBSTETRICS & GYNECOLOGY

## 2022-12-19 PROCEDURE — 250N000013 HC RX MED GY IP 250 OP 250 PS 637: Performed by: OBSTETRICS & GYNECOLOGY

## 2022-12-19 RX ORDER — ACETAMINOPHEN 325 MG/1
650 TABLET ORAL EVERY 4 HOURS PRN
Status: DISCONTINUED | OUTPATIENT
Start: 2022-12-19 | End: 2022-12-19 | Stop reason: HOSPADM

## 2022-12-19 RX ORDER — DIPHENOXYLATE HCL/ATROPINE 2.5-.025MG
2 TABLET ORAL ONCE
Status: COMPLETED | OUTPATIENT
Start: 2022-12-19 | End: 2022-12-19

## 2022-12-19 RX ORDER — ALPRAZOLAM 1 MG
1 TABLET ORAL 3 TIMES DAILY PRN
Qty: 4 TABLET | Refills: 0 | Status: ON HOLD | OUTPATIENT
Start: 2022-12-19 | End: 2023-11-16

## 2022-12-19 RX ORDER — DIPHENOXYLATE HCL/ATROPINE 2.5-.025MG
2 TABLET ORAL EVERY 6 HOURS PRN
Status: DISCONTINUED | OUTPATIENT
Start: 2022-12-19 | End: 2022-12-19 | Stop reason: HOSPADM

## 2022-12-19 RX ORDER — OXYCODONE HYDROCHLORIDE 10 MG/1
10 TABLET ORAL EVERY 4 HOURS PRN
Qty: 8 TABLET | Refills: 0 | Status: SHIPPED | OUTPATIENT
Start: 2022-12-19 | End: 2022-12-25

## 2022-12-19 RX ORDER — ALPRAZOLAM 0.5 MG
1 TABLET ORAL 3 TIMES DAILY PRN
Status: DISCONTINUED | OUTPATIENT
Start: 2022-12-19 | End: 2022-12-19 | Stop reason: HOSPADM

## 2022-12-19 RX ADMIN — OXYCODONE HYDROCHLORIDE 10 MG: 5 TABLET ORAL at 02:34

## 2022-12-19 RX ADMIN — OXYCODONE HYDROCHLORIDE 10 MG: 5 TABLET ORAL at 06:44

## 2022-12-19 RX ADMIN — SENNOSIDES AND DOCUSATE SODIUM 1 TABLET: 50; 8.6 TABLET ORAL at 09:25

## 2022-12-19 RX ADMIN — ACETAMINOPHEN 975 MG: 325 TABLET ORAL at 06:44

## 2022-12-19 RX ADMIN — HYDROXYZINE HYDROCHLORIDE 50 MG: 25 TABLET ORAL at 13:18

## 2022-12-19 RX ADMIN — ALPRAZOLAM 1 MG: 0.5 TABLET ORAL at 18:18

## 2022-12-19 RX ADMIN — IBUPROFEN 800 MG: 800 TABLET ORAL at 17:50

## 2022-12-19 RX ADMIN — IBUPROFEN 800 MG: 800 TABLET ORAL at 03:05

## 2022-12-19 RX ADMIN — OXYCODONE HYDROCHLORIDE 10 MG: 5 TABLET ORAL at 12:44

## 2022-12-19 RX ADMIN — PAROXETINE HYDROCHLORIDE 40 MG: 20 TABLET, FILM COATED ORAL at 09:26

## 2022-12-19 RX ADMIN — ALPRAZOLAM 1 MG: 0.5 TABLET ORAL at 09:26

## 2022-12-19 RX ADMIN — IBUPROFEN 800 MG: 800 TABLET ORAL at 09:25

## 2022-12-19 RX ADMIN — ACETAMINOPHEN 975 MG: 325 TABLET ORAL at 12:44

## 2022-12-19 ASSESSMENT — ACTIVITIES OF DAILY LIVING (ADL)
ADLS_ACUITY_SCORE: 20

## 2022-12-19 NOTE — PROGRESS NOTES
Patient and partner wish for fetal remains to go to [facility] on [facility].

## 2022-12-19 NOTE — ANESTHESIA PREPROCEDURE EVALUATION
"Anesthesia Pre-Procedure Evaluation    Patient: Amada Melo   MRN: [id number] : 1990        Procedure : Procedure(s):  DILATION AND CURETTAGE, UTERUS, USING SUCTION          Past Medical History:   Diagnosis Date     ADD (attention deficit disorder)      Genital herpes      PTSD (post-traumatic stress disorder)      Uncomplicated asthma       Past Surgical History:   Procedure Laterality Date     ENT SURGERY       TONSILLECTOMY       WISDOM TOOTH EXTRACTION        Allergies   Allergen Reactions     Nickel      Skin irritation      Penicillins Hives     Steri Strips      blister      Social History     Tobacco Use     Smoking status: Former     Packs/day: 0.50     Types: Cigarettes     Quit date: 2018     Years since quittin.2     Smokeless tobacco: Never     Tobacco comments:     5 cigs per day---also does e-cig   Substance Use Topics     Alcohol use: Yes     Alcohol/week: 0.0 standard drinks     Comment: Alcoholic Drinks/day: \"sometimes\" - not in pregnancy      Wt Readings from Last 1 Encounters:   22 113.4 kg (250 lb)        Anesthesia Evaluation   Pt has had prior anesthetic.         ROS/MED HX  ENT/Pulmonary:  - neg pulmonary ROS   (+) tobacco use, Past use, asthma Treatment: Inhaler prn,      Neurologic:  - neg neurologic ROS     Cardiovascular:  - neg cardiovascular ROS     METS/Exercise Tolerance:     Hematologic:  - neg hematologic  ROS     Musculoskeletal:  - neg musculoskeletal ROS     GI/Hepatic:  - neg GI/hepatic ROS     Renal/Genitourinary:  - neg Renal ROS     Endo:  - neg endo ROS   (+) Obesity (bmi 35),     Psychiatric/Substance Use:  - neg psychiatric ROS     Infectious Disease:  - neg infectious disease ROS     Malignancy:  - neg malignancy ROS     Other: Comment: PTSD  ADD - neg other ROS          Physical Exam    Airway  airway exam normal           Respiratory Devices and Support         Dental  no notable dental history         Cardiovascular   cardiovascular exam " normal          Pulmonary   pulmonary exam normal                OUTSIDE LABS:  CBC:   Lab Results   Component Value Date    WBC 6.6 12/18/2022    WBC 4.6 11/15/2022    HGB 11.5 (L) 12/18/2022    HGB 12.2 11/15/2022    HCT 34.2 (L) 12/18/2022    HCT 35.6 11/15/2022     12/18/2022     11/15/2022     BMP:   Lab Results   Component Value Date    CR 0.61 04/02/2019     COAGS:   Lab Results   Component Value Date    PTT 27 04/02/2019    INR 0.99 04/02/2019     POC:   Lab Results   Component Value Date    HCGS Positive (A) 12/18/2022     HEPATIC:   Lab Results   Component Value Date    ALT 12 04/02/2019    AST 11 04/02/2019     OTHER:   Lab Results   Component Value Date    TSH 1.29 08/05/2019       Anesthesia Plan    ASA Status:  3, emergent    NPO Status:  NPO Appropriate    Anesthesia Type: MAC.     - Reason for MAC: immobility needed, straight local not clinically adequate      Maintenance: TIVA.        Consents    Anesthesia Plan(s) and associated risks, benefits, and realistic alternatives discussed. Questions answered and patient/representative(s) expressed understanding.    - Discussed:     - Discussed with:  Patient         Postoperative Care    Pain management: Multi-modal analgesia.   PONV prophylaxis: Ondansetron (or other 5HT-3), Dexamethasone or Solumedrol     Comments:    Other Comments: Patient would strongly prefer to avoid general anesthesia. Also not interested in neuraxial anesthesia. Discussed possibility to proceed with monitored anesthesia care - patient OK with versed/fentanyl, and aware that versed may provide some amnesia as well.     Reviewed anesthetic options and risks. Patient agrees to proceed.             Henrry Dallas MD

## 2022-12-19 NOTE — PROGRESS NOTES
Muna of the [facility] support services called on behalf of patient. Patient requesting photography, Muna will call unit when available to come.     Farrukh Moreno RN

## 2022-12-19 NOTE — DISCHARGE INSTRUCTIONS
"Discharge Instructions   We recommend you see your provider to check on your physical and emotional recovery, and to walk through your experience within 1-2 weeks.  Any further recommendations for follow up will be discussed with your provider at that time.       The Blues and Grief  After delivery you will experience hormone changes and strong emotions, often referred to as the \"baby blues\".  You may find yourself easily upset, tearful or angry.  In addition, you will be grieving for your own loss and may feel terribly tired and not want to face friends, family or new babies.    Your feelings will be hard to predict during this time.  You may have many ups and downs.  Be kind and patient with yourself.    No two people grieve the same way.  This is true of spouses and partners.  Try to have open communication, but don't depend solely on each other for support.  Reach out to friends, family, clergy and your health care providers.  You don't have to handle this alone.    Normal grief after a pregnancy loss often lasts for weeks or months.  Over time, you will have more good days than bad ones.  The first year is usually the hardest as you face significant dates and events for the first time.    At first, your sadness or anxiety may keep you from sleeping, eating, being with others or getting out of the house.  If, after a week, you aren't able to take care of basic daily tasks, please call your care provider right away.  It could be more serious than the blues or grief.  Going back to work:  Even though you did not bring home a living baby, you and your partner have a right to any family and bereavement leave benefits your employer offers.  When you do return to work, be prepared for some adjustment time.    Call your health care provider if you have any of these symptoms:  You soak a sanitary pad with blood within 1 hour, or you see blood clots larger than a golf ball.  Bleeding that lasts more than 6 weeks.  You " have vaginal discharge that smells bad.   A fever above 100.4 F (38 C), with or without chills  Severe, pain, cramping or tenderness in your lower belly area.  If you have pain that increases or does not go away from an episiotomy or perineal tear  Increased pain, swelling, redness or fluid around your stitches.  A need to urinate more frequently (use the toilet more often), more urgently (use the toilet very quickly), or it burns when you urinate.  Redness, swelling or pain around a vein in your leg.  Problems with coping with sadness, anxiety, or depression.  Your breasts are engorged (hard and swollen), red and very tender and you have a fever.   If you have nausea and vomiting.  If you have chest pain and cough or are gasping for air.  You have questions or concerns after you return home.    Keep your hands clean:  Always wash your hands before touching your perineal area.  This helps reduce your risk of infection.  If your hands aren't dirty, you may use an alcohol hand-rub to clean your hands. Keep your nails clean and short.

## 2022-12-19 NOTE — UTILIZATION REVIEW
Admission Status; Secondary Review Determination       As part of the [facility] Utilization review plan, a self-audit is done on Medicare inpatient admission with less than 2 midnights stay. The 2014 IPPS Final Rule allows outpatient billing in the event that a hospital determines that an inpatient admission was not medically necessary under utilization review process.          (x) Outpatient status would be Appropriate- Short Stay- Post discharge review.     RATIONALE FOR DETERMINATION   32-year-old female 14 weeks 4 days pregnant admitted for miscarriage.  Was found to have fetal demise in the clinic on 12/16/2022 and on 12/18/2022 presented to the ER with small amount of bleeding but intense cramps similar to labor.  This account and medical record number for a Medicare beneficiary was an inpatient short stay (<2 midnights) and didn't meet medical necessity for inpatient admission. We recommend billing outpatient services based on the severity of illness, intensity of service provided and the inpatient length of stay.  Please contact me within one week of receiving this letter only if you disagree with this determination. If you concur, no further action is needed.  She was still taken to the OR on 1218 for suction D&C.  Hemoglobin was 11.5 yesterday and 9.3 today.  Orders written for discharge home today.          The information on this document is developed by the utilization review team in order for the business office to ensure compliance.  This only denotes the appropriateness of proper admission status and does not reflect the quality of care rendered.         The definitions of Inpatient Status and Observation Status used in making the determination above are those provided in the CMS Coverage Manual, Chapter 1 and Chapter 6, section 70.4.      Sincerely,     Ismael Dos Santos MD  Utilization  Management  [facility].

## 2022-12-19 NOTE — OP NOTE
PROCEDURE: SUCTION D & C    DATE OF SERVICE: 12/18/2022    PREOPERATIVE DIAGNOSIS: incomplete miscarriage at 14 weeks gestation    POSTOPERATIVE DIAGNOSIS: Same    PROCEDURE: Suction dilatation and curettage    SURGEON(S): Alicia Mota MD    ANESTHESIA: MAC    ESTIMATED BLOOD LOSS: 150 mL in OR, approx 1200 mL QBL in room at time of delivery/miscarriage    SPECIMENS: Uterine contents, sent to pathology    COMPLICATIONS: None.     HISTORY OF PRESENT ILLNESS:  This is a 32 year old female with a known fetal demise who presented to the ER with pain, a dilated cervix and usn demonstrating breech fetus in the lower uterine segment.  Patient desired expectant management and received buccal cytotec and had a precipitous delivery of the fetus in her maternity care room.  The placenta did not deliver and she had marked bleeding on exam, decision was made to proceed to exam under anesthesia and dilation and curettage.  OPERATIVE FINDINGS:  Moderate amount of products of conception, cervix dilated to loose one cm.    PROCEDURE NOTE:  Patient was brought to the operating room and after induction of anesthesia was prepped and draped in the dorsal lithotomy position.  A time out was called and the patient and the procedure were verified.  A bimanual exam was done.   A sterile speculum was placed.  The anterior lip of the cervix was grasped with a ring forcep.  Nini cervical dilators were used probe the cervical canal and it was already dilated to 12.  A # 12 suction curette was induced and rotated to clear the uterus of all products of conception.  A sharp curette was introduced. Once it was felt that all tissue was removed a decision was made to terminate the procedure. Sponge and instrument counts were correct. Patient tolerated the procedure well and was taken to the recovery room in good condition.      Alicia Mota MD      Cc: Gretchen Orozco
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
## 2022-12-19 NOTE — PROGRESS NOTES
Patient's pain tolerable, controlled with ibuprofen, tylenol, and oxycodone PRN. Patient taking PRN alprazolam and atarax for anxiety. Offered foot massage and declined, offered aroma therapy and tub bath with epsom salts and patient agreed.     Muna rivas present at bedside for photography.

## 2022-12-19 NOTE — ANESTHESIA CARE TRANSFER NOTE
Patient: Amada Melo    Procedure: Procedure(s):  DILATION AND CURETTAGE, UTERUS, USING SUCTION  EXAM UNDER ANESTHESIA, GYNECOLOGIC       Diagnosis: Miscarriage [O03.9]  Incomplete miscarriage [O03.4]  Diagnosis Additional Information: No value filed.    Anesthesia Type:   MAC     Note:    Oropharynx: oropharynx clear of all foreign objects and spontaneously breathing  Level of Consciousness: awake  Oxygen Supplementation: room air    Independent Airway: airway patency satisfactory and stable    Vital Signs Stable: post-procedure vital signs reviewed and stable  Report to RN Given: handoff report given  Patient transferred to: Labor and Delivery    Handoff Report: Identifed the Patient, Identified the Reponsible Provider, Reviewed the pertinent medical history, Discussed the surgical course, Reviewed Intra-OP anesthesia mangement and issues during anesthesia, Set expectations for post-procedure period and Allowed opportunity for questions and acknowledgement of understanding      Vitals:  Vitals Value Taken Time   /70 12/18/22 1911   Temp     Pulse 95 12/18/22 1911   Resp 20 12/18/22 1911   SpO2 95 % 12/18/22 1911       Electronically Signed By: NAKIA Dobson CRNA  December 18, 2022  7:12 PM

## 2022-12-19 NOTE — ANESTHESIA POSTPROCEDURE EVALUATION
Patient: Amada Melo    Procedure: Procedure(s):  DILATION AND CURETTAGE, UTERUS, USING SUCTION  EXAM UNDER ANESTHESIA, GYNECOLOGIC       Anesthesia Type:  MAC    Note:     Postop Pain Control: Uneventful            Sign Out: Well controlled pain   PONV: No   Neuro/Psych: Uneventful            Sign Out: Acceptable/Baseline neuro status   Airway/Respiratory: Uneventful            Sign Out: Acceptable/Baseline resp. status   CV/Hemodynamics: Uneventful            Sign Out: Acceptable CV status; No obvious hypovolemia; No obvious fluid overload   Other NRE: NONE   DID A NON-ROUTINE EVENT OCCUR? No           Last vitals:  Vitals:    12/18/22 2016 12/18/22 2017 12/18/22 2021   BP:  115/58    Pulse:      Resp:  16    Temp:  36.9  C (98.4  F)    SpO2: 97%  97%       Electronically Signed By: Amalia Rodriguez MD  December 18, 2022  8:41 PM

## 2022-12-20 ENCOUNTER — TELEPHONE (OUTPATIENT)
Dept: OBGYN | Facility: CLINIC | Age: 32
End: 2022-12-20

## 2022-12-20 LAB
DRVVT SCREEN RATIO: 1.07
FETAL RBC % LFV: 0 %
FETAL RBC (ML): 0 ML
IF INDICATED RECOMMENDED DOSE OF RH IMMUNE GLOBULIN UG: 300 UG
LA PPP-IMP: NEGATIVE
LUPUS INTERPRETATION: NORMAL
PTT RATIO: 1.15
THROMBIN TIME: 15.9 SECONDS (ref 13–19)

## 2022-12-20 NOTE — TELEPHONE ENCOUNTER
I left a message with pt after discharging yesterday with a 14.4 week loss. I provided a call back orlando coe for pt to call if she needs anything.

## 2022-12-20 NOTE — PROGRESS NOTES
[facility]  home on [address] In [place] contacted at this time, they will call back when someone is ready to accept fetal remains.

## 2022-12-21 LAB
PATH REPORT.COMMENTS IMP SPEC: NORMAL
PATH REPORT.COMMENTS IMP SPEC: NORMAL
PATH REPORT.FINAL DX SPEC: NORMAL
PATH REPORT.GROSS SPEC: NORMAL
PATH REPORT.MICROSCOPIC SPEC OTHER STN: NORMAL
PATH REPORT.RELEVANT HX SPEC: NORMAL
PHOTO IMAGE: NORMAL

## 2022-12-25 ENCOUNTER — APPOINTMENT (OUTPATIENT)
Dept: CT IMAGING | Facility: CLINIC | Age: 32
End: 2022-12-25
Attending: EMERGENCY MEDICINE
Payer: COMMERCIAL

## 2022-12-25 ENCOUNTER — HOSPITAL ENCOUNTER (EMERGENCY)
Facility: CLINIC | Age: 32
Discharge: HOME | End: 2022-12-25
Attending: EMERGENCY MEDICINE | Admitting: EMERGENCY MEDICINE
Payer: COMMERCIAL

## 2022-12-25 ENCOUNTER — APPOINTMENT (OUTPATIENT)
Dept: ULTRASOUND IMAGING | Facility: CLINIC | Age: 32
End: 2022-12-25
Attending: EMERGENCY MEDICINE
Payer: COMMERCIAL

## 2022-12-25 VITALS
HEART RATE: 81 BPM | HEIGHT: 71 IN | WEIGHT: 277 LBS | TEMPERATURE: 97.2 F | RESPIRATION RATE: 20 BRPM | SYSTOLIC BLOOD PRESSURE: 118 MMHG | BODY MASS INDEX: 38.78 KG/M2 | OXYGEN SATURATION: 100 % | DIASTOLIC BLOOD PRESSURE: 69 MMHG

## 2022-12-25 DIAGNOSIS — R10.84 ABDOMINAL PAIN, GENERALIZED: ICD-10-CM

## 2022-12-25 PROBLEM — O35.8XX0 CYSTIC HYGROMA OF FETUS IN SINGLETON PREGNANCY: Status: ACTIVE | Noted: 2018-02-21

## 2022-12-25 PROBLEM — O09.90 SUPERVISION OF HIGH-RISK PREGNANCY: Status: ACTIVE | Noted: 2018-02-13

## 2022-12-25 PROBLEM — O22.21: Status: ACTIVE | Noted: 2018-11-24

## 2022-12-25 LAB
ALBUMIN UR-MCNC: 10 MG/DL
ANION GAP SERPL CALCULATED.3IONS-SCNC: 8 MMOL/L (ref 5–18)
APPEARANCE UR: CLEAR
BACTERIA #/AREA URNS HPF: ABNORMAL /HPF
BILIRUB UR QL STRIP: NEGATIVE
BUN SERPL-MCNC: 9 MG/DL (ref 8–22)
CALCIUM SERPL-MCNC: 9.1 MG/DL (ref 8.5–10.5)
CHLORIDE BLD-SCNC: 108 MMOL/L (ref 98–107)
CO2 SERPL-SCNC: 22 MMOL/L (ref 22–31)
COLOR UR AUTO: ABNORMAL
CREAT SERPL-MCNC: 0.63 MG/DL (ref 0.6–1.1)
ERYTHROCYTE [DISTWIDTH] IN BLOOD BY AUTOMATED COUNT: 13.9 % (ref 10–15)
GFR SERPL CREATININE-BSD FRML MDRD: >90 ML/MIN/1.73M2
GLUCOSE BLD-MCNC: 120 MG/DL (ref 70–125)
GLUCOSE UR STRIP-MCNC: NEGATIVE MG/DL
HCT VFR BLD AUTO: 28.5 % (ref 35–47)
HGB BLD-MCNC: 9.5 G/DL (ref 11.7–15.7)
HGB UR QL STRIP: ABNORMAL
KETONES UR STRIP-MCNC: NEGATIVE MG/DL
LEUKOCYTE ESTERASE UR QL STRIP: NEGATIVE
MCH RBC QN AUTO: 31 PG (ref 26.5–33)
MCHC RBC AUTO-ENTMCNC: 33.3 G/DL (ref 31.5–36.5)
MCV RBC AUTO: 93 FL (ref 78–100)
MUCOUS THREADS #/AREA URNS LPF: PRESENT /LPF
NITRATE UR QL: NEGATIVE
PH UR STRIP: 5.5 [PH] (ref 5–7)
PLATELET # BLD AUTO: 175 10E3/UL (ref 150–450)
POTASSIUM BLD-SCNC: 4 MMOL/L (ref 3.5–5)
RBC # BLD AUTO: 3.06 10E6/UL (ref 3.8–5.2)
RBC URINE: 129 /HPF
SODIUM SERPL-SCNC: 138 MMOL/L (ref 136–145)
SP GR UR STRIP: 1.03 (ref 1–1.03)
SQUAMOUS EPITHELIAL: 6 /HPF
UROBILINOGEN UR STRIP-MCNC: <2 MG/DL
WBC # BLD AUTO: 5 10E3/UL (ref 4–11)
WBC URINE: 11 /HPF

## 2022-12-25 PROCEDURE — 87086 URINE CULTURE/COLONY COUNT: CPT | Performed by: EMERGENCY MEDICINE

## 2022-12-25 PROCEDURE — 250N000011 HC RX IP 250 OP 636: Performed by: EMERGENCY MEDICINE

## 2022-12-25 PROCEDURE — 96375 TX/PRO/DX INJ NEW DRUG ADDON: CPT

## 2022-12-25 PROCEDURE — 250N000013 HC RX MED GY IP 250 OP 250 PS 637: Performed by: EMERGENCY MEDICINE

## 2022-12-25 PROCEDURE — 96376 TX/PRO/DX INJ SAME DRUG ADON: CPT

## 2022-12-25 PROCEDURE — 258N000003 HC RX IP 258 OP 636: Performed by: EMERGENCY MEDICINE

## 2022-12-25 PROCEDURE — 81001 URINALYSIS AUTO W/SCOPE: CPT | Performed by: EMERGENCY MEDICINE

## 2022-12-25 PROCEDURE — 99285 EMERGENCY DEPT VISIT HI MDM: CPT | Mod: 25

## 2022-12-25 PROCEDURE — 96374 THER/PROPH/DIAG INJ IV PUSH: CPT | Mod: 59

## 2022-12-25 PROCEDURE — 96361 HYDRATE IV INFUSION ADD-ON: CPT

## 2022-12-25 PROCEDURE — 76830 TRANSVAGINAL US NON-OB: CPT

## 2022-12-25 PROCEDURE — 36415 COLL VENOUS BLD VENIPUNCTURE: CPT | Performed by: EMERGENCY MEDICINE

## 2022-12-25 PROCEDURE — 80048 BASIC METABOLIC PNL TOTAL CA: CPT | Performed by: EMERGENCY MEDICINE

## 2022-12-25 PROCEDURE — 85027 COMPLETE CBC AUTOMATED: CPT | Performed by: EMERGENCY MEDICINE

## 2022-12-25 PROCEDURE — 74177 CT ABD & PELVIS W/CONTRAST: CPT

## 2022-12-25 RX ORDER — ONDANSETRON 2 MG/ML
4 INJECTION INTRAMUSCULAR; INTRAVENOUS ONCE
Status: COMPLETED | OUTPATIENT
Start: 2022-12-25 | End: 2022-12-25

## 2022-12-25 RX ORDER — OXYCODONE HYDROCHLORIDE 5 MG/1
5 TABLET ORAL ONCE
Status: COMPLETED | OUTPATIENT
Start: 2022-12-25 | End: 2022-12-25

## 2022-12-25 RX ORDER — KETOROLAC TROMETHAMINE 15 MG/ML
15 INJECTION, SOLUTION INTRAMUSCULAR; INTRAVENOUS ONCE
Status: COMPLETED | OUTPATIENT
Start: 2022-12-25 | End: 2022-12-25

## 2022-12-25 RX ORDER — HYDROMORPHONE HYDROCHLORIDE 1 MG/ML
0.5 INJECTION, SOLUTION INTRAMUSCULAR; INTRAVENOUS; SUBCUTANEOUS ONCE
Status: COMPLETED | OUTPATIENT
Start: 2022-12-25 | End: 2022-12-25

## 2022-12-25 RX ORDER — OXYCODONE HYDROCHLORIDE 5 MG/1
5 TABLET ORAL EVERY 6 HOURS PRN
Qty: 12 TABLET | Refills: 0 | Status: SHIPPED | OUTPATIENT
Start: 2022-12-25 | End: 2022-12-28

## 2022-12-25 RX ORDER — ACETAMINOPHEN 325 MG/1
650 TABLET ORAL ONCE
Status: COMPLETED | OUTPATIENT
Start: 2022-12-25 | End: 2022-12-25

## 2022-12-25 RX ORDER — DOXYCYCLINE 100 MG/1
100 CAPSULE ORAL 2 TIMES DAILY
Qty: 14 CAPSULE | Refills: 0 | Status: SHIPPED | OUTPATIENT
Start: 2022-12-25 | End: 2023-01-01

## 2022-12-25 RX ORDER — IOPAMIDOL 755 MG/ML
100 INJECTION, SOLUTION INTRAVASCULAR ONCE
Status: COMPLETED | OUTPATIENT
Start: 2022-12-25 | End: 2022-12-25

## 2022-12-25 RX ADMIN — KETOROLAC TROMETHAMINE 15 MG: 15 INJECTION, SOLUTION INTRAMUSCULAR; INTRAVENOUS at 21:10

## 2022-12-25 RX ADMIN — HYDROMORPHONE HYDROCHLORIDE 1 MG: 1 INJECTION, SOLUTION INTRAMUSCULAR; INTRAVENOUS; SUBCUTANEOUS at 15:14

## 2022-12-25 RX ADMIN — ONDANSETRON 4 MG: 2 INJECTION INTRAMUSCULAR; INTRAVENOUS at 13:49

## 2022-12-25 RX ADMIN — ACETAMINOPHEN 650 MG: 325 TABLET ORAL at 18:02

## 2022-12-25 RX ADMIN — SODIUM CHLORIDE 1000 ML: 9 INJECTION, SOLUTION INTRAVENOUS at 19:40

## 2022-12-25 RX ADMIN — OXYCODONE HYDROCHLORIDE 5 MG: 5 TABLET ORAL at 19:39

## 2022-12-25 RX ADMIN — HYDROMORPHONE HYDROCHLORIDE 1 MG: 1 INJECTION, SOLUTION INTRAMUSCULAR; INTRAVENOUS; SUBCUTANEOUS at 13:53

## 2022-12-25 RX ADMIN — IOPAMIDOL 100 ML: 755 INJECTION, SOLUTION INTRAVENOUS at 18:21

## 2022-12-25 RX ADMIN — HYDROMORPHONE HYDROCHLORIDE 0.5 MG: 1 INJECTION, SOLUTION INTRAMUSCULAR; INTRAVENOUS; SUBCUTANEOUS at 18:03

## 2022-12-25 ASSESSMENT — ACTIVITIES OF DAILY LIVING (ADL)
ADLS_ACUITY_SCORE: 35

## 2022-12-25 NOTE — ED TRIAGE NOTES
Arrives to ED with c/o lower abd pain, HA and dizziness. Recent miscarriage with D&C on 12/18. Reports pressure/cramping abd pain that radiates to back. Denies bleeding.      Triage Assessment     Row Name 12/25/22 1313       Triage Assessment (Adult)    Airway WDL WDL       Respiratory WDL    Respiratory WDL WDL       Skin Circulation/Temperature WDL    Skin Circulation/Temperature WDL WDL       Cardiac WDL    Cardiac WDL X;rhythm    Pulse Rate & Regularity tachycardic       Peripheral/Neurovascular WDL    Peripheral Neurovascular WDL WDL       Cognitive/Neuro/Behavioral WDL    Cognitive/Neuro/Behavioral WDL WDL

## 2022-12-25 NOTE — ED PROVIDER NOTES
EMERGENCY DEPARTMENT ENCOUnter      NAME: Amada Melo  AGE: 32 year old female  YOB: 1990  MRN: [id number]  EVALUATION DATE & TIME: 2022  1:21 PM    PCP: Gretchen Orozco    ED PROVIDER: Juan J Talbert DO      Chief Complaint   Patient presents with     Abdominal Pain     Headache     Dizziness         FINAL IMPRESSION:  1. Abdominal pain, generalized          ED COURSE & MEDICAL DECISION MAKIN:30 PM Patient was placed in room 19. I met with the patient, obtained history, performed an initial exam, and discussed options and plan for diagnostics and treatment here in the ED.     The patient presented to the emergency department today with complaints of diffuse abdominal pain.  She recently had a D&C after miscarriage.  She also has some mild ongoing bleeding.  Laboratory tests and ultrasound have been ordered.  Ultrasound is pending at the time of signout to the oncoming provider.  See their note for further details.        At the conclusion of the encounter I discussed the results of all of the tests and the disposition. The questions were answered. The patient or family acknowledged understanding and was agreeable with the care plan.         MEDICATIONS GIVEN IN THE EMERGENCY:  Medications   HYDROmorphone (DILAUDID) injection 1 mg (1 mg Intravenous Given 22 1353)   ondansetron (ZOFRAN) injection 4 mg (4 mg Intravenous Given 22 1349)   HYDROmorphone (DILAUDID) injection 1 mg (1 mg Intravenous Given 22 1514)         =================================================================    HPI        Amada Melo is a 32 year old female with a pertinent history of low back pain with sciatica who presents to this ED by walk in for evaluation of abdominal pain.    Patient had a miscarriage and is s/p dilation and curettage suction on 2022.     Since the patient miscarried and underwent D&C procedure 1 week ago, she has been experiencing lower abdominal pain  and pressure with low back pain, dizziness, and intermittent headaches. Denies vaginal bleeding. Patient claims she had a previous D&C and had a lot of vagina bleeding after but recovered in less than 2 days. Patient was seen by OB on 12/22 and US showed that the patient still had residual lining. She denies any other complaints at this time.       REVIEW OF SYSTEMS     Constitutional:  Denies fever or chills  HENT:  Denies sore throat   Respiratory:  Denies cough or shortness of breath   Cardiovascular:  Denies chest pain or palpitations  GI:  Endorses abdominal pain. Denies nausea, or vomiting  : Denies vaginal bleeding  Musculoskeletal:  Endorses back pain. Denies any new extremity pain   Skin:  Denies rash   Neurologic:  Endorses dizziness and headache. Denies focal weakness or sensory changes    All other systems reviewed and are negative      PAST MEDICAL HISTORY:  Past Medical History:   Diagnosis Date     ADD (attention deficit disorder)      Genital herpes      PTSD (post-traumatic stress disorder)      Uncomplicated asthma        PAST SURGICAL HISTORY:  Past Surgical History:   Procedure Laterality Date     DILATION AND CURETTAGE SUCTION N/A 12/18/2022    Procedure: DILATION AND CURETTAGE, UTERUS, USING SUCTION;  Surgeon: Alicia Mota MD;  Location: [facility] OR     ENT SURGERY       PELVIC EXAMINATION UNDER ANESTHESIA N/A 12/18/2022    Procedure: EXAM UNDER ANESTHESIA, GYNECOLOGIC;  Surgeon: Alicia Mota MD;  Location: [facility] OR     TONSILLECTOMY       WISDOM TOOTH EXTRACTION             CURRENT MEDICATIONS:    albuterol (PROAIR HFA, PROVENTIL HFA, VENTOLIN HFA) 108 (90 BASE) MCG/ACT inhaler  ALPRAZolam (XANAX) 1 MG tablet  oxyCODONE (ROXICODONE) 10 MG tablet  PARoxetine (PAXIL) 40 MG tablet  Prenatal Vit-Fe Fumarate-FA (PRENATAL MULTIVITAMIN W/IRON) 27-0.8 MG tablet        ALLERGIES:  Allergies   Allergen Reactions     Nickel      Skin irritation      Penicillins  "Hives     Steri Strips      blister       FAMILY HISTORY:  Family History   Problem Relation Age of Onset     Depression Mother      Depression Father      Asthma Mother      Cervical Cancer Mother      Cerebrovascular Disease Maternal Grandmother      Hypercalcemia Paternal Grandmother      Hypertension Paternal Grandmother        SOCIAL HISTORY:   Social History     Socioeconomic History     Marital status: Legally      Spouse name: seperated for 4 years     Number of children: 1     Years of education: None     Highest education level: None   Occupational History     Occupation: unemployed     Employer: [employer]   Tobacco Use     Smoking status: Former     Packs/day: 0.50     Types: Cigarettes     Quit date: 2018     Years since quittin.3     Smokeless tobacco: Never     Tobacco comments:     5 cigs per day---also does e-cig   Substance and Sexual Activity     Alcohol use: Yes     Alcohol/week: 0.0 standard drinks     Comment: Alcoholic Drinks/day: \"sometimes\" - not in pregnancy     Drug use: No     Sexual activity: Not Currently       VITALS:  Patient Vitals for the past 24 hrs:   BP Temp Temp src Pulse Resp SpO2 Height Weight   22 1515 119/60 -- -- 95 -- 96 % -- --   22 1430 (!) 151/84 -- -- 105 -- 97 % -- --   22 1415 (!) 140/79 -- -- 98 -- 96 % -- --   22 1400 134/75 -- -- 95 -- 97 % -- --   22 1317 134/82 97.2  F (36.2  C) Temporal 109 20 98 % 1.803 m (5' 11\") 125.6 kg (277 lb)       PHYSICAL EXAM    Constitutional:  Well developed, Well nourished,  HENT:  Normocephalic, Atraumatic, Bilateral external ears normal, Oropharynx moist, Nose normal.   Neck:  Normal range of motion, No meningismus, No stridor.   Eyes:  EOMI, Conjunctiva normal, No discharge.   Respiratory:  Normal breath sounds, No respiratory distress, No wheezing  Cardiovascular:  Normal heart rate, Normal rhythm  GI:  Soft, diffuse abdominal tenderness, No guarding  Musculoskeletal:   No " tenderness to palpation or major deformities noted.   Integument:  Warm, Dry, No erythema, No rash.   Neurologic:  Alert & oriented x 3, No focal deficits noted.   Psychiatric:  Affect normal, Judgment normal, Mood normal.      LAB:  All pertinent labs reviewed and interpreted.  Results for orders placed or performed during the hospital encounter of 12/25/22   CBC with platelets   Result Value Ref Range    WBC Count 5.0 4.0 - 11.0 10e3/uL    RBC Count 3.06 (L) 3.80 - 5.20 10e6/uL    Hemoglobin 9.5 (L) 11.7 - 15.7 g/dL    Hematocrit 28.5 (L) 35.0 - 47.0 %    MCV 93 78 - 100 fL    MCH 31.0 26.5 - 33.0 pg    MCHC 33.3 31.5 - 36.5 g/dL    RDW 13.9 10.0 - 15.0 %    Platelet Count 175 150 - 450 10e3/uL   Basic metabolic panel   Result Value Ref Range    Sodium 138 136 - 145 mmol/L    Potassium 4.0 3.5 - 5.0 mmol/L    Chloride 108 (H) 98 - 107 mmol/L    Carbon Dioxide (CO2) 22 22 - 31 mmol/L    Anion Gap 8 5 - 18 mmol/L    Urea Nitrogen 9 8 - 22 mg/dL    Creatinine 0.63 0.60 - 1.10 mg/dL    Calcium 9.1 8.5 - 10.5 mg/dL    Glucose 120 70 - 125 mg/dL    GFR Estimate >90 >60 mL/min/1.73m2       RADIOLOGY:  I have independently reviewed and interpreted the above imaging, pending the final radiology read.  US Pelvic Transabdominal and Transvaginal    (Results Pending)         I, Kaylyn Dallas, am serving as a scribe to document services personally performed by Dr. Talbert based on my observation and the provider's statements to me. I, Juan J Talbert, DO attest that Kaylyn Dallas is acting in a scribe capacity, has observed my performance of the services and has documented them in accordance with my direction.    Juan J Talbert, DO  Emergency Medicine  [facility] EMERGENCY ROOM  [address] [facility] [id number] [phone]  Dept: [phone]     Juan J Talbert MD  12/25/22 1937

## 2022-12-25 NOTE — ED NOTES
EMERGENCY DEPARTMENT SIGN OUT NOTE        ED COURSE AND MEDICAL DECISION MAKING  4:47 PM Patient was signed out to me by Dr Juan J Talbert.  7:02 PM I spoke to JEYSON Lutz.   8:51 PM I updated the patient.      In brief, Amada Melo is a 32 year old female who initially presented abdominal pain. Since the patient miscarried and underwent D&C procedure 1 week ago, she has been experiencing lower abdominal pain and pressure with low back pain, dizziness, and intermittent headaches. Denies vaginal bleeding. Patient claims she had a previous D&C and had a lot of vagina bleeding after but recovered in less than 2 days. Patient was seen by OB on 12/22 and US showed that the patient still had residual lining. She denies any other complaints at this time.     At time of sign out, disposition was pending US Pelvic Transabdominal and Transvaginal.     Followed up on patient after her ultrasound.  This did show expected appearance of endometrial lining after D&C.  Endometrium was mildly thickened at 18 mm.  She continued to have a fair amount of lower abdominal pain which would radiate to the bilateral back at times.  With an unrevealing ultrasound and labs, proceeded with CT abdomen pelvis which was also unremarkable with no other signs of any other acute inflammatory process or other postprocedural complication.  Patient received some Tylenol and additional IV Dilaudid for pain.  She also received some IV fluids after reporting she had some positional lightheadedness.  She was reevaluated and remained hemodynamically stable.  I did discuss her case briefly with the on-call OB/GYN Dr. Lo, and we discussed the ultrasound findings specifically.  Since she still is having some discomfort, subacute mild endometritis would still be a possibility although she does not have an elevated white blood cell count or fever so I am going to start her on doxycycline to make sure that this possibility is accounted for.  At a  "conclusion of her medical work-up, patient was reevaluated, she continues to be upset and frustrated by ongoing symptoms particularly abdominal pain, subsequent developed meant of a bitemporal headache, lightheadedness.  She also says she is very stressed by her \"narcissistic parents \"who are unable to help her with childcare, recent loss of food stamps, and other ongoing psychosocial stressors.   She denies any suicidal thoughts or intent.  At this point I do not have an easy means to address these concerns and I think they could certainly be exacerbating her other physical ailments.  I think her best course would be to continue to rest at home, continue Tylenol and ibuprofen, finish out a course of doxycycline and utilize oxycodone as needed for breakthrough pain.  She will follow-up with OB/GYN      I, Eliceo Durham, am serving as a scribe to document services personally performed by Rajesh Avendaño MD, based on my observations and the provider's statements to me.  I, Rajesh Avendaño MD, attest that Eliceo Durham is acting in a scribe capacity, has observed my performance of the services and has documented them in accordance with my direction.     FINAL IMPRESSION    1. Abdominal pain, generalized        ED MEDS  Medications   HYDROmorphone (DILAUDID) injection 1 mg (1 mg Intravenous Given 12/25/22 1353)   ondansetron (ZOFRAN) injection 4 mg (4 mg Intravenous Given 12/25/22 1349)   HYDROmorphone (DILAUDID) injection 1 mg (1 mg Intravenous Given 12/25/22 1514)   ketorolac (TORADOL) injection 15 mg (15 mg Intravenous Not Given 12/25/22 1819)   HYDROmorphone (PF) (DILAUDID) injection 0.5 mg (0.5 mg Intravenous Given 12/25/22 1803)   acetaminophen (TYLENOL) tablet 650 mg (650 mg Oral Given 12/25/22 1802)   iopamidol (ISOVUE-370) solution 100 mL (100 mLs Intravenous Given 12/25/22 1821)   oxyCODONE (ROXICODONE) tablet 5 mg (5 mg Oral Given 12/25/22 1939)   0.9% sodium chloride BOLUS (0 mLs Intravenous Stopped 12/25/22 " 2054)   ketorolac (TORADOL) injection 15 mg (15 mg Intravenous Given 12/25/22 2110)       LAB  Labs Ordered and Resulted from Time of ED Arrival to Time of ED Departure   CBC WITH PLATELETS - Abnormal       Result Value    WBC Count 5.0      RBC Count 3.06 (*)     Hemoglobin 9.5 (*)     Hematocrit 28.5 (*)     MCV 93      MCH 31.0      MCHC 33.3      RDW 13.9      Platelet Count 175     BASIC METABOLIC PANEL - Abnormal    Sodium 138      Potassium 4.0      Chloride 108 (*)     Carbon Dioxide (CO2) 22      Anion Gap 8      Urea Nitrogen 9      Creatinine 0.63      Calcium 9.1      Glucose 120      GFR Estimate >90     ROUTINE UA WITH MICROSCOPIC REFLEX TO CULTURE - Abnormal    Color Urine Light Yellow      Appearance Urine Clear      Glucose Urine Negative      Bilirubin Urine Negative      Ketones Urine Negative      Specific Gravity Urine 1.026      Blood Urine >1.0 mg/dL (*)     pH Urine 5.5      Protein Albumin Urine 10 (*)     Urobilinogen Urine <2.0      Nitrite Urine Negative      Leukocyte Esterase Urine Negative      Bacteria Urine Few (*)     Mucus Urine Present (*)     RBC Urine 129 (*)     WBC Urine 11 (*)     Squamous Epithelials Urine 6 (*)    URINE CULTURE         RADIOLOGY    CT Abdomen Pelvis w Contrast   Final Result   IMPRESSION:    1.  Heterogeneous uterus which is mildly enlarged. Please see the report of the ultrasound of the pelvis performed today.   2.  Mild hepatomegaly      US Pelvic Transabdominal and Transvaginal   Final Result   IMPRESSION:   1.  Expected appearance after recent miscarriage and D&C.   2.  Modest amount of fluid and debris present within the endometrial canal but no suggestion for retained products of conception.                   DISCHARGE MEDS  Discharge Medication List as of 12/25/2022  8:55 PM      START taking these medications    Details   doxycycline hyclate (VIBRAMYCIN) 100 MG capsule Take 1 capsule (100 mg) by mouth 2 times daily for 7 days, Disp-14 capsule, R-0,  Local Print             Rajesh Avendaño MD  [facility] EMERGENCY ROOM  [address] [facility] [id number] [phone]     Rajesh Avendaño MD  12/26/22 0043

## 2022-12-26 NOTE — DISCHARGE INSTRUCTIONS
You were seen in the emergency department for lower abdominal pain after miscarriage and D&C.  Your evaluation included an ultrasound as well as a CT scan and we reviewed everything with the on-call OB/GYN provider here tonight.  So far we have not seen any evidence of severe retained products of conception or any other serious inflammatory or infectious process based on your CT ultrasound and labs today.  We do think it would be worth trialing an antibiotic to see if there is any component of mild endometritis contributing to syptoms  We are going to prescribe you some oxycodone you can use for breakthrough severe pain.  We would like you to call your OB/GYN clinic tomorrow to set up follow-up after this ED visit.  Please continue on scheduled ibuprofen 600 mg every 6 hours and Tylenol 650 mg every 6 hours.  Make sure you are drinking plenty of liquids to stay hydrated.  Finally you can use oxycodone as needed for breakthrough severe discomfort but try to minimize this and primarily use it for nighttime pain relief.

## 2022-12-26 NOTE — ED NOTES
"Pt at discharge stated had sudden severe headache when stood, states became light headed and dizzy at that time. Provider made aware. Fluid ordered. VS checked on pt and pt states \"is this supposed to take care of my headache?\" Gave info could be viral. Pt states she feels she has had lots of viruses lately.   "

## 2022-12-26 NOTE — ED NOTES
Pt stated still feels headache and asked what would be done for her pain. Provider was made aware of continued pain. Provider addressed with pt. Pt asked for different nurse for medication and discharge.

## 2022-12-27 LAB — BACTERIA UR CULT: NO GROWTH

## 2022-12-30 ENCOUNTER — HOSPITAL ENCOUNTER (EMERGENCY)
Facility: CLINIC | Age: 32
Discharge: HOME | End: 2022-12-31
Attending: EMERGENCY MEDICINE | Admitting: EMERGENCY MEDICINE
Payer: COMMERCIAL

## 2022-12-30 VITALS
HEIGHT: 71 IN | HEART RATE: 110 BPM | DIASTOLIC BLOOD PRESSURE: 94 MMHG | RESPIRATION RATE: 20 BRPM | BODY MASS INDEX: 35 KG/M2 | SYSTOLIC BLOOD PRESSURE: 143 MMHG | TEMPERATURE: 97.7 F | OXYGEN SATURATION: 98 % | WEIGHT: 250 LBS

## 2022-12-30 DIAGNOSIS — R45.88 NONSUICIDAL SELF-INJURY (H): ICD-10-CM

## 2022-12-30 DIAGNOSIS — F41.9 ANXIETY: ICD-10-CM

## 2022-12-30 DIAGNOSIS — S51.812A LACERATION OF LEFT FOREARM, INITIAL ENCOUNTER: ICD-10-CM

## 2022-12-30 LAB
BASOPHILS # BLD AUTO: 0 10E3/UL (ref 0–0.2)
BASOPHILS NFR BLD AUTO: 0 %
EOSINOPHIL # BLD AUTO: 0.1 10E3/UL (ref 0–0.7)
EOSINOPHIL NFR BLD AUTO: 3 %
ERYTHROCYTE [DISTWIDTH] IN BLOOD BY AUTOMATED COUNT: 14.5 % (ref 10–15)
HCT VFR BLD AUTO: 29.7 % (ref 35–47)
HGB BLD-MCNC: 9.5 G/DL (ref 11.7–15.7)
IMM GRANULOCYTES # BLD: 0 10E3/UL
IMM GRANULOCYTES NFR BLD: 0 %
LYMPHOCYTES # BLD AUTO: 1.9 10E3/UL (ref 0.8–5.3)
LYMPHOCYTES NFR BLD AUTO: 39 %
MCH RBC QN AUTO: 30.8 PG (ref 26.5–33)
MCHC RBC AUTO-ENTMCNC: 32 G/DL (ref 31.5–36.5)
MCV RBC AUTO: 96 FL (ref 78–100)
MONOCYTES # BLD AUTO: 0.4 10E3/UL (ref 0–1.3)
MONOCYTES NFR BLD AUTO: 8 %
NEUTROPHILS # BLD AUTO: 2.4 10E3/UL (ref 1.6–8.3)
NEUTROPHILS NFR BLD AUTO: 50 %
NRBC # BLD AUTO: 0 10E3/UL
NRBC BLD AUTO-RTO: 0 /100
PLATELET # BLD AUTO: 192 10E3/UL (ref 150–450)
RBC # BLD AUTO: 3.08 10E6/UL (ref 3.8–5.2)
WBC # BLD AUTO: 4.8 10E3/UL (ref 4–11)

## 2022-12-30 PROCEDURE — 12002 RPR S/N/AX/GEN/TRNK2.6-7.5CM: CPT

## 2022-12-30 PROCEDURE — 82077 ASSAY SPEC XCP UR&BREATH IA: CPT | Performed by: EMERGENCY MEDICINE

## 2022-12-30 PROCEDURE — 80053 COMPREHEN METABOLIC PANEL: CPT | Performed by: EMERGENCY MEDICINE

## 2022-12-30 PROCEDURE — 250N000011 HC RX IP 250 OP 636: Performed by: EMERGENCY MEDICINE

## 2022-12-30 PROCEDURE — 84702 CHORIONIC GONADOTROPIN TEST: CPT | Performed by: EMERGENCY MEDICINE

## 2022-12-30 PROCEDURE — 36415 COLL VENOUS BLD VENIPUNCTURE: CPT | Performed by: EMERGENCY MEDICINE

## 2022-12-30 PROCEDURE — 80307 DRUG TEST PRSMV CHEM ANLYZR: CPT | Performed by: EMERGENCY MEDICINE

## 2022-12-30 PROCEDURE — 96361 HYDRATE IV INFUSION ADD-ON: CPT

## 2022-12-30 PROCEDURE — 96374 THER/PROPH/DIAG INJ IV PUSH: CPT | Mod: 59

## 2022-12-30 PROCEDURE — 258N000003 HC RX IP 258 OP 636: Performed by: EMERGENCY MEDICINE

## 2022-12-30 PROCEDURE — 96375 TX/PRO/DX INJ NEW DRUG ADDON: CPT

## 2022-12-30 PROCEDURE — 85014 HEMATOCRIT: CPT | Performed by: EMERGENCY MEDICINE

## 2022-12-30 PROCEDURE — 83690 ASSAY OF LIPASE: CPT | Performed by: EMERGENCY MEDICINE

## 2022-12-30 PROCEDURE — 81001 URINALYSIS AUTO W/SCOPE: CPT | Mod: XU | Performed by: EMERGENCY MEDICINE

## 2022-12-30 PROCEDURE — 99285 EMERGENCY DEPT VISIT HI MDM: CPT | Mod: 25

## 2022-12-30 RX ORDER — KETOROLAC TROMETHAMINE 15 MG/ML
15 INJECTION, SOLUTION INTRAMUSCULAR; INTRAVENOUS ONCE
Status: COMPLETED | OUTPATIENT
Start: 2022-12-30 | End: 2022-12-30

## 2022-12-30 RX ORDER — LORAZEPAM 2 MG/ML
1 INJECTION INTRAMUSCULAR ONCE
Status: COMPLETED | OUTPATIENT
Start: 2022-12-30 | End: 2022-12-30

## 2022-12-30 RX ADMIN — SODIUM CHLORIDE 1000 ML: 9 INJECTION, SOLUTION INTRAVENOUS at 23:42

## 2022-12-30 RX ADMIN — LORAZEPAM 1 MG: 2 INJECTION INTRAMUSCULAR; INTRAVENOUS at 23:41

## 2022-12-30 ASSESSMENT — ACTIVITIES OF DAILY LIVING (ADL): ADLS_ACUITY_SCORE: 33

## 2022-12-31 LAB
ALBUMIN SERPL-MCNC: 3.7 G/DL (ref 3.5–5)
ALBUMIN UR-MCNC: 20 MG/DL
ALP SERPL-CCNC: 85 U/L (ref 45–120)
ALT SERPL W P-5'-P-CCNC: 13 U/L (ref 0–45)
AMPHETAMINES UR QL SCN: ABNORMAL
ANION GAP SERPL CALCULATED.3IONS-SCNC: 11 MMOL/L (ref 5–18)
APPEARANCE UR: ABNORMAL
AST SERPL W P-5'-P-CCNC: 14 U/L (ref 0–40)
BARBITURATES UR QL: ABNORMAL
BENZODIAZ UR QL: ABNORMAL
BILIRUB SERPL-MCNC: <0.1 MG/DL (ref 0–1)
BILIRUB UR QL STRIP: NEGATIVE
BUN SERPL-MCNC: 7 MG/DL (ref 8–22)
CALCIUM SERPL-MCNC: 9.4 MG/DL (ref 8.5–10.5)
CANNABINOIDS UR QL SCN: ABNORMAL
CHLORIDE BLD-SCNC: 107 MMOL/L (ref 98–107)
CO2 SERPL-SCNC: 23 MMOL/L (ref 22–31)
COCAINE UR QL: ABNORMAL
COLOR UR AUTO: YELLOW
CREAT SERPL-MCNC: 0.64 MG/DL (ref 0.6–1.1)
CREAT UR-MCNC: 162 MG/DL
ETHANOL SERPL-MCNC: <10 MG/DL
GFR SERPL CREATININE-BSD FRML MDRD: >90 ML/MIN/1.73M2
GLUCOSE BLD-MCNC: 86 MG/DL (ref 70–125)
GLUCOSE UR STRIP-MCNC: NEGATIVE MG/DL
HCG SERPL-ACNC: 64 MLU/ML (ref 0–4)
HGB UR QL STRIP: ABNORMAL
KETONES UR STRIP-MCNC: NEGATIVE MG/DL
LEUKOCYTE ESTERASE UR QL STRIP: NEGATIVE
LIPASE SERPL-CCNC: 15 U/L (ref 0–52)
MUCOUS THREADS #/AREA URNS LPF: PRESENT /LPF
NITRATE UR QL: NEGATIVE
OPIATES UR QL SCN: ABNORMAL
OXYCODONE UR QL: ABNORMAL
PCP UR QL SCN: ABNORMAL
PH UR STRIP: 5.5 [PH] (ref 5–7)
POTASSIUM BLD-SCNC: 3.3 MMOL/L (ref 3.5–5)
PROT SERPL-MCNC: 6.6 G/DL (ref 6–8)
RBC URINE: 5 /HPF
SODIUM SERPL-SCNC: 141 MMOL/L (ref 136–145)
SP GR UR STRIP: 1.03 (ref 1–1.03)
SQUAMOUS EPITHELIAL: 7 /HPF
UROBILINOGEN UR STRIP-MCNC: <2 MG/DL
WBC URINE: 8 /HPF

## 2022-12-31 PROCEDURE — 96375 TX/PRO/DX INJ NEW DRUG ADDON: CPT

## 2022-12-31 PROCEDURE — 250N000009 HC RX 250: Performed by: EMERGENCY MEDICINE

## 2022-12-31 PROCEDURE — 250N000011 HC RX IP 250 OP 636: Performed by: EMERGENCY MEDICINE

## 2022-12-31 RX ORDER — HYDROXYZINE HYDROCHLORIDE 25 MG/1
50 TABLET, FILM COATED ORAL ONCE
Status: COMPLETED | OUTPATIENT
Start: 2022-12-31 | End: 2022-12-31

## 2022-12-31 RX ORDER — KETOROLAC TROMETHAMINE 15 MG/ML
15 INJECTION, SOLUTION INTRAMUSCULAR; INTRAVENOUS ONCE
Status: COMPLETED | OUTPATIENT
Start: 2022-12-31 | End: 2022-12-31

## 2022-12-31 RX ORDER — GINSENG 100 MG
CAPSULE ORAL ONCE
Status: COMPLETED | OUTPATIENT
Start: 2022-12-31 | End: 2022-12-31

## 2022-12-31 RX ORDER — HYDROXYZINE HYDROCHLORIDE 25 MG/1
50-100 TABLET, FILM COATED ORAL 3 TIMES DAILY PRN
Qty: 30 TABLET | Refills: 0 | Status: ON HOLD | OUTPATIENT
Start: 2022-12-31 | End: 2023-11-16

## 2022-12-31 RX ADMIN — KETOROLAC TROMETHAMINE 15 MG: 15 INJECTION, SOLUTION INTRAMUSCULAR; INTRAVENOUS at 00:42

## 2022-12-31 RX ADMIN — BACITRACIN: 500 OINTMENT TOPICAL at 00:43

## 2022-12-31 ASSESSMENT — ACTIVITIES OF DAILY LIVING (ADL): ADLS_ACUITY_SCORE: 35
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
## 2022-12-31 NOTE — ED TRIAGE NOTES
Pt c/o of self harm behaviors tonight, depression, and abdomen pain. Pt had a 2nd term miscarriage and emergent discharge on 12/18. She has since had increased abdomen pain. Due to life stressors and loss of her child the pt has had increased depression and has a hx of self harm. Pt cut herself tonight on her left forearm. Pt is tearful but cooperative she denies any SI.     Triage Assessment     Row Name 12/30/22 1795       Triage Assessment (Adult)    Airway WDL WDL       Respiratory WDL    Respiratory WDL WDL       Skin Circulation/Temperature WDL    Skin Circulation/Temperature WDL X  laceration to left forearm       Cardiac WDL    Cardiac WDL X;rhythm    Pulse Rate & Regularity tachycardic       Peripheral/Neurovascular WDL    Peripheral Neurovascular WDL WDL       Cognitive/Neuro/Behavioral WDL    Cognitive/Neuro/Behavioral WDL X;mood/behavior    Mood/Behavior agitated;anxious;sad;behavior appropriate to situation       [place] Coma Scale    Best Eye Response 4-->(E4) spontaneous    Best Motor Response 6-->(M6) obeys commands    Best Verbal Response 5-->(V5) oriented    [place] Coma Scale Score 15

## 2022-12-31 NOTE — ED NOTES
Pt reports no thoughts of killing herself, she stated that she has children and is living for them. She has no active plan of suicide. Noted a large incision cut from self harm on her left lower arm. Clarified with doctor Russo for concern for suicide, and both RN LEOLA and provider agreed pt is not a concern for suicide.

## 2022-12-31 NOTE — DISCHARGE INSTRUCTIONS
Wash your wound twice daily followed by antibiotic ointment like bacitracin, Neosporin or triple antibiotic ointment  Tylenol 650 mg every 4 hours as needed for pain  Ibuprofen 600 mg every 6 hours as needed for pain  Staples should be removed in 10 to 14 days  Follow-up with your counselor and GYN next week as previously arranged

## 2023-01-05 NOTE — DISCHARGE SUMMARY
"    HISTORY OF PRESENT ILLNESS AND HOSPITAL COURSE: This is a 32 year old, pregnancy complicated by known demise who presented to the ER with symptoms of incomplete miscarriage.  Patient declined D and C and received one dose of cytotec for medical management of pregnancy loss.  She than had spontaneous delivery of a nonviable premature fetus.  Shortly after, she had hemorrhage and a retained placenta and was taken to the OR for evacuation.  She then returned to her room to recover, bond with her infant and within 24 hours met criteria for sicharge.  LABS:  Lab Results   Component Value Date    HGB 9.5 (L) 12/30/2022       EXAM:  Blood pressure 122/66, pulse 93, temperature 98.1  F (36.7  C), temperature source Oral, resp. rate 18, height 1.803 m (5' 11\"), weight 113.4 kg (250 lb), SpO2 98 %, not currently breastfeeding.  General: NAD  Abdomen: Soft, non-tender  Uterine fundus: Firm, non-tender  Extremities: no pain, no edema    DISPOSITION:  Home    CONDITION AT DISCHARGE:  Good/Stable    DISCHARGE PLAN:   - Follow up with  MD, in 2-4 weeks, unless indicated sooner  - Take medication as prescribed  - Physical activity: As tolerated, no heavy lifting. Pelvic rest.  - Diet:  Regular  - Medication:  Please see MAR  - Warning signs discussed with patient about when to call the clinic/hospital  - All questions and concerns were answered for the patient prior to discharge.     Alicia Mota MD FACOG  [facility]  [phone]    "

## 2023-04-19 ENCOUNTER — LAB REQUISITION (OUTPATIENT)
Dept: LAB | Facility: CLINIC | Age: 33
End: 2023-04-19

## 2023-04-19 DIAGNOSIS — Z32.00 ENCOUNTER FOR PREGNANCY TEST, RESULT UNKNOWN: ICD-10-CM

## 2023-04-19 LAB — HCG INTACT+B SERPL-ACNC: 2036 MIU/ML

## 2023-04-19 PROCEDURE — 84702 CHORIONIC GONADOTROPIN TEST: CPT | Performed by: OBSTETRICS & GYNECOLOGY

## 2023-04-20 ENCOUNTER — MEDICAL CORRESPONDENCE (OUTPATIENT)
Dept: HEALTH INFORMATION MANAGEMENT | Facility: CLINIC | Age: 33
End: 2023-04-20
Payer: COMMERCIAL

## 2023-04-21 ENCOUNTER — LAB REQUISITION (OUTPATIENT)
Dept: LAB | Facility: CLINIC | Age: 33
End: 2023-04-21

## 2023-04-21 DIAGNOSIS — Z33.1 PREGNANT STATE, INCIDENTAL: ICD-10-CM

## 2023-04-21 LAB — HCG INTACT+B SERPL-ACNC: 4176 MIU/ML

## 2023-04-21 PROCEDURE — 84702 CHORIONIC GONADOTROPIN TEST: CPT | Performed by: OBSTETRICS & GYNECOLOGY

## 2023-04-25 ENCOUNTER — TRANSFERRED RECORDS (OUTPATIENT)
Dept: HEALTH INFORMATION MANAGEMENT | Facility: CLINIC | Age: 33
End: 2023-04-25
Payer: COMMERCIAL

## 2023-04-25 ENCOUNTER — LAB REQUISITION (OUTPATIENT)
Dept: LAB | Facility: CLINIC | Age: 33
End: 2023-04-25

## 2023-04-25 DIAGNOSIS — Z33.1 PREGNANT STATE, INCIDENTAL: ICD-10-CM

## 2023-04-25 PROCEDURE — 84702 CHORIONIC GONADOTROPIN TEST: CPT | Performed by: NURSE PRACTITIONER

## 2023-04-26 LAB — HCG INTACT+B SERPL-ACNC: ABNORMAL MIU/ML

## 2023-05-01 ENCOUNTER — TRANSFERRED RECORDS (OUTPATIENT)
Dept: MULTI SPECIALTY CLINIC | Facility: CLINIC | Age: 33
End: 2023-05-01

## 2023-05-01 LAB — PAP SMEAR - HIM PATIENT REPORTED: NORMAL

## 2023-05-02 ENCOUNTER — HOSPITAL ENCOUNTER (EMERGENCY)
Facility: HOSPITAL | Age: 33
Discharge: HOME | End: 2023-05-02
Attending: STUDENT IN AN ORGANIZED HEALTH CARE EDUCATION/TRAINING PROGRAM | Admitting: STUDENT IN AN ORGANIZED HEALTH CARE EDUCATION/TRAINING PROGRAM
Payer: COMMERCIAL

## 2023-05-02 ENCOUNTER — APPOINTMENT (OUTPATIENT)
Dept: ULTRASOUND IMAGING | Facility: HOSPITAL | Age: 33
End: 2023-05-02
Attending: STUDENT IN AN ORGANIZED HEALTH CARE EDUCATION/TRAINING PROGRAM
Payer: COMMERCIAL

## 2023-05-02 ENCOUNTER — TRANSFERRED RECORDS (OUTPATIENT)
Dept: HEALTH INFORMATION MANAGEMENT | Facility: CLINIC | Age: 33
End: 2023-05-02

## 2023-05-02 VITALS
RESPIRATION RATE: 18 BRPM | HEART RATE: 82 BPM | HEIGHT: 71 IN | TEMPERATURE: 97.5 F | OXYGEN SATURATION: 100 % | WEIGHT: 148 LBS | DIASTOLIC BLOOD PRESSURE: 73 MMHG | SYSTOLIC BLOOD PRESSURE: 139 MMHG | BODY MASS INDEX: 20.72 KG/M2

## 2023-05-02 DIAGNOSIS — M79.661 RIGHT CALF PAIN: ICD-10-CM

## 2023-05-02 PROCEDURE — 93971 EXTREMITY STUDY: CPT | Mod: RT

## 2023-05-02 PROCEDURE — 99284 EMERGENCY DEPT VISIT MOD MDM: CPT | Mod: 25

## 2023-05-02 ASSESSMENT — ACTIVITIES OF DAILY LIVING (ADL): ADLS_ACUITY_SCORE: 35

## 2023-05-02 NOTE — ED TRIAGE NOTES
Patient comes in with right leg pain, pain is in right calf and upper thigh. Patient has history of blood clot in lower extremities during her last pregnancy. No over the counter medications for pain.

## 2023-05-02 NOTE — DISCHARGE INSTRUCTIONS
Your ultrasound was negative.  Please follow-up with your primary care doctor in the next week or so if you continue to have pain.  You may need a repeat ultrasound the next couple weeks.

## 2023-05-02 NOTE — ED PROVIDER NOTES
Emergency Department Encounter         FINAL IMPRESSION:  Calf pain        ED COURSE AND MEDICAL DECISION MAKING       ED Course as of 05/02/23 1335   Tue May 02, 2023   1325 32-year-old with a history of superficial thrombophlebitis during her last pregnancy, now currently 7 weeks pregnant, coming from her OB appointment today after a well-appearing fetus intrauterine, here with 20 minutes of right posterior calf pain.  No chest pain or trouble breathing.  No abdominal pain.  No vaginal bleeding or discharge.  On arrival she looks well.  The right leg looks normal compared to left.  Heart and lungs normal.  Plan for ultrasound reevaluate     Ultrasound negative.        Medical Decision Making    History:    Supplemental history from: Documented in chart, if applicable    External Record(s) reviewed: Documented in chart, if applicable.    Work Up:    Chart documentation includes differential considered and any EKGs or imaging independently interpreted by provider, where specified.    In additional to work up documented, I considered the following work up: Documented in chart, if applicable.    External consultation:    Discussion of management with another provider: Documented in chart, if applicable    Complicating factors:    Care impacted by chronic illness: N/A    Care affected by social determinants of health: N/A    Disposition considerations: Discharge. No recommendations on prescription strength medication(s). See documentation for any additional details.            Critical Care     Performed by: Kenrick Batista or    Authorized by: Kenrick Batista  Total critical care time:  minutes  Critical care was necessary to treat or prevent imminent or life-threatening deterioration of the following conditions:   Critical care was time spent personally by me on the following activities: development of treatment plan with patient or surrogate, discussions with consultants, examination of patient, evaluation of patient's response  to treatment, obtaining history from patient or surrogate, ordering and performing treatments and interventions, ordering and review of laboratory studies, ordering and review of radiographic studies, re-evaluation of patient's condition and monitoring for potential decompensation.  Critical care time was exclusive of separately billable procedures and treating other patients.'    At the conclusion of the encounter I discussed the results of all the tests and the disposition. The questions were answered. The patient or family acknowledged understanding and was agreeable with the care plan.                  MEDICATIONS GIVEN IN THE EMERGENCY DEPARTMENT:  Medications - No data to display    NEW PRESCRIPTIONS STARTED AT TODAY'S ED VISIT:  New Prescriptions    No medications on file       HPI     Patient information obtained from: Patient    Use of Interpretor: N/A    Amada Melo is a 32 year old female with a pertinent history of superficial thrombophlebitis during pregnancy during first trimester, MIGUEL, PTSD, and miscarriage who presents to this ED via private car for evaluation of right leg pain.    The patient reports right leg, calf, and back of thigh pain for the past 30 minutes. She states that in her previous pregnancy she was diagnosed with superficial thrombosis in the first trimester, following a long car ride to [place]. Today she states she is 7 weeks pregnant, and was in the car for 2 hours. Earlier today she had gone to the clinic where they diagnosed her with BV, and they took an ultrasound with normal findings.She denies any vaginal bleeding or discharge. She further denies any shortness of breath, chest pain, or abdominal pain.     Patient states in her previous case of superficial thrombosis she was given a Lovenox injection which alleviated symptoms.       REVIEW OF SYSTEMS:  Review of Systems   Constitutional: Negative for fever, malaise  HEENT: Negative runny nose, sore throat, ear pain, neck  "pain  Respiratory: Negative for shortness of breath, cough, congestion  Cardiovascular: Negative for chest pain, leg edema  Gastrointestinal: Negative for abdominal distention, abdominal pain, constipation, vomiting, nausea, diarrhea  Genitourinary: Negative for dysuria and hematuria.   Integument: Negative for rash, skin breakdown  Neurological: Negative for paresthesias, weakness, headache.  Musculoskeletal: Negative for joint pain, joint swelling Positive for right leg, calf, and back of thigh pain.       All other systems reviewed and are negative.          MEDICAL HISTORY     Past Medical History:   Diagnosis Date     ADD (attention deficit disorder)      Genital herpes      PTSD (post-traumatic stress disorder)      Uncomplicated asthma        Past Surgical History:   Procedure Laterality Date     DILATION AND CURETTAGE SUCTION N/A 2022    Procedure: DILATION AND CURETTAGE, UTERUS, USING SUCTION;  Surgeon: Alicia Mota MD;  Location: [facility] OR     ENT SURGERY       PELVIC EXAMINATION UNDER ANESTHESIA N/A 2022    Procedure: EXAM UNDER ANESTHESIA, GYNECOLOGIC;  Surgeon: Alicia Mota MD;  Location: [facility] OR     TONSILLECTOMY       WISDOM TOOTH EXTRACTION         Social History     Tobacco Use     Smoking status: Former     Packs/day: 0.50     Types: Cigarettes     Quit date: 2018     Years since quittin.6     Smokeless tobacco: Never     Tobacco comments:     5 cigs per day---also does e-cig   Substance Use Topics     Alcohol use: Yes     Alcohol/week: 0.0 standard drinks of alcohol     Comment: Alcoholic Drinks/day: \"sometimes\" - not in pregnancy     Drug use: No       albuterol (PROAIR HFA, PROVENTIL HFA, VENTOLIN HFA) 108 (90 BASE) MCG/ACT inhaler  ALPRAZolam (XANAX) 1 MG tablet  hydrOXYzine (ATARAX) 25 MG tablet  PARoxetine (PAXIL) 40 MG tablet  Prenatal Vit-Fe Fumarate-FA (PRENATAL MULTIVITAMIN W/IRON) 27-0.8 MG tablet            PHYSICAL EXAM " "    /73   Pulse 82   Temp 97.5  F (36.4  C) (Oral)   Resp 18   Ht 1.803 m (5' 11\")   Wt 67.1 kg (148 lb)   SpO2 100%   BMI 20.64 kg/m        PHYSICAL EXAM:     General: Patient appears well, nontoxic, comfortable  HEENT: Moist mucous membranes,  No head trauma.    Cardiovascular: Normal rate, normal rhythm, no extremity edema.  No appreciable murmur.  Respiratory: No signs of respiratory distress, lungs are clear to auscultation bilaterally with no wheezes rhonchi or rales.  Abdominal: Soft, nontender, nondistended, no palpable masses, no guarding, no rebound  Musculoskeletal: Full range of motion of joints, no deformities appreciated.  Right calf appears normal compared to left.  Normal pulses.  Soft compartments.  No signs of superficial thrombophlebitis no redness  Neurological: Alert and oriented, grossly neurologically intact.  Psychological: Normal affect and mood.  Integument: No rashes appreciated        RESULTS       Labs Ordered and Resulted from Time of ED Arrival to Time of ED Departure - No data to display    US Lower Extremity Venous Duplex Right    (Results Pending)                     PROCEDURES:  Procedures:  Procedures       I, Tarik Khan am serving as a scribe to document services personally performed by Kenrick Batista DO, based on my observations and the provider's statements to me.  I, Kenrick Batista DO, attest that Tarik Khan is acting in a scribe capacity, has observed my performance of the services and has documented them in accordance with my direction.    Kenrick Batista DO  Emergency Medicine  [facility] EMERGENCY DEPARTMENT     Kenrick Batista DO  05/02/23 1511    "

## 2023-05-03 ENCOUNTER — TRANSFERRED RECORDS (OUTPATIENT)
Dept: HEALTH INFORMATION MANAGEMENT | Facility: CLINIC | Age: 33
End: 2023-05-03
Payer: COMMERCIAL

## 2023-05-03 ENCOUNTER — TRANSCRIBE ORDERS (OUTPATIENT)
Dept: MATERNAL FETAL MEDICINE | Facility: HOSPITAL | Age: 33
End: 2023-05-03
Payer: COMMERCIAL

## 2023-05-03 DIAGNOSIS — O26.90 PREGNANCY RELATED CONDITION, ANTEPARTUM: Primary | ICD-10-CM

## 2023-05-04 ENCOUNTER — TELEPHONE (OUTPATIENT)
Dept: MATERNAL FETAL MEDICINE | Facility: CLINIC | Age: 33
End: 2023-05-04
Payer: COMMERCIAL

## 2023-05-04 DIAGNOSIS — O26.90 PREGNANCY RELATED CONDITION, ANTEPARTUM: Primary | ICD-10-CM

## 2023-05-04 NOTE — TELEPHONE ENCOUNTER
Phone call to Amada regarding referral due to pregnancy exposure to methotrexate. Clarified with patient, it was her partner who was taking methotrexate for psoriasis when she got pregnant. Will review information with KONG and CABRERA ROLLINS.    Destinee Coulter RN

## 2023-05-19 ENCOUNTER — HOSPITAL ENCOUNTER (EMERGENCY)
Facility: HOSPITAL | Age: 33
Discharge: HOME | End: 2023-05-19
Attending: EMERGENCY MEDICINE | Admitting: EMERGENCY MEDICINE
Payer: COMMERCIAL

## 2023-05-19 VITALS
DIASTOLIC BLOOD PRESSURE: 60 MMHG | WEIGHT: 255.29 LBS | SYSTOLIC BLOOD PRESSURE: 112 MMHG | OXYGEN SATURATION: 100 % | BODY MASS INDEX: 35.61 KG/M2 | TEMPERATURE: 98.2 F | HEART RATE: 75 BPM | RESPIRATION RATE: 16 BRPM

## 2023-05-19 DIAGNOSIS — H65.02 ACUTE SEROUS OTITIS MEDIA OF LEFT EAR, RECURRENCE NOT SPECIFIED: ICD-10-CM

## 2023-05-19 DIAGNOSIS — S39.012A LOW BACK STRAIN, INITIAL ENCOUNTER: ICD-10-CM

## 2023-05-19 LAB
ALBUMIN UR-MCNC: NEGATIVE MG/DL
APPEARANCE UR: CLEAR
BACTERIA #/AREA URNS HPF: ABNORMAL /HPF
BILIRUB UR QL STRIP: NEGATIVE
COLOR UR AUTO: ABNORMAL
GLUCOSE UR STRIP-MCNC: NEGATIVE MG/DL
HGB UR QL STRIP: NEGATIVE
KETONES UR STRIP-MCNC: NEGATIVE MG/DL
LEUKOCYTE ESTERASE UR QL STRIP: NEGATIVE
MUCOUS THREADS #/AREA URNS LPF: PRESENT /LPF
NITRATE UR QL: NEGATIVE
PH UR STRIP: 5.5 [PH] (ref 5–7)
RBC URINE: 1 /HPF
SP GR UR STRIP: 1.03 (ref 1–1.03)
SQUAMOUS EPITHELIAL: 7 /HPF
UROBILINOGEN UR STRIP-MCNC: <2 MG/DL
WBC URINE: 2 /HPF

## 2023-05-19 PROCEDURE — 99284 EMERGENCY DEPT VISIT MOD MDM: CPT

## 2023-05-19 PROCEDURE — 81003 URINALYSIS AUTO W/O SCOPE: CPT | Performed by: EMERGENCY MEDICINE

## 2023-05-19 RX ORDER — FEXOFENADINE HCL 60 MG/1
60 TABLET, FILM COATED ORAL 2 TIMES DAILY
Qty: 30 TABLET | Refills: 0 | Status: ON HOLD | OUTPATIENT
Start: 2023-05-19 | End: 2023-11-16

## 2023-05-19 RX ORDER — CEFDINIR 300 MG/1
300 CAPSULE ORAL 2 TIMES DAILY
Qty: 14 CAPSULE | Refills: 0 | Status: ON HOLD | OUTPATIENT
Start: 2023-05-19 | End: 2023-11-16

## 2023-05-19 ASSESSMENT — ENCOUNTER SYMPTOMS
SHORTNESS OF BREATH: 0
FEVER: 1
CHILLS: 0
BACK PAIN: 1
ABDOMINAL PAIN: 1
FREQUENCY: 1
COUGH: 1

## 2023-05-19 ASSESSMENT — ACTIVITIES OF DAILY LIVING (ADL): ADLS_ACUITY_SCORE: 33

## 2023-05-19 NOTE — ED NOTES
Pt complains of lower back pain and left ear pain. She states she is about 9 weeks pregnant. 6 previous pregnancies- 2 living children. Pt states taking antibiotics for BV recently. Lower back pain started a couple weeks about and ear pain started 2-3 days ago.

## 2023-05-19 NOTE — ED PROVIDER NOTES
EMERGENCY DEPARTMENT ENCOUNTER      NAME: Amada Melo  AGE: 32 year old female  YOB: 1990  MRN: [id number]  EVALUATION DATE & TIME: 2023  4:56 PM    PCP: Gretchen Orozco    ED PROVIDER: Les Santa MD      Chief Complaint   Patient presents with     Back Pain     Otalgia         FINAL IMPRESSION:  Left serous otitis  Low back strain    ED COURSE & MEDICAL DECISION MAKIN:01 PM I met with the patient, obtained history, performed an initial exam, and discussed options and plan for diagnostics and treatment here in the ED.    5:48 PM.  Urine analysis unremarkable no evidence of infection.  We will start antibiotics and antihistamines for right serous otitis.  Tylenol recommended for continued low back pain.  Follow-up with primary care physician as needed  Pertinent Labs & Imaging studies reviewed. (See chart for details)  32 year old female presents to the Emergency Department for evaluation of low back pain and muffled hearing out of left ear.  Hearing issues been ongoing for last 3 days.  Low back pain ongoing for a number of days.  Does worsen slightly with movement.  No obvious strains or injuries.  Patient is approximately 10 weeks pregnant.  Review of records indicate previous ultrasound demonstrating IUP.  On exam she is an obese female mild distress.  She has some slight fullness to the left TM without injection suggestive of serous otitis.  Lungs clear.  No CVA tenderness.  Obese abdomen.  Slight tenderness along the lower left paralumbar muscles suggestive of simple musculoskeletal discomfort.  Patient does report fevers over the last few nights intermittently.  States has been up to 100.3.  However patient without evidence of fevers presently.  Vital signs otherwise unremarkable.  Patient was treated recently for bacterial vaginosis.  Given absence of fevers.  No CVA tenderness unlikely represent pyelonephritis, or significant pathology.  No indications for blood work.   Will obtain urine analysis given her pregnancy.    At the conclusion of the encounter I discussed the results of all of the tests and the disposition. The questions were answered. The patient or family acknowledged understanding and was agreeable with the care plan.       Medical Decision Making    History:    Supplemental history from: Documented in chart, if applicable    External Record(s) reviewed: Documented in chart, if applicable.    Work Up:    Chart documentation includes differential considered and any EKGs or imaging interpreted by provider.    In additional to work up documented, I considered the following work up: Documented in chart, if applicable.    External consultation:    Discussion of management with another provider: Documented in chart, if applicable    Complicating factors:    Care impacted by chronic illness: Mental Health multiple pregnancies    Care affected by social determinants of health: N/A    Disposition considerations: Discharge. I prescribed additional prescription strength medication(s) as charted. See documentation for any additional details.       MEDICATIONS GIVEN IN THE EMERGENCY:  Medications - No data to display    NEW PRESCRIPTIONS STARTED AT TODAY'S ER VISIT  Current Discharge Medication List      START taking these medications    Details   cefdinir (OMNICEF) 300 MG capsule Take 1 capsule (300 mg) by mouth 2 times daily  Qty: 14 capsule, Refills: 0      fexofenadine (ALLEGRA) 60 MG tablet Take 1 tablet (60 mg) by mouth 2 times daily  Qty: 30 tablet, Refills: 0                =================================================================    HPI    Patient information was obtained from: Patient     Use of : N/A         Amada Melo is a 32 year old female with a pertinent history of anxiety, scoliosis, genital herpes, MIGUEL, asthma, who presents to this ED via walk in for evaluation of back pain and otalgia.     The patient started to have difficulty hearing  out of her left ear a couple days ago. She has left ear pressure. She endorses have right lower back pain that she describe as a dull sensation. The back pain is worse at night. Denies any radiation to lower extremities. She took tylenol with no relief. She reports having a low grade fever of  F. The patient has urination frequency and a dry cough. She is about 9 weeks pregnant, . She has had mild abdominal cramping. Denies any chills, chest pain, shortness of breath, or other concerns.        REVIEW OF SYSTEMS   Review of Systems   Constitutional: Positive for fever (low grade ). Negative for chills.   Respiratory: Positive for cough (dry). Negative for shortness of breath.    Cardiovascular: Negative for chest pain.   Gastrointestinal: Positive for abdominal pain (mild cramping).   Genitourinary: Positive for frequency.   Musculoskeletal: Positive for back pain (right lower).   All other systems reviewed and are negative.       PAST MEDICAL HISTORY:  Past Medical History:   Diagnosis Date     ADD (attention deficit disorder)      Genital herpes      PTSD (post-traumatic stress disorder)      Uncomplicated asthma        PAST SURGICAL HISTORY:  Past Surgical History:   Procedure Laterality Date     DILATION AND CURETTAGE SUCTION N/A 2022    Procedure: DILATION AND CURETTAGE, UTERUS, USING SUCTION;  Surgeon: Alicia Mota MD;  Location: [facility] OR     ENT SURGERY       PELVIC EXAMINATION UNDER ANESTHESIA N/A 2022    Procedure: EXAM UNDER ANESTHESIA, GYNECOLOGIC;  Surgeon: Alicia Mota MD;  Location: [facility] OR     TONSILLECTOMY       WISDOM TOOTH EXTRACTION             CURRENT MEDICATIONS:    albuterol (PROAIR HFA, PROVENTIL HFA, VENTOLIN HFA) 108 (90 BASE) MCG/ACT inhaler  ALPRAZolam (XANAX) 1 MG tablet  hydrOXYzine (ATARAX) 25 MG tablet  PARoxetine (PAXIL) 40 MG tablet  Prenatal Vit-Fe Fumarate-FA (PRENATAL MULTIVITAMIN W/IRON) 27-0.8 MG  "tablet        ALLERGIES:  Allergies   Allergen Reactions     Amoxicillin-Pot Clavulanate Hives     Adhesive Tape      Other reaction(s): Contact Dermatitis  blisters  Chemical burn like reaction to steri strips       Nickel      Skin irritation      Penicillins Hives     Steri Strips      blister       FAMILY HISTORY:  Family History   Problem Relation Age of Onset     Depression Mother      Depression Father      Asthma Mother      Cervical Cancer Mother      Cerebrovascular Disease Maternal Grandmother      Hypercalcemia Paternal Grandmother      Hypertension Paternal Grandmother        SOCIAL HISTORY:   Social History     Socioeconomic History     Marital status: Legally      Spouse name: seperated for 4 years     Number of children: 1     Years of education: None     Highest education level: None   Occupational History     Occupation: unemployed     Employer: [employer]   Tobacco Use     Smoking status: Former     Packs/day: 0.50     Types: Cigarettes     Quit date: 2018     Years since quittin.7     Smokeless tobacco: Never     Tobacco comments:     5 cigs per day---also does e-cig   Substance and Sexual Activity     Alcohol use: Yes     Alcohol/week: 0.0 standard drinks of alcohol     Comment: Alcoholic Drinks/day: \"sometimes\" - not in pregnancy     Drug use: No     Sexual activity: Not Currently       VITALS:  /68   Pulse 78   Temp 98.2  F (36.8  C) (Oral)   Resp 16   Wt 115.8 kg (255 lb 4.7 oz)   SpO2 100%   BMI 35.61 kg/m      PHYSICAL EXAM    Constitutional: Well developed, Well nourished, NAD. Mild distress.   HENT: Normocephalic, Atraumatic, Bilateral external ears normal, Oropharynx normal, mucous membranes moist, Nose normal. Neck - Normal range of motion, No tenderness, Supple, No stridor.    Eyes: PERRL, EOMI, Conjunctiva normal, No discharge.   Respiratory: Normal breath sounds, No respiratory distress, No wheezing, Speaks full sentences easily. Dry cough.  "   Cardiovascular: Normal heart rate, Regular rhythm, No murmurs, No rubs, No gallops. Chest wall nontender.   Abdominal: Obese, Soft, nontender, nondistended, no palpable masses, no guarding, no rebound. No CVA tenderness.   GI: No excessive obesity. Bowel sounds normal, Soft, No tenderness, No masses, No flank tenderness. No rebound or guarding.   Musculoskeletal: Slight tenderness to right paraspinal at the S5 S1.  No edema.  No cyanosis, No clubbing. Good range of motion in all major joints. No tenderness to palpation or major deformities noted. No tenderness of the CTLS spine.    Integument: Warm, Dry, No erythema, No rash. No petechiae.  tattoos.   Neurologic: Normal motor function, Normal sensory function, No focal deficits noted. Normal gait.    Psychiatric: Affect normal, Judgment normal, Mood normal. Cooperative.      LAB:  All pertinent labs reviewed and interpreted.     Results for orders placed or performed during the hospital encounter of 05/19/23   UA with Microscopic reflex to Culture     Status: Abnormal    Specimen: Urine, Midstream   Result Value Ref Range    Color Urine Light Yellow Colorless, Straw, Light Yellow, Yellow    Appearance Urine Clear Clear    Glucose Urine Negative Negative mg/dL    Bilirubin Urine Negative Negative    Ketones Urine Negative Negative mg/dL    Specific Gravity Urine 1.027 1.001 - 1.030    Blood Urine Negative Negative    pH Urine 5.5 5.0 - 7.0    Protein Albumin Urine Negative Negative mg/dL    Urobilinogen Urine <2.0 <2.0 mg/dL    Nitrite Urine Negative Negative    Leukocyte Esterase Urine Negative Negative    Bacteria Urine Few (A) None Seen /HPF    Mucus Urine Present (A) None Seen /LPF    RBC Urine 1 <=2 /HPF    WBC Urine 2 <=5 /HPF    Squamous Epithelials Urine 7 (H) <=1 /HPF    Narrative    Urine Culture not indicated              I, Althea Her, am serving as a scribe to document services personally performed by Les Santa MD, based on my observation and the  provider's statements to me. I, Les Santa MD, attest that Althea Her is acting in a scribe capacity, has observed my performance of the services and has documented them in accordance with my direction.    Les Santa MD  [facility] EMERGENCY DEPARTMENT  [address] [place] [id number]  [phone]       Les Santa MD  05/19/23 9075

## 2023-05-19 NOTE — ED TRIAGE NOTES
"Pt started feeling like she couldn't hear out of her left ear with \"fullness\" that started 2-3 days ago. Pt is also having low left sided back pain.  Pt is 9 weeks pregnant.      Triage Assessment     Row Name 05/19/23 3619       Triage Assessment (Adult)    Airway WDL WDL       Respiratory WDL    Respiratory WDL WDL       Skin Circulation/Temperature WDL    Skin Circulation/Temperature WDL WDL       Cardiac WDL    Cardiac WDL WDL       Peripheral/Neurovascular WDL    Peripheral Neurovascular WDL WDL       Cognitive/Neuro/Behavioral WDL    Cognitive/Neuro/Behavioral WDL WDL              "

## 2023-05-23 ENCOUNTER — LAB REQUISITION (OUTPATIENT)
Dept: LAB | Facility: CLINIC | Age: 33
End: 2023-05-23

## 2023-05-23 DIAGNOSIS — O09.529 SUPERVISION OF ELDERLY MULTIGRAVIDA, UNSPECIFIED TRIMESTER: ICD-10-CM

## 2023-05-23 LAB
BASOPHILS # BLD AUTO: 0 10E3/UL (ref 0–0.2)
BASOPHILS NFR BLD AUTO: 0 %
EOSINOPHIL # BLD AUTO: 0.1 10E3/UL (ref 0–0.7)
EOSINOPHIL NFR BLD AUTO: 1 %
ERYTHROCYTE [DISTWIDTH] IN BLOOD BY AUTOMATED COUNT: 13.7 % (ref 10–15)
HBA1C MFR BLD: 5.4 %
HCT VFR BLD AUTO: 33.8 % (ref 35–47)
HGB BLD-MCNC: 10.8 G/DL (ref 11.7–15.7)
IMM GRANULOCYTES # BLD: 0 10E3/UL
IMM GRANULOCYTES NFR BLD: 0 %
LYMPHOCYTES # BLD AUTO: 1.1 10E3/UL (ref 0.8–5.3)
LYMPHOCYTES NFR BLD AUTO: 20 %
MCH RBC QN AUTO: 29.3 PG (ref 26.5–33)
MCHC RBC AUTO-ENTMCNC: 32 G/DL (ref 31.5–36.5)
MCV RBC AUTO: 92 FL (ref 78–100)
MONOCYTES # BLD AUTO: 0.5 10E3/UL (ref 0–1.3)
MONOCYTES NFR BLD AUTO: 9 %
NEUTROPHILS # BLD AUTO: 3.9 10E3/UL (ref 1.6–8.3)
NEUTROPHILS NFR BLD AUTO: 70 %
NRBC # BLD AUTO: 0 10E3/UL
NRBC BLD AUTO-RTO: 0 /100
PLAT MORPH BLD: NORMAL
PLATELET # BLD AUTO: 171 10E3/UL (ref 150–450)
RBC # BLD AUTO: 3.69 10E6/UL (ref 3.8–5.2)
RBC MORPH BLD: NORMAL
WBC # BLD AUTO: 5.6 10E3/UL (ref 4–11)

## 2023-05-23 PROCEDURE — 86803 HEPATITIS C AB TEST: CPT | Performed by: NURSE PRACTITIONER

## 2023-05-23 PROCEDURE — 80081 OBSTETRIC PANEL INC HIV TSTG: CPT | Performed by: NURSE PRACTITIONER

## 2023-05-23 PROCEDURE — 87340 HEPATITIS B SURFACE AG IA: CPT | Performed by: NURSE PRACTITIONER

## 2023-05-23 PROCEDURE — 86592 SYPHILIS TEST NON-TREP QUAL: CPT | Performed by: NURSE PRACTITIONER

## 2023-05-23 PROCEDURE — 83036 HEMOGLOBIN GLYCOSYLATED A1C: CPT | Performed by: NURSE PRACTITIONER

## 2023-05-23 PROCEDURE — 87086 URINE CULTURE/COLONY COUNT: CPT | Performed by: NURSE PRACTITIONER

## 2023-05-23 PROCEDURE — 86850 RBC ANTIBODY SCREEN: CPT | Performed by: NURSE PRACTITIONER

## 2023-05-23 PROCEDURE — 87389 HIV-1 AG W/HIV-1&-2 AB AG IA: CPT | Performed by: NURSE PRACTITIONER

## 2023-05-24 LAB
ABO/RH(D): NORMAL
ANTIBODY SCREEN: NEGATIVE
HBV SURFACE AG SERPL QL IA: NONREACTIVE
HCV AB SERPL QL IA: NONREACTIVE
HIV 1+2 AB+HIV1 P24 AG SERPL QL IA: NONREACTIVE
RPR SER QL: NONREACTIVE
RUBV IGG SERPL QL IA: 1.98 INDEX
RUBV IGG SERPL QL IA: POSITIVE
SPECIMEN EXPIRATION DATE: NORMAL

## 2023-05-25 LAB — BACTERIA UR CULT: NORMAL

## 2023-05-30 ENCOUNTER — HOSPITAL ENCOUNTER (EMERGENCY)
Facility: HOSPITAL | Age: 33
Discharge: HOME | End: 2023-05-30
Attending: EMERGENCY MEDICINE | Admitting: EMERGENCY MEDICINE
Payer: COMMERCIAL

## 2023-05-30 VITALS
OXYGEN SATURATION: 100 % | HEIGHT: 71 IN | RESPIRATION RATE: 16 BRPM | BODY MASS INDEX: 35.56 KG/M2 | TEMPERATURE: 97.7 F | SYSTOLIC BLOOD PRESSURE: 141 MMHG | HEART RATE: 74 BPM | DIASTOLIC BLOOD PRESSURE: 70 MMHG | WEIGHT: 253.97 LBS

## 2023-05-30 DIAGNOSIS — O21.0 MORNING SICKNESS: ICD-10-CM

## 2023-05-30 LAB
ALBUMIN SERPL BCG-MCNC: 3.9 G/DL (ref 3.5–5.2)
ALP SERPL-CCNC: 62 U/L (ref 35–104)
ALT SERPL W P-5'-P-CCNC: 10 U/L (ref 10–35)
ANION GAP SERPL CALCULATED.3IONS-SCNC: 11 MMOL/L (ref 7–15)
APTT PPP: 29 SECONDS (ref 22–38)
AST SERPL W P-5'-P-CCNC: 17 U/L (ref 10–35)
BASOPHILS # BLD AUTO: 0 10E3/UL (ref 0–0.2)
BASOPHILS NFR BLD AUTO: 1 %
BILIRUB SERPL-MCNC: <0.2 MG/DL
BUN SERPL-MCNC: 8.2 MG/DL (ref 6–20)
CALCIUM SERPL-MCNC: 9.4 MG/DL (ref 8.6–10)
CHLORIDE SERPL-SCNC: 100 MMOL/L (ref 98–107)
CREAT SERPL-MCNC: 0.63 MG/DL (ref 0.51–0.95)
DEPRECATED HCO3 PLAS-SCNC: 24 MMOL/L (ref 22–29)
EOSINOPHIL # BLD AUTO: 0.1 10E3/UL (ref 0–0.7)
EOSINOPHIL NFR BLD AUTO: 3 %
ERYTHROCYTE [DISTWIDTH] IN BLOOD BY AUTOMATED COUNT: 13.6 % (ref 10–15)
GFR SERPL CREATININE-BSD FRML MDRD: >90 ML/MIN/1.73M2
GLUCOSE SERPL-MCNC: 102 MG/DL (ref 70–99)
HCT VFR BLD AUTO: 33 % (ref 35–47)
HGB BLD-MCNC: 11.3 G/DL (ref 11.7–15.7)
IMM GRANULOCYTES # BLD: 0 10E3/UL
IMM GRANULOCYTES NFR BLD: 0 %
INR PPP: 1.08 (ref 0.85–1.15)
LIPASE SERPL-CCNC: 18 U/L (ref 13–60)
LYMPHOCYTES # BLD AUTO: 1.2 10E3/UL (ref 0.8–5.3)
LYMPHOCYTES NFR BLD AUTO: 28 %
MCH RBC QN AUTO: 30.4 PG (ref 26.5–33)
MCHC RBC AUTO-ENTMCNC: 34.2 G/DL (ref 31.5–36.5)
MCV RBC AUTO: 89 FL (ref 78–100)
MONOCYTES # BLD AUTO: 0.4 10E3/UL (ref 0–1.3)
MONOCYTES NFR BLD AUTO: 9 %
NEUTROPHILS # BLD AUTO: 2.7 10E3/UL (ref 1.6–8.3)
NEUTROPHILS NFR BLD AUTO: 59 %
NRBC # BLD AUTO: 0 10E3/UL
NRBC BLD AUTO-RTO: 0 /100
PLATELET # BLD AUTO: 155 10E3/UL (ref 150–450)
POTASSIUM SERPL-SCNC: 3.6 MMOL/L (ref 3.4–5.3)
PROT SERPL-MCNC: 6.8 G/DL (ref 6.4–8.3)
RBC # BLD AUTO: 3.72 10E6/UL (ref 3.8–5.2)
SODIUM SERPL-SCNC: 135 MMOL/L (ref 136–145)
WBC # BLD AUTO: 4.4 10E3/UL (ref 4–11)

## 2023-05-30 PROCEDURE — 80053 COMPREHEN METABOLIC PANEL: CPT | Performed by: EMERGENCY MEDICINE

## 2023-05-30 PROCEDURE — 96374 THER/PROPH/DIAG INJ IV PUSH: CPT

## 2023-05-30 PROCEDURE — 85730 THROMBOPLASTIN TIME PARTIAL: CPT | Performed by: EMERGENCY MEDICINE

## 2023-05-30 PROCEDURE — 85610 PROTHROMBIN TIME: CPT | Performed by: EMERGENCY MEDICINE

## 2023-05-30 PROCEDURE — 96361 HYDRATE IV INFUSION ADD-ON: CPT

## 2023-05-30 PROCEDURE — 99284 EMERGENCY DEPT VISIT MOD MDM: CPT | Mod: 25

## 2023-05-30 PROCEDURE — 85025 COMPLETE CBC W/AUTO DIFF WBC: CPT | Performed by: EMERGENCY MEDICINE

## 2023-05-30 PROCEDURE — 36415 COLL VENOUS BLD VENIPUNCTURE: CPT | Performed by: EMERGENCY MEDICINE

## 2023-05-30 PROCEDURE — 83690 ASSAY OF LIPASE: CPT | Performed by: EMERGENCY MEDICINE

## 2023-05-30 PROCEDURE — 258N000003 HC RX IP 258 OP 636: Performed by: EMERGENCY MEDICINE

## 2023-05-30 PROCEDURE — 250N000011 HC RX IP 250 OP 636: Performed by: EMERGENCY MEDICINE

## 2023-05-30 RX ORDER — METOCLOPRAMIDE HYDROCHLORIDE 5 MG/ML
10 INJECTION INTRAMUSCULAR; INTRAVENOUS ONCE
Status: COMPLETED | OUTPATIENT
Start: 2023-05-30 | End: 2023-05-30

## 2023-05-30 RX ORDER — ONDANSETRON 4 MG/1
4 TABLET, ORALLY DISINTEGRATING ORAL EVERY 8 HOURS PRN
Qty: 10 TABLET | Refills: 0 | Status: SHIPPED | OUTPATIENT
Start: 2023-05-30 | End: 2023-06-02

## 2023-05-30 RX ORDER — METOCLOPRAMIDE 10 MG/1
10 TABLET ORAL 3 TIMES DAILY PRN
Qty: 30 TABLET | Refills: 0 | Status: ON HOLD | OUTPATIENT
Start: 2023-05-30 | End: 2023-11-16

## 2023-05-30 RX ORDER — ONDANSETRON 2 MG/ML
4 INJECTION INTRAMUSCULAR; INTRAVENOUS ONCE
Status: COMPLETED | OUTPATIENT
Start: 2023-05-30 | End: 2023-05-30

## 2023-05-30 RX ADMIN — SODIUM CHLORIDE 1000 ML: 9 INJECTION, SOLUTION INTRAVENOUS at 17:45

## 2023-05-30 RX ADMIN — ONDANSETRON 4 MG: 2 INJECTION INTRAMUSCULAR; INTRAVENOUS at 17:50

## 2023-05-30 NOTE — ED TRIAGE NOTES
"Patient was seen at the OB and was referred for \"Fluids\" 11 weeks pregnant.      Triage Assessment     Row Name 05/30/23 1072       Triage Assessment (Adult)    Airway WDL WDL       Respiratory WDL    Respiratory WDL WDL       Skin Circulation/Temperature WDL    Skin Circulation/Temperature WDL WDL       Cardiac WDL    Cardiac WDL WDL       Peripheral/Neurovascular WDL    Peripheral Neurovascular WDL WDL       Cognitive/Neuro/Behavioral WDL    Cognitive/Neuro/Behavioral WDL WDL              "

## 2023-05-30 NOTE — ED PROVIDER NOTES
EMERGENCY DEPARTMENT ENCOUNTER      NAME: Amada Melo  AGE: 32 year old female  YOB: 1990  MRN: [id number]  EVALUATION DATE & TIME: 5/30/2023  5:19 PM    PCP: Gretchen Orozco    ED PROVIDER: Carolynn Silva M.D.      Chief Complaint   Patient presents with     Pregnancy Complications         FINAL IMPRESSION:  1. Morning sickness          ED COURSE & MEDICAL DECISION MAKING:    ED Course as of 05/30/23 1835   Tue May 30, 2023   1732 Patient at 11 weeks pregnancy with  morning sickness and nausea/vomiting, keeping some food down, negative extensive ROS, prescribed some zofran and reglan but only has a few left and didn't have yet today, US and obgyn follow up today  normal per her, sent to the ED for zofran/reglan and IVF to help her to get though the next few days as she notes she does feel improved when she receives those medications. CMP, lipase, CBC and coags pending, VS WNL, and will clinically reassess   1822 CMP, CBC and LFTs/lipase WNL and coags WNL, VS WNL, will reassess now.   1831 Patient clinically reassessed and tolerating PO and feeling improved, eager for discharge, Rx to preferred pharmacy for zofran and reglan. Patient discharged after being provided with extensive anticipatory guidance and given return precautions, importance of PMD follow-up emphasized.        Pertinent Labs & Imaging studies reviewed. (See chart for details)    N95 worn  A face shield was worn also  COVID PPE    Medical Decision Making    History:    Supplemental history from: Documented in chart, if applicable    External Record(s) reviewed: Documented in chart, if applicable.    Work Up:    Chart documentation includes differential considered and any EKGs or imaging independently interpreted by provider, where specified.    In additional to work up documented, I considered the following work up: Documented in chart, if applicable.    External consultation:    Discussion of management with another provider:  Documented in chart, if applicable    Complicating factors:    Care impacted by chronic illness: N/A    Care affected by social determinants of health: N/A    Disposition considerations: Discharge. I prescribed additional prescription strength medication(s) as charted. I considered admission, but discharged patient after significant clinical improvement.        At the conclusion of the encounter I discussed the results of all of the tests and the disposition. The questions were answered. The patient or family acknowledged understanding and was agreeable with the care plan.     MEDICATIONS GIVEN IN THE EMERGENCY:  Medications   metoclopramide (REGLAN) injection 10 mg (10 mg Intravenous Not Given 23)   0.9% sodium chloride BOLUS (0 mLs Intravenous Stopped 23)   ondansetron (ZOFRAN) injection 4 mg (4 mg Intravenous $Given 23)       NEW PRESCRIPTIONS STARTED AT TODAY'S ER VISIT  New Prescriptions    METOCLOPRAMIDE (REGLAN) 10 MG TABLET    Take 1 tablet (10 mg) by mouth 3 times daily as needed (nausea)    ONDANSETRON (ZOFRAN ODT) 4 MG ODT TAB    Take 1 tablet (4 mg) by mouth every 8 hours as needed for nausea          =================================================================    HPI      Amada Melo is a 32 year old female () who is approximately 11 weeks pregnant currently followed by maternal fetal medicine with recent normal blood testing and ultrasound who presents to the ED today via walk in with nausea and vomiting.     The patient was with her OB earlier today and was referred to the ED for fluids. With her current pregnancy, she has been having hyperemesis and has previously had improvement with IV fluids. She was prescribed Zofran and Reglan at urgent care but is almost out of these prescriptions and does not think her OB refilled them at her appointment today. She endorses nausea and vomiting but denies cough, diarrhea, vaginal bleeding, abdominal pain, and leg  swelling. The patient has tried to eat today but vomited shortly after.      REVIEW OF SYSTEMS   All other systems reviewed and are negative except as noted above in HPI.    PAST MEDICAL HISTORY:  Past Medical History:   Diagnosis Date     ADD (attention deficit disorder)      Genital herpes      PTSD (post-traumatic stress disorder)      Uncomplicated asthma        PAST SURGICAL HISTORY:  Past Surgical History:   Procedure Laterality Date     DILATION AND CURETTAGE SUCTION N/A 12/18/2022    Procedure: DILATION AND CURETTAGE, UTERUS, USING SUCTION;  Surgeon: Alicia Mota MD;  Location: [facility] OR     ENT SURGERY       PELVIC EXAMINATION UNDER ANESTHESIA N/A 12/18/2022    Procedure: EXAM UNDER ANESTHESIA, GYNECOLOGIC;  Surgeon: Alicia Mota MD;  Location: [facility] OR     TONSILLECTOMY       WISDOM TOOTH EXTRACTION         CURRENT MEDICATIONS:    metoclopramide (REGLAN) 10 MG tablet  ondansetron (ZOFRAN ODT) 4 MG ODT tab  albuterol (PROAIR HFA, PROVENTIL HFA, VENTOLIN HFA) 108 (90 BASE) MCG/ACT inhaler  ALPRAZolam (XANAX) 1 MG tablet  cefdinir (OMNICEF) 300 MG capsule  fexofenadine (ALLEGRA) 60 MG tablet  hydrOXYzine (ATARAX) 25 MG tablet  PARoxetine (PAXIL) 40 MG tablet  Prenatal Vit-Fe Fumarate-FA (PRENATAL MULTIVITAMIN W/IRON) 27-0.8 MG tablet        ALLERGIES:  Allergies   Allergen Reactions     Amoxicillin-Pot Clavulanate Hives     Adhesive Tape      Other reaction(s): Contact Dermatitis  blisters  Chemical burn like reaction to steri strips       Nickel      Skin irritation      Penicillins Hives     Steri Strips      blister       FAMILY HISTORY:  Family History   Problem Relation Age of Onset     Depression Mother      Depression Father      Asthma Mother      Cervical Cancer Mother      Cerebrovascular Disease Maternal Grandmother      Hypercalcemia Paternal Grandmother      Hypertension Paternal Grandmother        SOCIAL HISTORY:   Social History     Socioeconomic  "History     Marital status: Legally      Spouse name: seperated for 4 years     Number of children: 1   Occupational History     Occupation: unemployed     Employer: [employer]   Tobacco Use     Smoking status: Former     Packs/day: 0.50     Types: Cigarettes     Quit date: 2018     Years since quittin.7     Smokeless tobacco: Never     Tobacco comments:     5 cigs per day---also does e-cig   Substance and Sexual Activity     Alcohol use: Yes     Alcohol/week: 0.0 standard drinks of alcohol     Comment: Alcoholic Drinks/day: \"sometimes\" - not in pregnancy     Drug use: No     Sexual activity: Not Currently       VITALS:  Patient Vitals for the past 24 hrs:   BP Temp Temp src Pulse Resp SpO2 Height Weight   23 1830 (!) 141/70 -- -- 74 -- 100 % -- --   23 1820 136/70 -- -- 68 -- 100 % -- --   23 1719 113/58 -- -- -- -- -- -- --   23 1717 -- 97.7  F (36.5  C) Temporal 75 16 100 % 1.803 m (5' 11\") 115.2 kg (253 lb 15.5 oz)       PHYSICAL EXAM    GENERAL: Awake, alert.  In no acute distress.   HEENT: Normocephalic, atraumatic.  Pupils equal, round and reactive.  Conjunctiva normal.  EOMI.  NECK: No stridor or apparent deformity.  PULMONARY: Symmetrical breath sounds without distress.  Lungs clear to auscultation bilaterally without wheezes, rhonchi or rales.  CARDIO: Regular rate and rhythm.  No significant murmur, rub or gallop.  Radial pulses strong and symmetrical.  ABDOMINAL: Abdomen soft, non-distended and non-tender to palpation.  No CVAT, no palpable hepatosplenomegaly.  EXTREMITIES: No lower extremity swelling or edema.    NEURO: Alert and oriented to person, place and time.  Cranial nerves grossly intact.  No focal motor deficit.  PSYCH: Normal mood and affect  SKIN: No rashes      LAB:  All pertinent labs reviewed and interpreted.  Results for orders placed or performed during the hospital encounter of 23   Comprehensive metabolic panel   Result Value Ref Range    " Sodium 135 (L) 136 - 145 mmol/L    Potassium 3.6 3.4 - 5.3 mmol/L    Chloride 100 98 - 107 mmol/L    Carbon Dioxide (CO2) 24 22 - 29 mmol/L    Anion Gap 11 7 - 15 mmol/L    Urea Nitrogen 8.2 6.0 - 20.0 mg/dL    Creatinine 0.63 0.51 - 0.95 mg/dL    Calcium 9.4 8.6 - 10.0 mg/dL    Glucose 102 (H) 70 - 99 mg/dL    Alkaline Phosphatase 62 35 - 104 U/L    AST 17 10 - 35 U/L    ALT 10 10 - 35 U/L    Protein Total 6.8 6.4 - 8.3 g/dL    Albumin 3.9 3.5 - 5.2 g/dL    Bilirubin Total <0.2 <=1.2 mg/dL    GFR Estimate >90 >60 mL/min/1.73m2   Result Value Ref Range    Lipase 18 13 - 60 U/L   Result Value Ref Range    INR 1.08 0.85 - 1.15   Partial thromboplastin time   Result Value Ref Range    aPTT 29 22 - 38 Seconds   CBC with platelets and differential   Result Value Ref Range    WBC Count 4.4 4.0 - 11.0 10e3/uL    RBC Count 3.72 (L) 3.80 - 5.20 10e6/uL    Hemoglobin 11.3 (L) 11.7 - 15.7 g/dL    Hematocrit 33.0 (L) 35.0 - 47.0 %    MCV 89 78 - 100 fL    MCH 30.4 26.5 - 33.0 pg    MCHC 34.2 31.5 - 36.5 g/dL    RDW 13.6 10.0 - 15.0 %    Platelet Count 155 150 - 450 10e3/uL    % Neutrophils 59 %    % Lymphocytes 28 %    % Monocytes 9 %    % Eosinophils 3 %    % Basophils 1 %    % Immature Granulocytes 0 %    NRBCs per 100 WBC 0 <1 /100    Absolute Neutrophils 2.7 1.6 - 8.3 10e3/uL    Absolute Lymphocytes 1.2 0.8 - 5.3 10e3/uL    Absolute Monocytes 0.4 0.0 - 1.3 10e3/uL    Absolute Eosinophils 0.1 0.0 - 0.7 10e3/uL    Absolute Basophils 0.0 0.0 - 0.2 10e3/uL    Absolute Immature Granulocytes 0.0 <=0.4 10e3/uL    Absolute NRBCs 0.0 10e3/uL                 I, Kaiser Goncalves , am serving as a scribe to document services personally performed by Dr. Carolynn Silva based on my observation and the provider's statements to me. I, Carolynn Silva MD attest that Kaiser Goncalves  is acting in a scribe capacity, has observed my performance of the services and has documented them in accordance with my direction.     Carolynn Silva MD  05/30/23  1835

## 2023-05-31 ENCOUNTER — HOSPITAL ENCOUNTER (EMERGENCY)
Facility: HOSPITAL | Age: 33
Discharge: HOME | End: 2023-05-31
Attending: EMERGENCY MEDICINE | Admitting: EMERGENCY MEDICINE
Payer: COMMERCIAL

## 2023-05-31 ENCOUNTER — APPOINTMENT (OUTPATIENT)
Dept: ULTRASOUND IMAGING | Facility: HOSPITAL | Age: 33
End: 2023-05-31
Attending: EMERGENCY MEDICINE
Payer: COMMERCIAL

## 2023-05-31 VITALS
RESPIRATION RATE: 20 BRPM | WEIGHT: 253.97 LBS | SYSTOLIC BLOOD PRESSURE: 127 MMHG | TEMPERATURE: 98 F | HEIGHT: 71 IN | BODY MASS INDEX: 35.56 KG/M2 | DIASTOLIC BLOOD PRESSURE: 61 MMHG | HEART RATE: 73 BPM | OXYGEN SATURATION: 96 %

## 2023-05-31 DIAGNOSIS — O46.90 VAGINAL BLEEDING IN PREGNANCY: ICD-10-CM

## 2023-05-31 LAB
ABO/RH(D): NORMAL
ALBUMIN UR-MCNC: NEGATIVE MG/DL
ANION GAP SERPL CALCULATED.3IONS-SCNC: 12 MMOL/L (ref 7–15)
APPEARANCE UR: CLEAR
BASOPHILS # BLD AUTO: 0 10E3/UL (ref 0–0.2)
BASOPHILS NFR BLD AUTO: 0 %
BILIRUB UR QL STRIP: NEGATIVE
BUN SERPL-MCNC: 10.3 MG/DL (ref 6–20)
CALCIUM SERPL-MCNC: 9.3 MG/DL (ref 8.6–10)
CHLORIDE SERPL-SCNC: 102 MMOL/L (ref 98–107)
CLUE CELLS: ABNORMAL
COLOR UR AUTO: ABNORMAL
CREAT SERPL-MCNC: 0.58 MG/DL (ref 0.51–0.95)
DEPRECATED HCO3 PLAS-SCNC: 21 MMOL/L (ref 22–29)
EOSINOPHIL # BLD AUTO: 0.1 10E3/UL (ref 0–0.7)
EOSINOPHIL NFR BLD AUTO: 2 %
ERYTHROCYTE [DISTWIDTH] IN BLOOD BY AUTOMATED COUNT: 13.4 % (ref 10–15)
GFR SERPL CREATININE-BSD FRML MDRD: >90 ML/MIN/1.73M2
GLUCOSE SERPL-MCNC: 91 MG/DL (ref 70–99)
GLUCOSE UR STRIP-MCNC: NEGATIVE MG/DL
HCG INTACT+B SERPL-ACNC: ABNORMAL MIU/ML
HCT VFR BLD AUTO: 34.5 % (ref 35–47)
HGB BLD-MCNC: 11.6 G/DL (ref 11.7–15.7)
HGB UR QL STRIP: NEGATIVE
HOLD SPECIMEN: NORMAL
HOLD SPECIMEN: NORMAL
IMM GRANULOCYTES # BLD: 0 10E3/UL
IMM GRANULOCYTES NFR BLD: 0 %
KETONES UR STRIP-MCNC: ABNORMAL MG/DL
LEUKOCYTE ESTERASE UR QL STRIP: NEGATIVE
LYMPHOCYTES # BLD AUTO: 1.4 10E3/UL (ref 0.8–5.3)
LYMPHOCYTES NFR BLD AUTO: 26 %
MAGNESIUM SERPL-MCNC: 2 MG/DL (ref 1.7–2.3)
MCH RBC QN AUTO: 30.2 PG (ref 26.5–33)
MCHC RBC AUTO-ENTMCNC: 33.6 G/DL (ref 31.5–36.5)
MCV RBC AUTO: 90 FL (ref 78–100)
MONOCYTES # BLD AUTO: 0.5 10E3/UL (ref 0–1.3)
MONOCYTES NFR BLD AUTO: 9 %
MUCOUS THREADS #/AREA URNS LPF: PRESENT /LPF
NEUTROPHILS # BLD AUTO: 3.2 10E3/UL (ref 1.6–8.3)
NEUTROPHILS NFR BLD AUTO: 63 %
NITRATE UR QL: NEGATIVE
NRBC # BLD AUTO: 0 10E3/UL
NRBC BLD AUTO-RTO: 0 /100
PH UR STRIP: 6 [PH] (ref 5–7)
PLATELET # BLD AUTO: 161 10E3/UL (ref 150–450)
POTASSIUM SERPL-SCNC: 3.9 MMOL/L (ref 3.4–5.3)
RBC # BLD AUTO: 3.84 10E6/UL (ref 3.8–5.2)
RBC URINE: 0 /HPF
SODIUM SERPL-SCNC: 135 MMOL/L (ref 136–145)
SP GR UR STRIP: 1.03 (ref 1–1.03)
SPECIMEN EXPIRATION DATE: NORMAL
SQUAMOUS EPITHELIAL: 1 /HPF
TRICHOMONAS, WET PREP: ABNORMAL
UROBILINOGEN UR STRIP-MCNC: <2 MG/DL
WBC # BLD AUTO: 5.2 10E3/UL (ref 4–11)
WBC URINE: <1 /HPF
WBC'S/HIGH POWER FIELD, WET PREP: ABNORMAL
YEAST, WET PREP: ABNORMAL

## 2023-05-31 PROCEDURE — 87591 N.GONORRHOEAE DNA AMP PROB: CPT | Performed by: EMERGENCY MEDICINE

## 2023-05-31 PROCEDURE — 36415 COLL VENOUS BLD VENIPUNCTURE: CPT | Performed by: EMERGENCY MEDICINE

## 2023-05-31 PROCEDURE — 84702 CHORIONIC GONADOTROPIN TEST: CPT | Performed by: EMERGENCY MEDICINE

## 2023-05-31 PROCEDURE — 86901 BLOOD TYPING SEROLOGIC RH(D): CPT | Performed by: EMERGENCY MEDICINE

## 2023-05-31 PROCEDURE — 85025 COMPLETE CBC W/AUTO DIFF WBC: CPT | Performed by: EMERGENCY MEDICINE

## 2023-05-31 PROCEDURE — 83735 ASSAY OF MAGNESIUM: CPT | Performed by: EMERGENCY MEDICINE

## 2023-05-31 PROCEDURE — 96360 HYDRATION IV INFUSION INIT: CPT

## 2023-05-31 PROCEDURE — 76801 OB US < 14 WKS SINGLE FETUS: CPT

## 2023-05-31 PROCEDURE — 258N000003 HC RX IP 258 OP 636: Performed by: EMERGENCY MEDICINE

## 2023-05-31 PROCEDURE — 81001 URINALYSIS AUTO W/SCOPE: CPT | Performed by: EMERGENCY MEDICINE

## 2023-05-31 PROCEDURE — 87210 SMEAR WET MOUNT SALINE/INK: CPT | Performed by: EMERGENCY MEDICINE

## 2023-05-31 PROCEDURE — 87491 CHLMYD TRACH DNA AMP PROBE: CPT | Performed by: EMERGENCY MEDICINE

## 2023-05-31 PROCEDURE — 82310 ASSAY OF CALCIUM: CPT | Performed by: EMERGENCY MEDICINE

## 2023-05-31 PROCEDURE — 99284 EMERGENCY DEPT VISIT MOD MDM: CPT | Mod: 25

## 2023-05-31 RX ADMIN — SODIUM CHLORIDE 1000 ML: 9 INJECTION, SOLUTION INTRAVENOUS at 19:30

## 2023-05-31 ASSESSMENT — ENCOUNTER SYMPTOMS
DIARRHEA: 0
VOMITING: 1
NAUSEA: 1
DYSURIA: 0
ABDOMINAL PAIN: 1
FEVER: 0
COUGH: 0
SHORTNESS OF BREATH: 0

## 2023-05-31 ASSESSMENT — ACTIVITIES OF DAILY LIVING (ADL)
ADLS_ACUITY_SCORE: 35

## 2023-05-31 NOTE — ED TRIAGE NOTES
11 weeks pregnant, started to have heavy bright vaginal bleeding 1 hour ago. Is having some pelvic cramping. Is concerned as she lost a baby in January during 2nd trimester.

## 2023-05-31 NOTE — ED PROVIDER NOTES
EMERGENCY DEPARTMENT ENCOUNTER      NAME: Amada Melo  AGE: 32 year old female  YOB: 1990  MRN: [id number]  EVALUATION DATE & TIME: 2023  4:35 PM    PCP: Gretchen Orozco    ED PROVIDER: Gretchen Nichols M.D.        Chief Complaint   Patient presents with     Vaginal Bleeding - Pregnant         FINAL IMPRESSION:    1. Vaginal bleeding in pregnancy            MEDICAL DECISION MAKIN year old female with history of multiple miscarriages who presents emergency department 11 weeks pregnancy with some vaginal bleeding and spotting today.  She did have an ultrasound yesterday which shows a male fetus at 11 weeks.  Pelvic exam unremarkable.  Laboratory stable at this time.  Patient signed out at change of shift awaiting ultrasound results and anticipate discharge home.  Nothing at this time to suggest ectopic pregnancy.  Even if this did represent a miscarriage she is too early to try to intervene.  Patient is aware of this.  Ultrasound is being done for reassurance with the patient and her mental wellbeing.  Patient is understandably concerned about a recurrent miscarriage given her history.      ED COURSE:  4:43 PM  I met with the patient to gather history and perform my exam. ED course and treatment discussed.    5:45 PM  Exam was done with nursing at bedside.  Patient tolerated very well.  No active bleeding appreciated at this time.  There is a little bit of irritation around the os of the cervix, but otherwise unremarkable exam.  No significant discharge.    6:22 PM  Patient signed out at change of shift to my partner awaiting ultrasound results and disposition.  Overall dissipate discharge home.  She is O+ and therefore no indication for RhoGAM.  No active bleeding on pelvic exam at this time.    I do not think that this represents ACS, PE, ruptured AAA, aortic dissection, bowel obstruction, bowel ischemia, cholecystitis, pancreatitis, appendicitis, diverticulitis, kidney  stone, pyelonephritis, incarcerated or strangulated hernia, ovarian torsion, PID, ectopic pregnancy, tubo-ovarian abscess, viscus perforation, perforated GI ulcer, or other such etiologies at this time.    CONSULTANTS:  none        MEDICATIONS GIVEN IN THE EMERGENCY:  Medications   0.9% sodium chloride BOLUS (has no administration in time range)           NEW PRESCRIPTIONS STARTED AT TODAY'S ER VISIT     Medication List      There are no discharge medications for this visit.             CONDITION:  stable        DISPOSITION:  pending         =================================================================  =================================================================  TRIAGE ASSESSMENT:  11 weeks pregnant, started to have heavy bright vaginal bleeding 1 hour ago, had some dark brown spotting yesterday. Is having some pelvic cramping. Is concerned as she lost a baby in January during 2nd trimester.        11 weeks pregnant, started to have heavy bright vaginal bleeding 1 hour ago. Is having some pelvic cramping. Is concerned as she lost a baby in January during 2nd trimester.        Triage Assessment     Row Name 05/31/23 1611       Triage Assessment (Adult)    Airway WDL WDL       Respiratory WDL    Respiratory WDL WDL       Breath Sounds    Breath Sounds All Fields       Skin Circulation/Temperature WDL    Skin Circulation/Temperature WDL WDL       Peripheral/Neurovascular WDL    Peripheral Neurovascular WDL WDL       Cognitive/Neuro/Behavioral WDL    Cognitive/Neuro/Behavioral WDL WDL                     ED Triage Vitals [05/31/23 1614]   Enc Vitals Group      BP (!) 140/76      Pulse 86      Resp 20      Temp 98  F (36.7  C)      Temp src Oral      SpO2 98 %          ================================================================  ================================================================    HPI    Patient information was obtained from: patient    Use of Intrepreter: N/A     Amada Melo is a 32  year old female  with history of multiple miscarriages, most recent in January, currently 11 weeks pregnant, who started noticing some vaginal bleeding about an hour ago and some abdominal cramping..  She did have an ultrasound yesterday at OB/GYN clinic that showed an IUP at 11 weeks and a consistent with a male fetus.  She has not soaked through a pad yet at this point.  She contacted her OB/GYN clinic who recommended she come to the ER for peace of mind given her recent miscarriage.      She was just seen yesterday for vomiting associated with pregnancy.  Otherwise denies any fevers, cough, chest pain, shortness of breath, diarrhea.  She has been trying to drink water and think she has had a bottle bottles with the water today but has had difficulty keeping it down due to her nausea.  She has been using her antiemetics.    She does have a history of herpes but denies any history of gonorrhea or chlamydia infections.      REVIEW OF SYSTEMS  Review of Systems   Constitutional: Negative for fever.   Respiratory: Negative for cough and shortness of breath.    Cardiovascular: Negative for chest pain.   Gastrointestinal: Positive for abdominal pain, nausea and vomiting. Negative for diarrhea.   Genitourinary: Positive for vaginal bleeding. Negative for dysuria.   Allergic/Immunologic: Negative for immunocompromised state.   All other systems reviewed and are negative.        PAST MEDICAL HISTORY:  Past Medical History:   Diagnosis Date     ADD (attention deficit disorder)      Genital herpes      PTSD (post-traumatic stress disorder)      Uncomplicated asthma          PAST SURGICAL HISTORY:  Past Surgical History:   Procedure Laterality Date     DILATION AND CURETTAGE SUCTION N/A 2022    Procedure: DILATION AND CURETTAGE, UTERUS, USING SUCTION;  Surgeon: Alicia Mota MD;  Location: [facility] OR     ENT SURGERY       PELVIC EXAMINATION UNDER ANESTHESIA N/A 2022    Procedure: EXAM UNDER  ANESTHESIA, GYNECOLOGIC;  Surgeon: Alicia Mota MD;  Location: [facility] OR     TONSILLECTOMY       WISDOM TOOTH EXTRACTION           CURRENT MEDICATIONS:    Prior to Admission medications    Medication Sig Start Date End Date Taking? Authorizing Provider   albuterol (PROAIR HFA, PROVENTIL HFA, VENTOLIN HFA) 108 (90 BASE) MCG/ACT inhaler Inhale 2 puffs into the lungs every 4 hours as needed    Reported, Patient   ALPRAZolam (XANAX) 1 MG tablet Take 1 tablet (1 mg) by mouth 3 times daily as needed for anxiety 12/19/22   Alicia Chapman MD   cefdinir (OMNICEF) 300 MG capsule Take 1 capsule (300 mg) by mouth 2 times daily 5/19/23   Les Santa MD   fexofenadine (ALLEGRA) 60 MG tablet Take 1 tablet (60 mg) by mouth 2 times daily 5/19/23   Les Santa MD   hydrOXYzine (ATARAX) 25 MG tablet Take 2-4 tablets ( mg) by mouth 3 times daily as needed for anxiety 12/31/22   Patsy Cartagena MD   metoclopramide (REGLAN) 10 MG tablet Take 1 tablet (10 mg) by mouth 3 times daily as needed (nausea) 5/30/23   Carolynn Silva MD   ondansetron (ZOFRAN ODT) 4 MG ODT tab Take 1 tablet (4 mg) by mouth every 8 hours as needed for nausea 5/30/23 6/2/23  Carolynn Silva MD   PARoxetine (PAXIL) 40 MG tablet Take 40 mg by mouth every morning    Unknown, Entered By History   Prenatal Vit-Fe Fumarate-FA (PRENATAL MULTIVITAMIN W/IRON) 27-0.8 MG tablet Take 1 tablet by mouth daily    Unknown, Entered By History         ALLERGIES:  Allergies   Allergen Reactions     Amoxicillin-Pot Clavulanate Hives     Adhesive Tape      Other reaction(s): Contact Dermatitis  blisters  Chemical burn like reaction to steri strips       Nickel      Skin irritation      Penicillins Hives     Steri Strips      blister         FAMILY HISTORY:  Family History   Problem Relation Age of Onset     Depression Mother      Depression Father      Asthma Mother      Cervical Cancer Mother      Cerebrovascular Disease Maternal  "Grandmother      Hypercalcemia Paternal Grandmother      Hypertension Paternal Grandmother          SOCIAL HISTORY:  Social History     Socioeconomic History     Marital status: Legally      Spouse name: seperated for 4 years     Number of children: 1   Occupational History     Occupation: unemployed     Employer: [employer]   Tobacco Use     Smoking status: Former     Packs/day: 0.50     Types: Cigarettes     Quit date: 2018     Years since quittin.7     Smokeless tobacco: Never     Tobacco comments:     5 cigs per day---also does e-cig   Substance and Sexual Activity     Alcohol use: Yes     Alcohol/week: 0.0 standard drinks of alcohol     Comment: Alcoholic Drinks/day: \"sometimes\" - not in pregnancy     Drug use: No     Sexual activity: Not Currently         VITALS:  Patient Vitals for the past 24 hrs:   BP Temp Temp src Pulse Resp SpO2 Height Weight   23 1751 -- -- -- -- -- -- -- 115.2 kg (253 lb 15.5 oz)   23 1750 -- -- -- -- -- -- 1.803 m (5' 11\") --   23 1614 (!) 140/76 98  F (36.7  C) Oral 86 20 98 % -- --       Wt Readings from Last 3 Encounters:   23 115.2 kg (253 lb 15.5 oz)   23 115.2 kg (253 lb 15.5 oz)   23 115.8 kg (255 lb 4.7 oz)       Estimated Creatinine Clearance: 191.2 mL/min (based on SCr of 0.58 mg/dL).    PHYSICAL EXAM    Constitutional:  Well developed, Well nourished, NAD, GCS 15  HENT:  Normocephalic, Atraumatic, Bilateral external ears normal, Nose normal. Neck- Supple, No stridor.   Eyes:  PERRL, EOMI, Conjunctiva normal, No discharge.  Respiratory:  Normal breath sounds, No respiratory distress, No wheezing, Speaks full sentences easily. No cough.   Cardiovascular:  Normal heart rate, Regular rhythm,No rubs, No gallops. Chest wall nontender.  GI:  +obesity.  Bowel sounds normal, Soft, No tenderness, No masses, No flank tenderness. No rebound or guarding.   : Chaperone: RN.  Normal external genitalia.  No gross blood in the vaginal " vault.  No bleeding coming from the cervical os.  There is a slight amount of irritation around the cervical os but overall unremarkable exam.  No concerning discharge.  No signs of products of conception.  No cervical motion tenderness.  Musculoskeletal:  No cyanosis, No clubbing. Good range of motion in all major joints. No tenderness to palpation or major deformities noted.   Integument:  Warm, Dry, No erythema, No rash.  No petechiae.   Neurologic:  Alert & oriented x 3  Psychiatric:  Affect normal, Cooperative         LAB:  All pertinent labs reviewed and interpreted.  Recent Results (from the past 24 hour(s))   Basic metabolic panel    Collection Time: 05/31/23  4:58 PM   Result Value Ref Range    Sodium 135 (L) 136 - 145 mmol/L    Potassium 3.9 3.4 - 5.3 mmol/L    Chloride 102 98 - 107 mmol/L    Carbon Dioxide (CO2) 21 (L) 22 - 29 mmol/L    Anion Gap 12 7 - 15 mmol/L    Urea Nitrogen 10.3 6.0 - 20.0 mg/dL    Creatinine 0.58 0.51 - 0.95 mg/dL    Calcium 9.3 8.6 - 10.0 mg/dL    Glucose 91 70 - 99 mg/dL    GFR Estimate >90 >60 mL/min/1.73m2   HCG quantitative pregnancy (blood)    Collection Time: 05/31/23  4:58 PM   Result Value Ref Range    hCG Quantitative 43,721 (H) <5 mIU/mL   ABO and Rh    Collection Time: 05/31/23  4:58 PM   Result Value Ref Range    ABO/RH(D) O POS     SPECIMEN EXPIRATION DATE [id number]    CBC with platelets and differential    Collection Time: 05/31/23  4:58 PM   Result Value Ref Range    WBC Count 5.2 4.0 - 11.0 10e3/uL    RBC Count 3.84 3.80 - 5.20 10e6/uL    Hemoglobin 11.6 (L) 11.7 - 15.7 g/dL    Hematocrit 34.5 (L) 35.0 - 47.0 %    MCV 90 78 - 100 fL    MCH 30.2 26.5 - 33.0 pg    MCHC 33.6 31.5 - 36.5 g/dL    RDW 13.4 10.0 - 15.0 %    Platelet Count 161 150 - 450 10e3/uL    % Neutrophils 63 %    % Lymphocytes 26 %    % Monocytes 9 %    % Eosinophils 2 %    % Basophils 0 %    % Immature Granulocytes 0 %    NRBCs per 100 WBC 0 <1 /100    Absolute Neutrophils 3.2 1.6 - 8.3 10e3/uL     Absolute Lymphocytes 1.4 0.8 - 5.3 10e3/uL    Absolute Monocytes 0.5 0.0 - 1.3 10e3/uL    Absolute Eosinophils 0.1 0.0 - 0.7 10e3/uL    Absolute Basophils 0.0 0.0 - 0.2 10e3/uL    Absolute Immature Granulocytes 0.0 <=0.4 10e3/uL    Absolute NRBCs 0.0 10e3/uL   Extra Blue Top Tube    Collection Time: 05/31/23  4:58 PM   Result Value Ref Range    Hold Specimen JIC    Extra Red Top Tube    Collection Time: 05/31/23  4:58 PM   Result Value Ref Range    Hold Specimen JIC        Lab Results   Component Value Date    ABORH O POS 05/31/2023           RADIOLOGY:  Reviewed all pertinent imaging. Please see official radiology report.    US OB <14 Weeks w Transvaginal Single    (Results Pending)         EKG:    none    PROCEDURES:  None      Medical Decision Making    History:    Supplemental history from: Documented in chart, if applicable    External Record(s) reviewed: Documented in chart, if applicable.    Work Up:    Chart documentation includes differential considered and any EKGs or imaging independently interpreted by provider, where specified.    In additional to work up documented, I considered the following work up: Documented in chart, if applicable.    External consultation:    Discussion of management with another provider: Documented in chart, if applicable    Complicating factors:    Care impacted by chronic illness: Other: pregnant with history of several miscarriages    Care affected by social determinants of health: N/A    Disposition considerations: Patient signed out at change of shift awaiting ultrasound results and likely discharge home.        Gretchen Nichols M.D. [facility]  Emergency Medicine and Medical Toxicology  [facility] EMERGENCY DEPARTMENT  [facility] [facility] [address]  [phone]  Dept: [phone]           Gretchen Nichols MD  05/31/23 4367

## 2023-05-31 NOTE — ED NOTES
"EMERGENCY DEPARTMENT SIGN OUT NOTE        ED COURSE AND MEDICAL DECISION MAKING  Patient was signed out to me by Dr Gretchen Nichols at 6:20 PM    In brief, Amada Melo is a 32 year old female who initially presented with vaginal bleeding.    \"The patient is currently 11 weeks pregnant, , with history of multiple miscarriages, most recent in January. She reports that she started noticing some vaginal bleeding about an hour ago and some abdominal cramping. She did have an ultrasound yesterday at OB/GYN clinic that showed an IUP at 11 weeks and a consistent with a male fetus.  She has not soaked through a pad yet at this point.  She contacted her OB/GYN clinic who recommended she come to the ER for peace of mind given her recent miscarriage.  She was just seen yesterday for vomiting associated with pregnancy.  Otherwise denies any fevers, cough, chest pain, shortness of breath, diarrhea.  She has been using her antiemetics. She does have a history of herpes but denies any history of gonorrhea or chlamydia infections. \"  See prior physicians note for full history and physical exam and prior emergency department course.    At time of sign out, disposition was pending ultrasound results and disposition.  Hemoglobin is stable.  She is O+ and thus does not necessitate RhoGAM.  Wet prep unremarkable.  Urinalysis unremarkable. Ultrasound reveals a single living intrauterine gestation with a previously documented EDC of 2023.  Normal interval growth.  The placenta is not developed.  No significant subchorionic hemorrhage identified.  Patient has not had any significant bleeding here.  Prior physician performed pelvic exam, see their note.  At this point recommended the patient follow-up with their OB/GYN for reevaluation.  She was given indications for return to emergency room.  She voiced understanding was comfortable with this plan.  She was discharged home in stable condition.    FINAL IMPRESSION    1. " Vaginal bleeding in pregnancy        ED MEDS  Medications   0.9% sodium chloride BOLUS (0 mLs Intravenous Stopped 5/31/23 2047)       LAB  Labs Ordered and Resulted from Time of ED Arrival to Time of ED Departure   BASIC METABOLIC PANEL - Abnormal       Result Value    Sodium 135 (*)     Potassium 3.9      Chloride 102      Carbon Dioxide (CO2) 21 (*)     Anion Gap 12      Urea Nitrogen 10.3      Creatinine 0.58      Calcium 9.3      Glucose 91      GFR Estimate >90     HCG QUANTITATIVE PREGNANCY - Abnormal    hCG Quantitative 43,721 (*)    ROUTINE UA WITH MICROSCOPIC REFLEX TO CULTURE - Abnormal    Color Urine Light Yellow      Appearance Urine Clear      Glucose Urine Negative      Bilirubin Urine Negative      Ketones Urine Trace (*)     Specific Gravity Urine 1.029      Blood Urine Negative      pH Urine 6.0      Protein Albumin Urine Negative      Urobilinogen Urine <2.0      Nitrite Urine Negative      Leukocyte Esterase Urine Negative      Mucus Urine Present (*)     RBC Urine 0      WBC Urine <1      Squamous Epithelials Urine 1     CBC WITH PLATELETS AND DIFFERENTIAL - Abnormal    WBC Count 5.2      RBC Count 3.84      Hemoglobin 11.6 (*)     Hematocrit 34.5 (*)     MCV 90      MCH 30.2      MCHC 33.6      RDW 13.4      Platelet Count 161      % Neutrophils 63      % Lymphocytes 26      % Monocytes 9      % Eosinophils 2      % Basophils 0      % Immature Granulocytes 0      NRBCs per 100 WBC 0      Absolute Neutrophils 3.2      Absolute Lymphocytes 1.4      Absolute Monocytes 0.5      Absolute Eosinophils 0.1      Absolute Basophils 0.0      Absolute Immature Granulocytes 0.0      Absolute NRBCs 0.0     WET PREPARATION - Abnormal    Trichomonas Absent      Yeast Absent      Clue Cells Absent      WBCs/high power field 2+ (*)    MAGNESIUM - Normal    Magnesium 2.0     ABO AND RH    ABO/RH(D) O POS      SPECIMEN EXPIRATION DATE [id number]     CHLAMYDIA TRACHOMATIS PCR   NEISSERIA GONORRHOEAE PCR        RADIOLOGY    US OB <14 Weeks w Transvaginal Single   Final Result   IMPRESSION:    1.  Single living intrauterine gestation with a previously documented EDC of 12/19/2023] and earlier outside some. Normal interval growth.   2.  The placenta is not developed. No significant subchorionic hemorrhage identified.                DISCHARGE MEDS  Discharge Medication List as of 5/31/2023  8:48 PM            I, Daylin Watson, am serving as a scribe to document services personally performed by Danita Crews MD based on my observation and the provider's statements to me. I, Danita Crews MD, attest that Daylin Watson is acting in a scribe capacity, has observed my performance of the services and has documented them in accordance with my direction.    Danita Crews MD  [facility] EMERGENCY DEPARTMENT  [address] [place] [id number]  [phone]     Danita Crews MD  06/01/23 0148

## 2023-05-31 NOTE — ED TRIAGE NOTES
Triage Assessment     Row Name 05/31/23 1611       Triage Assessment (Adult)    Airway WDL WDL       Respiratory WDL    Respiratory WDL WDL       Breath Sounds    Breath Sounds All Fields       Skin Circulation/Temperature WDL    Skin Circulation/Temperature WDL WDL       Peripheral/Neurovascular WDL    Peripheral Neurovascular WDL WDL       Cognitive/Neuro/Behavioral WDL    Cognitive/Neuro/Behavioral WDL WDL            11 weeks pregnant, started to have heavy bright vaginal bleeding 1 hour ago, had some dark brown spotting yesterday. Is having some pelvic cramping. Is concerned as she lost a baby in January during 2nd trimester.

## 2023-06-01 LAB
C TRACH DNA SPEC QL NAA+PROBE: NEGATIVE
N GONORRHOEA DNA SPEC QL NAA+PROBE: NEGATIVE

## 2023-06-09 ENCOUNTER — TELEPHONE (OUTPATIENT)
Dept: MATERNAL FETAL MEDICINE | Facility: CLINIC | Age: 33
End: 2023-06-09
Payer: COMMERCIAL

## 2023-06-09 ENCOUNTER — TRANSFERRED RECORDS (OUTPATIENT)
Dept: HEALTH INFORMATION MANAGEMENT | Facility: CLINIC | Age: 33
End: 2023-06-09
Payer: COMMERCIAL

## 2023-06-09 NOTE — TELEPHONE ENCOUNTER
June 9, 2023    Call to Rolanda to discuss upcoming appointments. Rolanda has had a CVS for reassurance in her last two pregnancies after her 2018 pregnancy with trisomy 21. Unfortunately, in her 2022 pregnancy, results all returned normal but the pregnancy resulted in a miscarriage at 14w4d.     Rolanda and Matt are leaning away from proceeding with CVS. They have already had non-invasive screening that was low-risk for common chromosome conditions.     Rolanda shared that there have been some significant updates regarding family history. We will discuss further at her upcoming appointments.     Nicole Ventura MS, Located within [facility]  Licensed Genetic Counselor  [facility]  Maternal Fetal Medicine  [email or web address]  [phone]

## 2023-06-12 NOTE — PROGRESS NOTES
[facility] Maternal Fetal Medicine Center  Genetic Counseling Consult    Patient:  Amada Melo YOB: 1990   Date of Service:  23   MRN: [id number]    Amada was seen at the [facility] M[facility] for genetic consultation. The indication for genetic counseling is advanced maternal age and previous pregnancy with trisomy 21. The patient was accompanied to this visit by their partner, Matt, and son, Denis.     The session was conducted in English.      IMPRESSION/ PLAN   1. Amada had genetic screening earlier in this pregnancy. Their non-invasive prenatal test was screen negative or low risk for screened conditions     2. Amada and Matt opted to pursue expanded carrier screening through [employer], including fragile X, which assesses carrier status for 569 conditions. Results are expected within 2-4 weeks, and will be released in [employer]. We will contact the couple to discuss the results, and a copy with be forwarded to the office of the referring OB provider. Amada provided permission for detailed results to be left on her voicemail and Matt would like his results called to Amada; Matt gave written permission to discuss his results with Amada. The couple would prefer to be contacted once both of their results become available.    3. Amada had a first trimester complete ultrasound today. Please see the ultrasound report for further details. Amada also had an MFM consultation today. Please see the ultrasound report or consult note for more further details.     4. Further recommendations include a 2/3 complete ultrasound and level II ultrasound with MFM as well as maternal serum AFP only screening for neural tube defects, if desired (quad screen should NOT be performed).    PREGNANCY HISTORY   /Parity:       Amada's pregnancy history is significant for:     SABx1 no testing     full-term delivery, male,  "recent diagnosis of autism.    2018 IAB, trisomy 21. Microarray confirmed trisomy 21 with mosaicism; limited karyotype indicated non-disjunction     2019 full-term delivery, male, alive and well. CVS performed for reassurance with normal results.     2022 SAB at 14w4d. CVS performed at 12w2d with normal results.    Rolanda's 2022 pregnancy and the current pregnancy are with Matt. Her other pregnancies have been with other partners.     CURRENT PREGNANCY   Current Age: 32 year old   Age at Delivery: 33 year old  BOWEN: Not found.                                   Gestational Age: 13w0d  This pregnancy is a single gestation.   This pregnancy was conceived spontaneously.    The couple had concerns regarding Matt's use of methotrexate during the time of conception. At this time, there is no evidence to suggest that paternal exposure to methotrexate impacts pregnancy in any way.     MEDICAL HISTORY   Amada s reported medical history is not expected to impact pregnancy management or risks to fetal development.       FAMILY HISTORY   A three-generation pedigree was obtained today and is scanned under the \"Media\" tab in Epic. The family history was reported by Amada and their partner.    The following significant findings were reported today:     Rolanda's eldest son was recently diagnosis with an autism spectrum disorder. He also has ADHD. Some forms of autism spectrum disorders can be associated with specific genetic conditions, where we discussed that approximately 15-20% of individuals who have autism will have an identifiable genetic cause for this history.  However, most often these conditions are due to the combination of genes and environmental factors that can affect development.  Since there is a genetic component to autism spectrum disorders, the recurrence risk for other family members is higher among first-degree relatives of the individual with an autism spectrum disorder (full-siblings), and decreases " with distance in relationship to other second-degree relatives.      Rolanda's partner, Matt, is 34. He has unilateral renal agenesis.     Matt has two daughters from a previous relationship, ages three and seven. The pregnancy with his eldest daughter was complicated by a two vessel cord and dilated renal pelvis. She has congenital scoliosis, polydactyly, macroglossia, cavovarus, absent patellae, hemivertebrae, hip dysplasia, hypertelorism, chronic pyelonephrosis, and developmental delays. We discussed that this constellation of features is highly suspicious for a genetic syndrome, possibly a ciliopathy. These features could also be part of a field defect such as VACTERL/VATER, which may not have an underlying genetic etiology. We discussed the possibility of a single-gene disorder, which could be inherited in an autosomal dominant, autosomal recessive, mitochondrial, or (less likely) x-linked manner. We also discussed the possibility of a chromosomal difference or copy number variant. We also discussed the possibility that Matt's renal agenesis could be related to his daughter's presentation, though many cases of isolated unilateral renal agenesis are not genetic. Perhaps the highest risk for recurrence would be if Matt's daughter has an autosomal recessive condition, for which he would be a carrier. Matt's daughter has not undergone any genetic testing at this time, though there is a plan for her to undergo testing. I encouraged Matt to advocate for his daughter to have testing as this would be most informative to provide recurrence risk.    Otherwise, the reported family history is unremarkable for multiple miscarriages, stillbirths, birth defects, intellectual disabilities, known genetic conditions, and consanguinity.       CARRIER SCREENING   Expanded carrier screening is available to screen for autosomal recessive conditions and X-linked conditions in a large list of genes. Autosomal recessive conditions  happen when a mutation has been inherited from the egg and sperm and include conditions like cystic fibrosis, thalassemia, hearing loss, spinal muscular atrophy, and more. X-linked conditions happen when a mutation has been inherited from the egg and include conditions like fragile X syndrome. [place] screening was also reviewed. About [place] Screening    We discussed that expanded carrier screening is designed to identify carrier status for conditions that are primarily childhood or adolescent onset. Expanded carrier screening does not evaluate for adult-onset conditions such as hereditary cancer syndromes, dementia/ Alzheimer's disease, or cardiovascular disease risk factors. Additionally, expanded carrier screening is not comprehensive for all known genetic diseases or inherited conditions. It will not intentionally screen for autosomal dominant conditions. This is a screening test, and residual carrier status risk figures will be provided to the patient after results become available. Carrier screening is not meant to diagnose the patient with a condition, and generally carriers are asymptomatic. However, certain genes may confer increased risks for various health concerns in carriers (including, but not limited to: EDYTA, DMD, FMR1).    We discussed that carrier screening can have implications for other family members and that couples are encouraged to share positive results with siblings and other family members of reproductive age. Additionally, even if there is not a high reproductive risk for a condition, it is possible that carrier status can be passed on to future generations.    We reviewed that there is a law in place, the Genetic Information Nondiscrimination Act ([place]ARINE), that protects patients from discrimination by health insurance companies and employers based on their genetic information. [place] does not protect against discrimination by life insurance companies or disability insurance.    We  reviewed that carrier screening will report on variants classified by the lab as pathogenic or likely pathogenic. Although carrier status does not change over time, it is possible that a variant could be reclassified as more information about the variant is learned. If this occurs, the couple will be contacted and a new risk assessment will be provided.     Rolanda and Matt opted to proceed with comprehensive carrier screening for 569 conditions through [employer], including variable expressivity conditions. We discussed that we may identify a condition for which Matt is a carrier that has overlap with his daughter's features, however, there is no way to know that we are targeting the condition that she may have.        RISK ASSESSMENT FOR CHROMOSOME CONDITIONS   We explained that the risk for fetal chromosome abnormalities increases with maternal age. We discussed specific features of common chromosome abnormalities, including Down syndrome, trisomy 13, trisomy 18, and sex chromosome trisomies.      At age 33 at midtrimester, the risk to have a baby with Down syndrome is 1 in 274.     At age 33 at midtrimester, the risk to have a baby with any chromosome abnormality is 1 in 135.     Since Rolanda's pregnancy with trisomy 21 occurred prior to the age of 33, recurrence may be higher than in the general population. However, Rolanda had genetic screening earlier in this pregnancy. Their non-invasive prenatal test was screen negative or low risk for screened conditions     Non-invasive prenatal testing (NIPT) results    Maternal plasma cell-free DNA testing    Screens for fetal trisomy 21, trisomy 13, trisomy 18, and sex chromosome aneuploidy    First trimester ultrasound with nuchal translucency and nasal bone assessment was not performed in this pregnancy, to our knowledge.    Amada had a Panorama test earlier in pregnancy; we reviewed the results today, which are low risk.    The NIPT did include sex chromosome  aneuploidies and the result was low risk. The predicted sex is XY, which is typically male.    Given the accuracy of this test, these results greatly decrease the chance for certain fetal chromosome abnormalities    We discussed the limitations of normal NIPT results    Maternal serum AFP only to screen for open neural tube defects (after 15 weeks) is not yet available due to early gestation but could be done after 15 weeks.    GENETIC TESTING OPTIONS   Genetic testing during a pregnancy includes screening and diagnostic procedures.      Screening tests are non-invasive which means no risk to the pregnancy and includes ultrasounds and blood work. The benefits and limitations of screening were reviewed. Screening tests provide a risk assessment (chance) specific to the pregnancy for certain fetal chromosome abnormalities but cannot definitively diagnose or exclude a fetal chromosome abnormality. Follow-up genetic counseling and consideration of diagnostic testing is recommended with any abnormal screening result. Diagnostic testing during a pregnancy is more certain and can test for more conditions. However, the tests do have a risk of miscarriage that requires careful consideration. These tests can detect fetal chromosome abnormalities with greater than 99% certainty. Results can be compromised by maternal cell contamination or mosaicism and are limited by the resolution of current genetic testing technology.     There is no screening or diagnostic test that detects all forms of birth defects or intellectual disability.     We discussed the following screening options:   Non-invasive prenatal testing (NIPT)    Also called cell-free DNA screening because it detect chromosome fragments from the placenta in the pregnant person's blood    Can be done any time after 10 weeks gestation    Screens for trisomy 21, trisomy 18, trisomy 13, and sex chromosome aneuploidies. Can also include screening for 22q11.2 microdeletion  syndrome.    Cannot screen for open neural tube defects, maternal serum AFP after 15 weeks is recommended      We discussed the following ultrasound options:  Nuchal translucency (NT) ultrasound    Ultrasound between [id number] that includes nuchal translucency measurement and nasal bone assessments    Nuchal translucency refers to the space at the back of the neck where fluid builds up. This is a normal finding and there is only concern if there is more fluid than expected    Nasal bone refers to the small bone in the nose. There is concern for conditions like Down syndrome if the bone cannot be seen at all    This ultrasound can be done as part of first trimester screening, at the same time as another screen (NIPT), at the same time as a CVS, or if the patients does not want serum screening.    Markers on ultrasound detects about 70% of pregnancies with aneuploidy    Increased NT measurements can also increase the risk for a birth defect, like a heart defect    Comprehensive level II ultrasound (Fetal Anatomy Ultrasound)    Ultrasound done between 18-20 weeks gestation    Screens for major birth defects and markers for aneuploidy (like trisomy 21 and trisomy 18)    Includes assessment of the fetal growth, internal organs, placenta, and amniotic fluid      We discussed the following diagnostic options:   Chorionic villus sampling (CVS)    Invasive diagnostic procedure done between 10w0d and 13w6d    The procedure collects a small sample from the placenta for the purpose of chromosomal testing and/or other genetic testing    Diagnostic result; more than 99% sensitivity for fetal chromosome abnormalities    Cannot screen for open neural tube defects, maternal serum AFP after 15 weeks is recommended    Amniocentesis    Invasive diagnostic procedure done after 15 weeks gestation    The procedure collects a small sample of amniotic fluid for the purpose of chromosomal testing and/or other genetic testing    Diagnostic  result; more than 99% sensitivity for fetal chromosome abnormalities    Testing for AFP in the amniotic fluid can test for open neural tube defects        It was a pleasure to be involved with Amada s care. Face-to-face time of the meeting was 30 minutes.    Nicole Ventura GC, MS, [facility]  Certified and [place] Licensed Genetic Counselor  [facility]  Maternal Fetal Medicine  Office: [phone]  [facility]: [phone]   Fax: [phone]  [facility]

## 2023-06-13 ENCOUNTER — OFFICE VISIT (OUTPATIENT)
Dept: MATERNAL FETAL MEDICINE | Facility: CLINIC | Age: 33
End: 2023-06-13
Attending: STUDENT IN AN ORGANIZED HEALTH CARE EDUCATION/TRAINING PROGRAM
Payer: COMMERCIAL

## 2023-06-13 ENCOUNTER — MEDICAL CORRESPONDENCE (OUTPATIENT)
Dept: HEALTH INFORMATION MANAGEMENT | Facility: CLINIC | Age: 33
End: 2023-06-13

## 2023-06-13 ENCOUNTER — LAB (OUTPATIENT)
Dept: LAB | Facility: CLINIC | Age: 33
End: 2023-06-13
Attending: STUDENT IN AN ORGANIZED HEALTH CARE EDUCATION/TRAINING PROGRAM
Payer: COMMERCIAL

## 2023-06-13 ENCOUNTER — HOSPITAL ENCOUNTER (OUTPATIENT)
Dept: ULTRASOUND IMAGING | Facility: CLINIC | Age: 33
Discharge: HOME | End: 2023-06-13
Attending: STUDENT IN AN ORGANIZED HEALTH CARE EDUCATION/TRAINING PROGRAM
Payer: COMMERCIAL

## 2023-06-13 VITALS
OXYGEN SATURATION: 100 % | DIASTOLIC BLOOD PRESSURE: 68 MMHG | SYSTOLIC BLOOD PRESSURE: 104 MMHG | RESPIRATION RATE: 18 BRPM | HEART RATE: 80 BPM

## 2023-06-13 DIAGNOSIS — O26.90 PREGNANCY RELATED CONDITION, ANTEPARTUM: ICD-10-CM

## 2023-06-13 DIAGNOSIS — Z31.438 ENCOUNTER FOR OTHER GENETIC TESTING OF FEMALE FOR PROCREATIVE MANAGEMENT: ICD-10-CM

## 2023-06-13 DIAGNOSIS — O09.292 HISTORY OF MISCARRIAGE, CURRENTLY PREGNANT, SECOND TRIMESTER: Primary | ICD-10-CM

## 2023-06-13 DIAGNOSIS — Z31.438 ENCOUNTER FOR OTHER GENETIC TESTING OF FEMALE FOR PROCREATIVE MANAGEMENT: Primary | ICD-10-CM

## 2023-06-13 PROBLEM — O22.21: Status: RESOLVED | Noted: 2018-11-24 | Resolved: 2023-06-13

## 2023-06-13 PROBLEM — O03.9 MISCARRIAGE: Status: RESOLVED | Noted: 2022-12-19 | Resolved: 2023-06-13

## 2023-06-13 PROBLEM — O03.4 INCOMPLETE MISCARRIAGE: Status: RESOLVED | Noted: 2022-12-19 | Resolved: 2023-06-13

## 2023-06-13 PROCEDURE — G0463 HOSPITAL OUTPT CLINIC VISIT: HCPCS | Mod: 25 | Performed by: OBSTETRICS & GYNECOLOGY

## 2023-06-13 PROCEDURE — 96040 HC GENETIC COUNSELING, EACH 30 MINUTES: CPT | Performed by: GENETIC COUNSELOR, MS

## 2023-06-13 PROCEDURE — 36415 COLL VENOUS BLD VENIPUNCTURE: CPT

## 2023-06-13 PROCEDURE — 76801 OB US < 14 WKS SINGLE FETUS: CPT

## 2023-06-13 PROCEDURE — 99215 OFFICE O/P EST HI 40 MIN: CPT | Mod: 25 | Performed by: OBSTETRICS & GYNECOLOGY

## 2023-06-13 PROCEDURE — 76801 OB US < 14 WKS SINGLE FETUS: CPT | Mod: 26 | Performed by: OBSTETRICS & GYNECOLOGY

## 2023-06-13 NOTE — PROGRESS NOTES
Maternal-Fetal Medicine Consultation    Amdaa Melo  : 1990  MRN: [id number]    Rerferral:  Amada Melo is a 32 year old sent by Dr. Orozco from [facility] for MFM consultation.    HPI:  Amada Melo is a 32 year old  at 13w0d by early US here for MFM consultation regarding history of pregnancy affected by Trisomy 21, as well as 15 week miscarriage requiring D&C. She is here with her son and her partner.     The patient reports that she has history of a pregnancy affected by Trisomy 21 then had a healthy pregnancy with normal genetics from a CVS. She had a CVS in her next pregnancy with normal genetic studies, but suffered a loss with a hemorrhage requiring D&C at 15 weeks. She is very nervous about this happening again in this pregnancy. She wonders if her partner's methotrexate use for psoriasis will negatively affect this pregnancy. Also, her partner's child with another partner had multiple anomalies and is now 7 years old with some learning difficulties. Anomalies reported were large tongue, wide set eyes, absent patella, club foot, scoliosis, hip dysplasia, hemivertebrae, recurrent UTIs and dilated renal ureters. This child has not had genetic testing. Moreover, the father of this pregnancy just recently learned he has a congenital solitary kidney. They have not completed carrier screening, but would like to discuss this today.    She states that she was previously on Paxil, but it made her feel numb, even to the good things. She was on Celexa with no benefit and now would like to try to stay off medications. She doesn't have a therapist and feels extra appointments right now wouldn't be helpful.     Prenatal Care:  Primary OB care this pregnancy has been with Dr. Orozco from [facility].    OB History    Para Term  AB Living   5 2 2 0 2 2   SAB IAB Ectopic Multiple Live Births   0 2 0 0 2      # Outcome Date GA Lbr Nhan/2nd Weight Sex Delivery Anes  PTL Lv   5 Current            4 IAB 12/18/22 14w4d  0.022 kg (0.8 oz) F Vag-Spont   FD      Birth Comments: head circumference n/a      Complications: Hemorrhage      Name: Ginette      Apgar1: 0  Apgar5: 0   3 Term 04/03/19 38w6d 29:25 / 00:13 3.46 kg (7 lb 10.1 oz) M  EPI N PHOEBE      Name: CATALINA DYE-BRONSON      Apgar1: 8  Apgar5: 9   2 IAB 02/01/18    F IAB         Birth Comments: T21      Name: Milli   1 Term 08/02/10 40w0d  3.742 kg (8 lb 4 oz) M Vag-Spont   PHOEBE      Birth Comments: AP:n; IN:n; 1 degree perneal lac      Name: Gaurang      Apgar1: 8  Apgar5: 9     Gynecologic History:  - Denies any history of abnormal pap smears  - Denies prior cervical surgery or procedures  - Denies any history of frequent UTIs, vaginal infections, or STIs    Past Medical History:  Past Medical History:   Diagnosis Date     ADD (attention deficit disorder)      Genital herpes      PTSD (post-traumatic stress disorder)      Uncomplicated asthma      Past Surgical History:  Past Surgical History:   Procedure Laterality Date     DILATION AND CURETTAGE SUCTION N/A 12/18/2022    Procedure: DILATION AND CURETTAGE, UTERUS, USING SUCTION;  Surgeon: Alicia Mota MD;  Location: [facility] OR     ENT SURGERY       PELVIC EXAMINATION UNDER ANESTHESIA N/A 12/18/2022    Procedure: EXAM UNDER ANESTHESIA, GYNECOLOGIC;  Surgeon: Alicia Mota MD;  Location: [facility] OR     TONSILLECTOMY       WISDOM TOOTH EXTRACTION       Current Medications:  Current Outpatient Medications   Medication     albuterol (PROAIR HFA, PROVENTIL HFA, VENTOLIN HFA) 108 (90 BASE) MCG/ACT inhaler     hydrOXYzine (ATARAX) 25 MG tablet     metoclopramide (REGLAN) 10 MG tablet     Prenatal Vit-Fe Fumarate-FA (PRENATAL MULTIVITAMIN W/IRON) 27-0.8 MG tablet     ALPRAZolam (XANAX) 1 MG tablet     cefdinir (OMNICEF) 300 MG capsule     fexofenadine (ALLEGRA) 60 MG tablet     No current facility-administered medications for this visit.      Allergies:  Amoxicillin-pot clavulanate, Adhesive tape, Nickel, Penicillins, and Steri strips    Social History:   Denies use of alcohol, drugs or smoking.    Family History:  Denies history of preeclampsia, thromboembolic disease, bleeding disorders, developmental delay.    ROS:  10-point ROS negative except as in HPI.    Physical Exam:  /68 (BP Location: Left arm, Patient Position: Sitting, Cuff Size: Adult Large)   Pulse 80   Resp 18   SpO2 100%   Gen: NAD  CV: Warm and well perfused  Resp: Breathing comfortably  Abd: Gravid, non-tender  Ext: No edema    Assessment/Plan:  Amada Melo is a 32 year old  at 13w0d by early US here for [facility] consultation regarding history of pregnancy affected by Trisomy 21 with 15 week loss in a later pregnancy, as well as paternal methotrexate exposure at the time of conception.    We reviewed that it is difficult to determine what caused her last loss and expressed our condolences. Discussed that even when genetic studies are normal, this is not exhaustive and there are other reasons for miscarriage. This may not mean that she is higher risk for that to recur, especially since she has had other healthy pregnancies. Her ultrasound today shows normal NT and she had low risk NIPT. Briefly discussed carrier screening and they are interested in meeting with genetics today for this. One test we can offer that was not previously completed is testing for APS, which was ordered today. Reviewed that this would  with regards to anticoagulation recommendations (she does not require anticoagulation for prior superficial venous thombosis).    As for her partner and his daughter, Veronica, we cannot necessarily point to a certain diagnosis without genetic testing. However, many conditions could also be sporadic and not necessarily incur risk to this current pregnancy. We did offer her an early anatomic survey with our office at 16 weeks and will also perform  a comprehensive anatomy scan at 20 weeks as well.     We reassured her that the paternal methotrexate exposure is unlikely to have any negative effects on this pregnancy.    Lastly, we discussed mental health. Reviewed that many antidepressants and other psychiatric medications are safe in pregnancy when weighing risks and benefits of untreated mental illness and theoretical risks of medications. At this time, she declines any medication or referral for  behavioral health, but was reassured this is an option and a referral we would be happy to place if she felt it would be helpful.    The patient was seen and evaluated with Dr. Wyatt.    Thank you for allowing us to participate in the care of your patient. Please do not hesitate to contact us if you have further questions regarding the management of your patient.     Laney Rebollar MD  Maternal Fetal Medicine Fellow        [facility] Attending Attestation  I have seen and evaluated the patient with Dr. Rebollar. I reviewed her chart and agree with the above documented assessment and plan. I spent a total of 45 minutes on the date of this encounter including preparing to see the patient (reviewing medical records/tests), counseling and discussing the plan of care, documenting the visit in the electronic medical record, and communicating with other health care professionals and/or care coordination.Please see her note for specific details; I have made the necessary edits/additions.  The patient was also seen for an ultrasound in the Maternal-Fetal Medicine Center at the [facility] today.  For a detailed report of the ultrasound examination, please see the ultrasound report which can be found under the imaging tab.  Nitza Wyatt MD  Maternal Fetal Medicine Physician

## 2023-06-13 NOTE — Clinical Note
Thank you for sending Ms. Melo to see us in clinic - please see attached for full details from today's visit.

## 2023-06-13 NOTE — NURSING NOTE
Pt here for GC/NT/Consult. D/t pt arrival time, GC deferred until after consult. Pt ultimately elected to complete GC. Pt with questions about GORDOB methotrexate exposure periconception, history of 14 wk loss after 46XX CVS results. Pt relates previously unknown history about STEVE's daughter Veronica (with other partner). Veronica was diagnosed at birth with large tongue, wide set eyes, absent patella, club foot, scoliosis, hip dysplasia, hemivertebrae, recurrent UTIs and dilated renal ureters. She is now 7, and just completed 1st grade with some learning difficulties but overall doing well per FOB's report. Pt also notes that STEVE has memory issues, but does not elaborate on a formal diagnosis, and was born with a single kidney. Pt also notes autism and ADHD on her familial side. Older son was born with a palatal tooth.     VSS. Pt seen by Dr. Wyatt. Plan for 2/3 and L2.

## 2023-06-19 ENCOUNTER — HOSPITAL ENCOUNTER (EMERGENCY)
Facility: HOSPITAL | Age: 33
Discharge: HOME | End: 2023-06-19
Attending: EMERGENCY MEDICINE | Admitting: EMERGENCY MEDICINE
Payer: COMMERCIAL

## 2023-06-19 VITALS
SYSTOLIC BLOOD PRESSURE: 121 MMHG | DIASTOLIC BLOOD PRESSURE: 66 MMHG | HEART RATE: 75 BPM | OXYGEN SATURATION: 100 % | RESPIRATION RATE: 16 BRPM | BODY MASS INDEX: 35.36 KG/M2 | TEMPERATURE: 97.4 F | WEIGHT: 253.53 LBS

## 2023-06-19 DIAGNOSIS — O21.9 NAUSEA AND VOMITING IN PREGNANCY: ICD-10-CM

## 2023-06-19 LAB
ALBUMIN SERPL BCG-MCNC: 3.8 G/DL (ref 3.5–5.2)
ALBUMIN UR-MCNC: NEGATIVE MG/DL
ALP SERPL-CCNC: 58 U/L (ref 35–104)
ALT SERPL W P-5'-P-CCNC: 9 U/L (ref 0–50)
AMORPH CRY #/AREA URNS HPF: ABNORMAL /HPF
ANION GAP SERPL CALCULATED.3IONS-SCNC: 10 MMOL/L (ref 7–15)
APPEARANCE UR: CLEAR
AST SERPL W P-5'-P-CCNC: 16 U/L (ref 0–45)
BASOPHILS # BLD AUTO: 0 10E3/UL (ref 0–0.2)
BASOPHILS NFR BLD AUTO: 0 %
BILIRUB SERPL-MCNC: <0.2 MG/DL
BILIRUB UR QL STRIP: NEGATIVE
BUN SERPL-MCNC: 6.2 MG/DL (ref 6–20)
CALCIUM SERPL-MCNC: 9.5 MG/DL (ref 8.6–10)
CHLORIDE SERPL-SCNC: 105 MMOL/L (ref 98–107)
COLOR UR AUTO: ABNORMAL
CREAT SERPL-MCNC: 0.54 MG/DL (ref 0.51–0.95)
DEPRECATED HCO3 PLAS-SCNC: 23 MMOL/L (ref 22–29)
EOSINOPHIL # BLD AUTO: 0.1 10E3/UL (ref 0–0.7)
EOSINOPHIL NFR BLD AUTO: 2 %
ERYTHROCYTE [DISTWIDTH] IN BLOOD BY AUTOMATED COUNT: 12.7 % (ref 10–15)
GFR SERPL CREATININE-BSD FRML MDRD: >90 ML/MIN/1.73M2
GLUCOSE SERPL-MCNC: 80 MG/DL (ref 70–99)
GLUCOSE UR STRIP-MCNC: NEGATIVE MG/DL
HCT VFR BLD AUTO: 34.9 % (ref 35–47)
HGB BLD-MCNC: 12.1 G/DL (ref 11.7–15.7)
HGB UR QL STRIP: NEGATIVE
IMM GRANULOCYTES # BLD: 0 10E3/UL
IMM GRANULOCYTES NFR BLD: 0 %
KETONES UR STRIP-MCNC: NEGATIVE MG/DL
LEUKOCYTE ESTERASE UR QL STRIP: NEGATIVE
LIPASE SERPL-CCNC: 16 U/L (ref 13–60)
LYMPHOCYTES # BLD AUTO: 1.8 10E3/UL (ref 0.8–5.3)
LYMPHOCYTES NFR BLD AUTO: 30 %
MCH RBC QN AUTO: 30.6 PG (ref 26.5–33)
MCHC RBC AUTO-ENTMCNC: 34.7 G/DL (ref 31.5–36.5)
MCV RBC AUTO: 88 FL (ref 78–100)
MONOCYTES # BLD AUTO: 0.6 10E3/UL (ref 0–1.3)
MONOCYTES NFR BLD AUTO: 10 %
MUCOUS THREADS #/AREA URNS LPF: PRESENT /LPF
NEUTROPHILS # BLD AUTO: 3.5 10E3/UL (ref 1.6–8.3)
NEUTROPHILS NFR BLD AUTO: 58 %
NITRATE UR QL: NEGATIVE
NRBC # BLD AUTO: 0 10E3/UL
NRBC BLD AUTO-RTO: 0 /100
PH UR STRIP: 6 [PH] (ref 5–7)
PLATELET # BLD AUTO: 153 10E3/UL (ref 150–450)
POTASSIUM SERPL-SCNC: 3.7 MMOL/L (ref 3.4–5.3)
PROT SERPL-MCNC: 6.7 G/DL (ref 6.4–8.3)
RBC # BLD AUTO: 3.96 10E6/UL (ref 3.8–5.2)
RBC URINE: 1 /HPF
SODIUM SERPL-SCNC: 138 MMOL/L (ref 136–145)
SP GR UR STRIP: 1.02 (ref 1–1.03)
SQUAMOUS EPITHELIAL: 2 /HPF
UROBILINOGEN UR STRIP-MCNC: <2 MG/DL
WBC # BLD AUTO: 6 10E3/UL (ref 4–11)
WBC URINE: 2 /HPF

## 2023-06-19 PROCEDURE — 83690 ASSAY OF LIPASE: CPT | Performed by: EMERGENCY MEDICINE

## 2023-06-19 PROCEDURE — 81001 URINALYSIS AUTO W/SCOPE: CPT | Performed by: EMERGENCY MEDICINE

## 2023-06-19 PROCEDURE — 258N000003 HC RX IP 258 OP 636: Performed by: EMERGENCY MEDICINE

## 2023-06-19 PROCEDURE — 36415 COLL VENOUS BLD VENIPUNCTURE: CPT | Performed by: EMERGENCY MEDICINE

## 2023-06-19 PROCEDURE — 99284 EMERGENCY DEPT VISIT MOD MDM: CPT | Mod: 25

## 2023-06-19 PROCEDURE — 80053 COMPREHEN METABOLIC PANEL: CPT | Performed by: EMERGENCY MEDICINE

## 2023-06-19 PROCEDURE — 85025 COMPLETE CBC W/AUTO DIFF WBC: CPT | Performed by: EMERGENCY MEDICINE

## 2023-06-19 PROCEDURE — 96374 THER/PROPH/DIAG INJ IV PUSH: CPT

## 2023-06-19 PROCEDURE — 250N000011 HC RX IP 250 OP 636: Performed by: EMERGENCY MEDICINE

## 2023-06-19 PROCEDURE — 96361 HYDRATE IV INFUSION ADD-ON: CPT

## 2023-06-19 RX ORDER — ONDANSETRON 2 MG/ML
4 INJECTION INTRAMUSCULAR; INTRAVENOUS ONCE
Status: COMPLETED | OUTPATIENT
Start: 2023-06-19 | End: 2023-06-19

## 2023-06-19 RX ORDER — SODIUM CHLORIDE 9 MG/ML
INJECTION, SOLUTION INTRAVENOUS CONTINUOUS
Status: DISCONTINUED | OUTPATIENT
Start: 2023-06-19 | End: 2023-06-20 | Stop reason: HOSPADM

## 2023-06-19 RX ADMIN — SODIUM CHLORIDE: 9 INJECTION, SOLUTION INTRAVENOUS at 21:15

## 2023-06-19 RX ADMIN — ONDANSETRON 4 MG: 2 INJECTION INTRAMUSCULAR; INTRAVENOUS at 21:29

## 2023-06-19 RX ADMIN — SODIUM CHLORIDE 1000 ML: 9 INJECTION, SOLUTION INTRAVENOUS at 20:15

## 2023-06-19 ASSESSMENT — ACTIVITIES OF DAILY LIVING (ADL): ADLS_ACUITY_SCORE: 35

## 2023-06-19 NOTE — ED TRIAGE NOTES
Pt is 14 weeks pregnant and has hyperemesis. Pt states she feels hung over, has major food aversions, and unable to drink water. Pt reports she is here for fluids.      Triage Assessment     Row Name 06/19/23 2910       Triage Assessment (Adult)    Airway WDL WDL       Respiratory WDL    Respiratory WDL WDL       Skin Circulation/Temperature WDL    Skin Circulation/Temperature WDL WDL       Cardiac WDL    Cardiac WDL WDL       Peripheral/Neurovascular WDL    Peripheral Neurovascular WDL WDL       Cognitive/Neuro/Behavioral WDL    Cognitive/Neuro/Behavioral WDL WDL

## 2023-06-20 NOTE — ED PROVIDER NOTES
EMERGENCY DEPARTMENT ENCOUNTER      NAME: Amada Melo  AGE: 32 year old female  YOB: 1990  MRN: [id number]  EVALUATION DATE & TIME: 2023  7:54 PM    PCP: Gretchen Orozco    ED PROVIDER: Danita Crews MD      Chief Complaint   Patient presents with     Emesis During Pregnancy         FINAL IMPRESSION:  1. Nausea and vomiting in pregnancy          ED COURSE & MEDICAL DECISION MAKING:    Pertinent Labs & Imaging studies reviewed. (See chart for details)    9:19 PM I introduced myself to the patient, obtained patient history, performed a physical exam, and discussed plan for ED workup including potential diagnostic laboratory/imaging studies and interventions.  9:54 PM Reevaluated and updated the patient with findings. We discussed the plan for discharge and the patient is agreeable. Reviewed supportive cares, symptomatic treatment, outpatient follow up, and reasons to return to the Emergency Department. Patient to be discharged by ED RN.     32 year old  female who is currently 14 weeks pregnant with history of anxiety who presents to this ED for evaluation of vomiting.  Patient has struggled throughout her current pregnancy with nausea and vomiting.  She does admit that she is able to eat just not as much as usual.  She did come in today specifically wanting some IV fluids.  She has discussed with her OB/GYN about possible home infusions.  She is prescribed Reglan and Zofran but has not taken them over the past couple of days.  She denies any other concerns with the pregnancy.  Not having current abdominal pain or cramping or contractions.  No vaginal discharge, bleeding, or loss of fluid.  Thus do not feel she warrants imaging of the pregnancy today.  Specifically is just here requesting IV fluids.  Did obtain laboratory studies to evaluate for signs of hyperemesis that may require admission however overall she is well-appearing and in no acute distress.  Vital signs are within  normal limits.  She was given a liter of IV fluids and continued on maintenance fluids 125 mL/h.  She was given 4 mg of IV Zofran.  Laboratory studies obtained.  Abdominal exam is benign without any tenderness.  Urinalysis unremarkable.  CBC with white blood count of 6 with a hemoglobin 12.1.  CMP completely within normal limits.  Normal creatinine.  Normal electrolytes.  Lipase within normal limits.  She was able to eat here without difficulty or vomiting.  With the reassuring blood work and her ability to tolerate p.o., do feel that she is appropriate for discharge.  Do not see any reason that she requires admission at this point and she is in agreement.  Did discuss with her that she should follow-up closely with her OB/GYN to discuss potential home infusions to avoid ED visits if possible.  However did also discuss indications for return to ER.  She voiced understanding and was comfortable with this plan.  She was discharged home in stable condition.         At the conclusion of the encounter I discussed the results of all of the tests and the disposition. The questions were answered. The patient or family acknowledged understanding and was agreeable with the care plan.      Medical Decision Making    History:    Supplemental history from: Documented in chart, if applicable    External Record(s) reviewed: Documented in chart, if applicable.    Work Up:    Chart documentation includes differential considered and any EKGs or imaging independently interpreted by provider.    In additional to work up documented, I considered the following work up: Documented in chart, if applicable.    External consultation:    Discussion of management with another provider: Documented in chart, if applicable    Complicating factors:    Care impacted by chronic illness: Mental Health and Other: Asthma    Care affected by social determinants of health: N/A    Disposition considerations: Discharge. No recommendations on prescription  "strength medication(s). See documentation for any additional details.      MEDICATIONS GIVEN IN THE EMERGENCY:  Medications   0.9% sodium chloride BOLUS (0 mLs Intravenous Stopped 23)   ondansetron (ZOFRAN) injection 4 mg (4 mg Intravenous $Given 23)       NEW PRESCRIPTIONS STARTED AT TODAY'S ER VISIT  Discharge Medication List as of 2023 10:15 PM             =================================================================    HPI    Patient information was obtained from: patient    Use of : N/A         Amada Melo is a 32 year old  female who is currently 14 weeks pregnant with history of anxiety who presents to this ED for evaluation of vomiting.    Patient is currently pregnant with her 6th pregnancy. Patient reports persistent nausea and vomiting that has been ongoing for the whole pregnancy. She says that everything triggers her nausea and makes her \"unable to drink, eat, or sleep.\" However, states she still is eating just not as much. She says she \"feels hung over\" and dehydrated. She denies alcohol or drug use. She talked with her OBGYN recently who suggested home IV infusions with home health care but it hasn't been established yet. She hasn't been taking the prescribed Reglan and zofran (last dose was a few days ago). She notes since her last ED visit, she feels like symptoms are a little better. She states that she came to the ER today for fluids.     She denies current abdominal pain. She denies associating vaginal bleeding, vaginal discharge, chest pain, shortness of breath, fever, cough, diarrhea, and contractions. No loss of fluid.     There were no other concerns/complaints at this time.      REVIEW OF SYSTEMS   Review of Systems   Pertinent positives and negatives are documented in the HPI. All other systems reviewed and are negative.      PAST MEDICAL HISTORY:  Past Medical History:   Diagnosis Date     ADD (attention deficit disorder)      Genital " herpes      Incomplete miscarriage 12/19/2022     Miscarriage 12/19/2022     PTSD (post-traumatic stress disorder)      Superficial thrombophlebitis during pregnancy in first trimester 11/24/2018    Formatting of this note might be different from the original. Consult Dr. Manuel; patient completed 45 days Lovenox.     Uncomplicated asthma        PAST SURGICAL HISTORY:  Past Surgical History:   Procedure Laterality Date     DILATION AND CURETTAGE SUCTION N/A 12/18/2022    Procedure: DILATION AND CURETTAGE, UTERUS, USING SUCTION;  Surgeon: Alicia Mota MD;  Location: [facility] OR     ENT SURGERY       PELVIC EXAMINATION UNDER ANESTHESIA N/A 12/18/2022    Procedure: EXAM UNDER ANESTHESIA, GYNECOLOGIC;  Surgeon: Alicia Mota MD;  Location: [facility] OR     TONSILLECTOMY       WISDOM TOOTH EXTRACTION             CURRENT MEDICATIONS:    albuterol (PROAIR HFA, PROVENTIL HFA, VENTOLIN HFA) 108 (90 BASE) MCG/ACT inhaler  ALPRAZolam (XANAX) 1 MG tablet  cefdinir (OMNICEF) 300 MG capsule  fexofenadine (ALLEGRA) 60 MG tablet  hydrOXYzine (ATARAX) 25 MG tablet  metoclopramide (REGLAN) 10 MG tablet  Prenatal Vit-Fe Fumarate-FA (PRENATAL MULTIVITAMIN W/IRON) 27-0.8 MG tablet        ALLERGIES:  Allergies   Allergen Reactions     Amoxicillin-Pot Clavulanate Hives     Adhesive Tape      Other reaction(s): Contact Dermatitis  blisters  Chemical burn like reaction to steri strips       Nickel      Skin irritation      Penicillins Hives     Steri Strips      blister       FAMILY HISTORY:  Family History   Problem Relation Age of Onset     Depression Mother      Depression Father      Asthma Mother      Cervical Cancer Mother      Cerebrovascular Disease Maternal Grandmother      Hypercalcemia Paternal Grandmother      Hypertension Paternal Grandmother        SOCIAL HISTORY:   Social History     Socioeconomic History     Marital status: Legally      Spouse name: seperated for 4 years      "Number of children: 1   Occupational History     Occupation: unemployed     Employer: [employer]OMEM[employer]   Tobacco Use     Smoking status: Former     Packs/day: 0.50     Types: Cigarettes     Quit date: 2018     Years since quittin.8     Smokeless tobacco: Never     Tobacco comments:     5 cigs per day---also does e-cig   Substance and Sexual Activity     Alcohol use: Yes     Alcohol/week: 0.0 standard drinks of alcohol     Comment: Alcoholic Drinks/day: \"sometimes\" - not in pregnancy     Drug use: No     Sexual activity: Not Currently       VITALS:  /66   Pulse 75   Temp 97.4  F (36.3  C) (Temporal)   Resp 16   Wt 115 kg (253 lb 8.5 oz)   SpO2 100%   BMI 35.36 kg/m      PHYSICAL EXAM    Physical Exam  Constitutional: Well developed, Well nourished, NAD, GCS 15  HENT: Normocephalic, Atraumatic, Bilateral external ears normal, Oropharynx normal, mucous membranes moist, Nose normal. Neck- Normal range of motion, No tenderness, Supple, No stridor.   Eyes: PERRL, EOMI, Conjunctiva normal, No discharge.   Respiratory: Normal breath sounds, No respiratory distress, No wheezing or crackles, Speaks in full sentences easily.  Cardiovascular: Normal heart rate, Regular rhythm, No murmurs, No rubs, No gallops. 2+ radial pulses bilaterally  GI: Bowel sounds normal, Soft, No tenderness, No masses, No rebound or guarding. No CVA tenderness bilaterally   Musculoskeletal: 2+ DP pulses. No notable lower extremity edema. Good range of motion in all major joints. No major deformities noted.    Integument: Warm, Dry, No erythema, No rash. No petechiae.   Neurologic: Alert & oriented x 3, 5/5 strength in all 4 extremities bilaterally. Sensation intact to light touch in all 4 extremities and the face bilaterally. No focal deficits noted.   Psychiatric: Affect normal, Judgment normal, Mood normal. Cooperative.      LAB:  All pertinent labs reviewed and interpreted.  Results for orders placed or performed during the hospital " encounter of 06/19/23   Comprehensive metabolic panel   Result Value Ref Range    Sodium 138 136 - 145 mmol/L    Potassium 3.7 3.4 - 5.3 mmol/L    Chloride 105 98 - 107 mmol/L    Carbon Dioxide (CO2) 23 22 - 29 mmol/L    Anion Gap 10 7 - 15 mmol/L    Urea Nitrogen 6.2 6.0 - 20.0 mg/dL    Creatinine 0.54 0.51 - 0.95 mg/dL    Calcium 9.5 8.6 - 10.0 mg/dL    Glucose 80 70 - 99 mg/dL    Alkaline Phosphatase 58 35 - 104 U/L    AST 16 0 - 45 U/L    ALT 9 0 - 50 U/L    Protein Total 6.7 6.4 - 8.3 g/dL    Albumin 3.8 3.5 - 5.2 g/dL    Bilirubin Total <0.2 <=1.2 mg/dL    GFR Estimate >90 >60 mL/min/1.73m2   Result Value Ref Range    Lipase 16 13 - 60 U/L   UA with Microscopic reflex to Culture    Specimen: Urine, Midstream   Result Value Ref Range    Color Urine Light Yellow Colorless, Straw, Light Yellow, Yellow    Appearance Urine Clear Clear    Glucose Urine Negative Negative mg/dL    Bilirubin Urine Negative Negative    Ketones Urine Negative Negative mg/dL    Specific Gravity Urine 1.024 1.001 - 1.030    Blood Urine Negative Negative    pH Urine 6.0 5.0 - 7.0    Protein Albumin Urine Negative Negative mg/dL    Urobilinogen Urine <2.0 <2.0 mg/dL    Nitrite Urine Negative Negative    Leukocyte Esterase Urine Negative Negative    Mucus Urine Present (A) None Seen /LPF    Amorphous Crystals Urine Few (A) None Seen /HPF    RBC Urine 1 <=2 /HPF    WBC Urine 2 <=5 /HPF    Squamous Epithelials Urine 2 (H) <=1 /HPF   CBC with platelets and differential   Result Value Ref Range    WBC Count 6.0 4.0 - 11.0 10e3/uL    RBC Count 3.96 3.80 - 5.20 10e6/uL    Hemoglobin 12.1 11.7 - 15.7 g/dL    Hematocrit 34.9 (L) 35.0 - 47.0 %    MCV 88 78 - 100 fL    MCH 30.6 26.5 - 33.0 pg    MCHC 34.7 31.5 - 36.5 g/dL    RDW 12.7 10.0 - 15.0 %    Platelet Count 153 150 - 450 10e3/uL    % Neutrophils 58 %    % Lymphocytes 30 %    % Monocytes 10 %    % Eosinophils 2 %    % Basophils 0 %    % Immature Granulocytes 0 %    NRBCs per 100 WBC 0 <1 /100     Absolute Neutrophils 3.5 1.6 - 8.3 10e3/uL    Absolute Lymphocytes 1.8 0.8 - 5.3 10e3/uL    Absolute Monocytes 0.6 0.0 - 1.3 10e3/uL    Absolute Eosinophils 0.1 0.0 - 0.7 10e3/uL    Absolute Basophils 0.0 0.0 - 0.2 10e3/uL    Absolute Immature Granulocytes 0.0 <=0.4 10e3/uL    Absolute NRBCs 0.0 10e3/uL       RADIOLOGY:  Reviewed all pertinent imaging. Please see official radiology report.  No orders to display         TimePoints System Documentation:   CMS Diagnoses:               I, Whitney Royce, am serving as a scribe to document services personally performed by Danita Crews MD based on my observation and the provider's statements to me. I, Danita Crews MD, attest that Whitney Crane is acting in a scribe capacity, has observed my performance of the services and has documented them in accordance with my direction.    Danita Crews MD  [facility] EMERGENCY DEPARTMENT  [address] [place] [id number] [phone]     Danita Crews MD  07/13/23 0185

## 2023-06-20 NOTE — DISCHARGE INSTRUCTIONS
Follow-up with your OB/GYN as discussed for possible home IV fluids.  Continue taking your antiemetics at home.  Eat small meals.    Return to the ER for any new or worsening concerns.

## 2023-06-20 NOTE — ED NOTES
Pt discharged at 2215 ambulatory to home. All questions answered. Pt expressed understanding of info

## 2023-06-26 ENCOUNTER — TELEPHONE (OUTPATIENT)
Dept: MATERNAL FETAL MEDICINE | Facility: HOSPITAL | Age: 33
End: 2023-06-26
Payer: COMMERCIAL

## 2023-06-26 LAB — SCANNED LAB RESULT: ABNORMAL

## 2023-06-26 NOTE — TELEPHONE ENCOUNTER
June 26, 2023    I called mAada to discuss the results of her and her partner, Matt's, carrier screening.     The couple did not screen mutually positive for any of the 558 childhood onset conditions assessed. They did screen mutually positive for one condition with high variability. See discussion below. Amada did not screen positive for any of the x-linked conditions. This significantly reduces the couple's reproductive risk for this group of conditions.    Amada screened positive for one condition:  Hereditary hemochromatosis 1 (HFE: c.845G>A):    This condition causes the body to absorb too much iron from the diet which can lead to tissue and organ damage.     Early symptoms are nonspecific and can include join pain, abdominal pain, and fatigue. Later symptoms include arthritis, skin discoloration, liver disease, diabetes, and heart disease. Prognosis depends on extent of organ damage and various lifestyle factors such as amount of iron in the diet and alcohol use.    This is a low penetrance variant and some individuals with this variant and another pathogenic variant have no signs or symptoms of the condition.     Matt is also a carrier of this condition, indicating a 1 in 4 reproductive risk for their children to inherit both variants. However, both variants they harbor are low penetrance variants and many individuals with this combination of variants do not exhibit symptoms.       Matt screened positive for six conditions:  Hereditary hemochromatosis 1 (HFE: c.187C>G):    This is a low penetrance variant and some individuals with this variant and another pathogenic variant have no signs or symptoms of the condition.     Alpha-1 antitrypsin deficiency (SERPINA1: c.1096G>A aka PiZ):     This condition impacts a protein produced in the liver that protects the lungs from damage. It is a highly variable condition and symptoms can present anytime from infancy through adulthood.     Severely affected  individuals typically develop lung disease between the ages of 20 and 50. Affected individuals can also develop severe liver disease, which can also increase the risk for liver cancer.     Generally speaking, carriers should not have symptoms of the condition, though they may have a slightly increased risk for lung or liver disease, which can be more severe with exposures (such as heavy drinking or smoking).    Since Rolanda was NOT identified to be a carrier of this condition, the residual reproductive risk is 1 in 4800.    Alport syndrome (LGU7Z6-dzdgzjf) (c.1327_1344del):    Alport syndrome can be caused by mutations in several different genes with differing inheritance patterns. CZT6U5-kpvlmdt Alport syndrome is an autosomal recessive condition (MEG). However, carriers can be at risk for the autosomal dominant form.     MEG is characterized by kidney concerns, hearing loss, and eye abnormalities. Most individuals present with kidney disease and bilateral high-frequency hearing loss. End-stage renal disease is typical before the age of 30. Lesions in the eyes may begin to appear in the 20s and 30s, but do not typically cause vision loss.     Severity can vary, even among individuals of the same family.     Autosomal dominant Alport syndrome (ADAS), is also characterized by kidney concerns and hearing problems. Symptoms typically present in the 40s.     Since Rolanda was NOT identified to be a carrier of this condition, the residual risk is 1 in 140,800.    Since Matt is at risk for ADAS, I recommended that Matt meet with nephrology to establish care.    Mitochondrial complex IV deficiency 6 (COX15: c.452C>G)    This condition is part of a group of conditions in which the energy-producing structures of the cells (mitochondria) do not function properly.     Symptoms can be variable, but can begin in utero with fetal growth restriction. Other symptoms include low muscle tone, failure to thrive, hypertrophic  cardiomyopathy, brain dysfunction, and lactic acidosis.     Not all individuals who have the condition will have all of the symptoms, though lifespan is usually shortened due to the condition with death occurring in childhood.     Since Rolanda was NOT identified to be a carrier of this condition, the couple's residual reproductive risk is 1 in 199,600.    EWU63-veejmwm conditions (c.806_810del):    This group of conditions includes include autosomal recessive Da congenital amaurosis (LCA), retinitis pigmentosa (RP), cone-summer dystrophy (CRD), macular dystrophy, and autosomal dominant retinitis pigmentosa. These conditions are all associated with vision loss. This variant is expected to be associated with autosomal recessive disease.    LCA causes significant vision loss in infancy or early childhood which is usually stable but can be progressive. Sometimes individuals with this condition have a characteristic behavior called oculo-digital sign, which consists of eye poking, rubbing, and pressing. Some individuals with LCA may have intellectual disability.     RP typically presents with night blindness in childhood or adolescence.Vision loss is progressive and eventually causes tunnel vision and then central vision loss. Most individuals with RP are legally blind by adulthood.     CRB typically presents with farsightedness or nearsightedness with loss of color perception, light sensitivity, and night blindness. Most individuals with CRD are legally blind by mid-adulthood.     Macular dystrophy causes loss of central vision with variable age of onset and severity.     This variant is most commonly associated with LCA.    Since Rolanda was NOT identified to be a carrier of this condition, the couple's residual reproductive risk is 1 in 183,600.    [id number] conditions (c.835C>T)    Biallelic mutations in this gene cause several different conditions called sulfate transporter-related osteochondrodysplasias. These  "include atelosteogenesis type 2 (AO2), achondrogenesis type 1B (ACG1B), diastrophic dysplasia (DTD), and multiple epiphyseal dysplasia type 4 (EDM4).    Symptoms of AO2 and ACG1B are severe, including extremely short arms and legs, a narrow chest, and a rounded abdomen. These conditions are generally lethal or life-limiting and most affected individuals are stillborn.     Symptoms of DTD can be as severe as AO2 or ACG1B or can be more mild. A defining feature is \"hitchhiker thumbs\", referring to atypical positioning of the thumb. In carmen severe cases, stillbirth or early death is common. However, there are individuals with DTD that can live into adulthood.     EDM4 is the most mild of these conditions. Symptoms can include joint pain, abnormalities of the hands, feet, knees, and spine. Height can be wtihin the normal range and affected individuals usually live into adulthood.     This variant is most commonly associated with EDM4 when in the homozygous state and with DTD or AO2 when in the compound heterozygous state with a more \"severe\" variant.     Since Rolanda was NOT identified to be a carrier of this condition, the couple's residual reproductive risk is 1 in 12,560.    Of note, there is significant overlap between this condition and Matt's daughter's presentation.       Any of the couple's children will have a 50% chance of being carriers of these conditions. This is also true for the couple's siblings. In the case of hemochromatosis, this is a 25% chance to inherit both variants. Other family members could also be at risk to be carriers and the couple was encouraged to share this information with their families.     Additional results to note:    Neville were identified to carry pseuodeficiency alleles. Pseudodeficiency alleles are NOT associated with carrier status or disease, but can exhibit false positive results on biochemical tests such as  screening.      Nicole Ventura MS, " [facility]  Licensed Genetic Counselor  [facility]  Maternal Fetal Medicine  [email or web address]  [phone]

## 2023-07-10 ENCOUNTER — ANCILLARY PROCEDURE (OUTPATIENT)
Dept: ULTRASOUND IMAGING | Facility: HOSPITAL | Age: 33
End: 2023-07-10
Attending: STUDENT IN AN ORGANIZED HEALTH CARE EDUCATION/TRAINING PROGRAM
Payer: COMMERCIAL

## 2023-07-10 ENCOUNTER — OFFICE VISIT (OUTPATIENT)
Dept: MATERNAL FETAL MEDICINE | Facility: HOSPITAL | Age: 33
End: 2023-07-10
Attending: STUDENT IN AN ORGANIZED HEALTH CARE EDUCATION/TRAINING PROGRAM
Payer: COMMERCIAL

## 2023-07-10 DIAGNOSIS — O09.292 HISTORY OF MISCARRIAGE, CURRENTLY PREGNANT, SECOND TRIMESTER: ICD-10-CM

## 2023-07-10 DIAGNOSIS — O09.292 HISTORY OF MISCARRIAGE, CURRENTLY PREGNANT, SECOND TRIMESTER: Primary | ICD-10-CM

## 2023-07-10 PROCEDURE — 76805 OB US >/= 14 WKS SNGL FETUS: CPT

## 2023-07-10 PROCEDURE — 99207 PR NO CHARGE LOS: CPT | Performed by: STUDENT IN AN ORGANIZED HEALTH CARE EDUCATION/TRAINING PROGRAM

## 2023-07-10 PROCEDURE — 76805 OB US >/= 14 WKS SNGL FETUS: CPT | Mod: 26 | Performed by: STUDENT IN AN ORGANIZED HEALTH CARE EDUCATION/TRAINING PROGRAM

## 2023-07-10 NOTE — PROGRESS NOTES
Please see the full imaging report from the ViewPoint program under the imaging tab.    Malena Huang MD  Maternal Fetal Medicine

## 2023-07-10 NOTE — NURSING NOTE
Amada Melo is a  at 16w6d who presents to [facility] for scheduled early anatomy ultrasound.  SBAR given to Dr. Huang, see note in Epic.

## 2023-07-14 ENCOUNTER — LAB REQUISITION (OUTPATIENT)
Dept: LAB | Facility: CLINIC | Age: 33
End: 2023-07-14
Payer: COMMERCIAL

## 2023-07-14 DIAGNOSIS — Z3A.17 17 WEEKS GESTATION OF PREGNANCY: ICD-10-CM

## 2023-07-14 PROCEDURE — 82105 ALPHA-FETOPROTEIN SERUM: CPT | Performed by: OBSTETRICS & GYNECOLOGY

## 2023-07-17 LAB
# FETUSES US: NORMAL
AFP MOM SERPL: 0.85
AFP SERPL-MCNC: 25 NG/ML
AGE - REPORTED: 35 YR
CURRENT SMOKER: NO
FAMILY MEMBER DISEASES HX: NORMAL
GA METHOD: NORMAL
GA: NORMAL WK
IDDM PATIENT QL: NORMAL
INTEGRATED SCN PATIENT-IMP: NORMAL
SPECIMEN DRAWN SERPL: NORMAL

## 2023-08-03 ENCOUNTER — TRANSFERRED RECORDS (OUTPATIENT)
Dept: MATERNAL FETAL MEDICINE | Facility: HOSPITAL | Age: 33
End: 2023-08-03
Payer: COMMERCIAL

## 2023-10-07 ENCOUNTER — HEALTH MAINTENANCE LETTER (OUTPATIENT)
Age: 33
End: 2023-10-07

## 2023-11-13 ENCOUNTER — HOSPITAL ENCOUNTER (OUTPATIENT)
Facility: HOSPITAL | Age: 33
Discharge: HOME | End: 2023-11-14
Attending: OBSTETRICS & GYNECOLOGY | Admitting: OBSTETRICS & GYNECOLOGY
Payer: COMMERCIAL

## 2023-11-13 ENCOUNTER — APPOINTMENT (OUTPATIENT)
Dept: ULTRASOUND IMAGING | Facility: HOSPITAL | Age: 33
End: 2023-11-13
Attending: OBSTETRICS & GYNECOLOGY
Payer: COMMERCIAL

## 2023-11-13 PROBLEM — Z36.89 ENCOUNTER FOR TRIAGE IN PREGNANT PATIENT: Status: ACTIVE | Noted: 2023-11-13

## 2023-11-13 LAB
ALBUMIN MFR UR ELPH: 11 MG/DL
ALBUMIN SERPL BCG-MCNC: 3.1 G/DL (ref 3.5–5.2)
ALP SERPL-CCNC: 134 U/L (ref 35–104)
ALT SERPL W P-5'-P-CCNC: 18 U/L (ref 0–50)
AMPHETAMINES UR QL SCN: ABNORMAL
ANION GAP SERPL CALCULATED.3IONS-SCNC: 5 MMOL/L (ref 7–15)
AST SERPL W P-5'-P-CCNC: 17 U/L (ref 0–45)
BARBITURATES UR QL SCN: ABNORMAL
BENZODIAZ UR QL SCN: ABNORMAL
BILIRUB SERPL-MCNC: <0.2 MG/DL
BUN SERPL-MCNC: 7.3 MG/DL (ref 6–20)
BZE UR QL SCN: ABNORMAL
CALCIUM SERPL-MCNC: 9.1 MG/DL (ref 8.6–10)
CANNABINOIDS UR QL SCN: ABNORMAL
CANNABINOIDS UR QL SCN: NORMAL
CHLORIDE SERPL-SCNC: 106 MMOL/L (ref 98–107)
CREAT SERPL-MCNC: 0.54 MG/DL (ref 0.51–0.95)
CREAT UR-MCNC: 98 MG/DL
CREAT UR-MCNC: 98.4 MG/DL
DEPRECATED HCO3 PLAS-SCNC: 26 MMOL/L (ref 22–29)
EGFRCR SERPLBLD CKD-EPI 2021: >90 ML/MIN/1.73M2
ERYTHROCYTE [DISTWIDTH] IN BLOOD BY AUTOMATED COUNT: 12.5 % (ref 10–15)
FENTANYL UR QL: ABNORMAL
GLUCOSE SERPL-MCNC: 82 MG/DL (ref 70–99)
HBA1C MFR BLD: 5.1 %
HCT VFR BLD AUTO: 35.3 % (ref 35–47)
HGB BLD-MCNC: 11.6 G/DL (ref 11.7–15.7)
MCH RBC QN AUTO: 30.4 PG (ref 26.5–33)
MCHC RBC AUTO-ENTMCNC: 32.9 G/DL (ref 31.5–36.5)
MCV RBC AUTO: 93 FL (ref 78–100)
OPIATES UR QL SCN: ABNORMAL
PCP QUAL URINE (ROCHE): ABNORMAL
PLATELET # BLD AUTO: 189 10E3/UL (ref 150–450)
POTASSIUM SERPL-SCNC: 4.3 MMOL/L (ref 3.4–5.3)
PROT SERPL-MCNC: 5.7 G/DL (ref 6.4–8.3)
PROT/CREAT 24H UR: 0.11 MG/MG CR (ref 0–0.2)
RBC # BLD AUTO: 3.81 10E6/UL (ref 3.8–5.2)
SODIUM SERPL-SCNC: 137 MMOL/L (ref 135–145)
WBC # BLD AUTO: 5.5 10E3/UL (ref 4–11)

## 2023-11-13 PROCEDURE — 80053 COMPREHEN METABOLIC PANEL: CPT | Performed by: OBSTETRICS & GYNECOLOGY

## 2023-11-13 PROCEDURE — 83036 HEMOGLOBIN GLYCOSYLATED A1C: CPT | Performed by: OBSTETRICS & GYNECOLOGY

## 2023-11-13 PROCEDURE — 258N000003 HC RX IP 258 OP 636: Performed by: OBSTETRICS & GYNECOLOGY

## 2023-11-13 PROCEDURE — 80307 DRUG TEST PRSMV CHEM ANLYZR: CPT | Performed by: OBSTETRICS & GYNECOLOGY

## 2023-11-13 PROCEDURE — 80359 METHYLENEDIOXYAMPHETAMINES: CPT | Performed by: OBSTETRICS & GYNECOLOGY

## 2023-11-13 PROCEDURE — 84156 ASSAY OF PROTEIN URINE: CPT | Performed by: OBSTETRICS & GYNECOLOGY

## 2023-11-13 PROCEDURE — 80325 AMPHETAMINES 3OR 4: CPT | Performed by: OBSTETRICS & GYNECOLOGY

## 2023-11-13 PROCEDURE — 87077 CULTURE AEROBIC IDENTIFY: CPT | Performed by: OBSTETRICS & GYNECOLOGY

## 2023-11-13 PROCEDURE — 87389 HIV-1 AG W/HIV-1&-2 AB AG IA: CPT | Performed by: OBSTETRICS & GYNECOLOGY

## 2023-11-13 PROCEDURE — 36415 COLL VENOUS BLD VENIPUNCTURE: CPT | Performed by: OBSTETRICS & GYNECOLOGY

## 2023-11-13 PROCEDURE — 80357 KETAMINE AND NORKETAMINE: CPT | Performed by: OBSTETRICS & GYNECOLOGY

## 2023-11-13 PROCEDURE — 96372 THER/PROPH/DIAG INJ SC/IM: CPT | Performed by: OBSTETRICS & GYNECOLOGY

## 2023-11-13 PROCEDURE — 86780 TREPONEMA PALLIDUM: CPT | Performed by: OBSTETRICS & GYNECOLOGY

## 2023-11-13 PROCEDURE — 250N000011 HC RX IP 250 OP 636: Performed by: OBSTETRICS & GYNECOLOGY

## 2023-11-13 PROCEDURE — 87653 STREP B DNA AMP PROBE: CPT | Performed by: OBSTETRICS & GYNECOLOGY

## 2023-11-13 PROCEDURE — 85027 COMPLETE CBC AUTOMATED: CPT | Performed by: OBSTETRICS & GYNECOLOGY

## 2023-11-13 PROCEDURE — 76816 OB US FOLLOW-UP PER FETUS: CPT

## 2023-11-13 PROCEDURE — 86803 HEPATITIS C AB TEST: CPT | Performed by: OBSTETRICS & GYNECOLOGY

## 2023-11-13 PROCEDURE — 87340 HEPATITIS B SURFACE AG IA: CPT | Performed by: OBSTETRICS & GYNECOLOGY

## 2023-11-13 PROCEDURE — 76819 FETAL BIOPHYS PROFIL W/O NST: CPT

## 2023-11-13 PROCEDURE — 250N000013 HC RX MED GY IP 250 OP 250 PS 637: Performed by: OBSTETRICS & GYNECOLOGY

## 2023-11-13 RX ORDER — LIDOCAINE 40 MG/G
CREAM TOPICAL
Status: DISCONTINUED | OUTPATIENT
Start: 2023-11-13 | End: 2023-11-14 | Stop reason: HOSPADM

## 2023-11-13 RX ORDER — SODIUM CHLORIDE, SODIUM LACTATE, POTASSIUM CHLORIDE, CALCIUM CHLORIDE 600; 310; 30; 20 MG/100ML; MG/100ML; MG/100ML; MG/100ML
INJECTION, SOLUTION INTRAVENOUS CONTINUOUS
Status: DISCONTINUED | OUTPATIENT
Start: 2023-11-13 | End: 2023-11-13

## 2023-11-13 RX ORDER — ONDANSETRON 4 MG/1
8 TABLET, ORALLY DISINTEGRATING ORAL EVERY 8 HOURS PRN
Status: ON HOLD | COMMUNITY
Start: 2023-05-25 | End: 2023-11-16

## 2023-11-13 RX ORDER — NIFEDIPINE 10 MG/1
10-20 CAPSULE ORAL
Status: DISCONTINUED | OUTPATIENT
Start: 2023-11-13 | End: 2023-11-14 | Stop reason: HOSPADM

## 2023-11-13 RX ORDER — DEXTROAMPHETAMINE SULFATE, DEXTROAMPHETAMINE SACCHARATE, AMPHETAMINE SULFATE AND AMPHETAMINE ASPARTATE 6.25; 6.25; 6.25; 6.25 MG/1; MG/1; MG/1; MG/1
25 CAPSULE, EXTENDED RELEASE ORAL DAILY
Status: ON HOLD | COMMUNITY
Start: 2023-03-07 | End: 2023-11-22

## 2023-11-13 RX ORDER — BETAMETHASONE SODIUM PHOSPHATE AND BETAMETHASONE ACETATE 3; 3 MG/ML; MG/ML
12 INJECTION, SUSPENSION INTRA-ARTICULAR; INTRALESIONAL; INTRAMUSCULAR; SOFT TISSUE EVERY 24 HOURS
Status: DISCONTINUED | OUTPATIENT
Start: 2023-11-13 | End: 2023-11-14 | Stop reason: HOSPADM

## 2023-11-13 RX ORDER — LABETALOL HYDROCHLORIDE 5 MG/ML
20 INJECTION, SOLUTION INTRAVENOUS
Status: DISCONTINUED | OUTPATIENT
Start: 2023-11-13 | End: 2023-11-14 | Stop reason: HOSPADM

## 2023-11-13 RX ORDER — VALACYCLOVIR HYDROCHLORIDE 500 MG/1
500 TABLET, FILM COATED ORAL DAILY
Status: DISCONTINUED | OUTPATIENT
Start: 2023-11-13 | End: 2023-11-14 | Stop reason: HOSPADM

## 2023-11-13 RX ORDER — SODIUM CHLORIDE, SODIUM LACTATE, POTASSIUM CHLORIDE, CALCIUM CHLORIDE 600; 310; 30; 20 MG/100ML; MG/100ML; MG/100ML; MG/100ML
10-125 INJECTION, SOLUTION INTRAVENOUS CONTINUOUS PRN
Status: DISCONTINUED | OUTPATIENT
Start: 2023-11-13 | End: 2023-11-14 | Stop reason: HOSPADM

## 2023-11-13 RX ORDER — SODIUM CHLORIDE, SODIUM LACTATE, POTASSIUM CHLORIDE, CALCIUM CHLORIDE 600; 310; 30; 20 MG/100ML; MG/100ML; MG/100ML; MG/100ML
10-125 INJECTION, SOLUTION INTRAVENOUS CONTINUOUS
Status: DISCONTINUED | OUTPATIENT
Start: 2023-11-13 | End: 2023-11-13

## 2023-11-13 RX ORDER — VALACYCLOVIR HYDROCHLORIDE 500 MG/1
500 TABLET, FILM COATED ORAL DAILY
Status: DISCONTINUED | OUTPATIENT
Start: 2023-11-14 | End: 2023-11-13

## 2023-11-13 RX ORDER — GABAPENTIN 300 MG/1
300 CAPSULE ORAL AT BEDTIME
COMMUNITY
Start: 2023-03-14 | End: 2023-11-27

## 2023-11-13 RX ORDER — ACETAMINOPHEN 325 MG/1
975 TABLET ORAL EVERY 6 HOURS PRN
Status: DISCONTINUED | OUTPATIENT
Start: 2023-11-13 | End: 2023-11-14 | Stop reason: HOSPADM

## 2023-11-13 RX ADMIN — VALACYCLOVIR HYDROCHLORIDE 500 MG: 500 TABLET, FILM COATED ORAL at 22:41

## 2023-11-13 RX ADMIN — NIFEDIPINE 10 MG: 10 CAPSULE ORAL at 17:56

## 2023-11-13 RX ADMIN — BETAMETHASONE ACETATE AND BETAMETHASONE SODIUM PHOSPHATE 12 MG: 3; 3 INJECTION, SUSPENSION INTRA-ARTICULAR; INTRALESIONAL; INTRAMUSCULAR; SOFT TISSUE at 22:08

## 2023-11-13 RX ADMIN — ACETAMINOPHEN 975 MG: 325 TABLET ORAL at 17:35

## 2023-11-13 RX ADMIN — SODIUM CHLORIDE, POTASSIUM CHLORIDE, SODIUM LACTATE AND CALCIUM CHLORIDE: 600; 310; 30; 20 INJECTION, SOLUTION INTRAVENOUS at 18:05

## 2023-11-13 ASSESSMENT — ACTIVITIES OF DAILY LIVING (ADL)
ADLS_ACUITY_SCORE: 20
ADLS_ACUITY_SCORE: 35
ADLS_ACUITY_SCORE: 20
ADLS_ACUITY_SCORE: 20

## 2023-11-14 ENCOUNTER — HOSPITAL ENCOUNTER (OUTPATIENT)
Facility: HOSPITAL | Age: 33
End: 2023-11-14
Admitting: OBSTETRICS & GYNECOLOGY
Payer: COMMERCIAL

## 2023-11-14 VITALS
WEIGHT: 267 LBS | HEART RATE: 91 BPM | HEIGHT: 71 IN | SYSTOLIC BLOOD PRESSURE: 122 MMHG | DIASTOLIC BLOOD PRESSURE: 68 MMHG | RESPIRATION RATE: 20 BRPM | BODY MASS INDEX: 37.38 KG/M2 | OXYGEN SATURATION: 98 % | TEMPERATURE: 98 F

## 2023-11-14 LAB
GP B STREP DNA SPEC QL NAA+PROBE: POSITIVE
HBV SURFACE AG SERPL QL IA: NONREACTIVE
HCV AB SERPL QL IA: NONREACTIVE
HIV 1+2 AB+HIV1 P24 AG SERPL QL IA: NONREACTIVE
T PALLIDUM AB SER QL: NONREACTIVE

## 2023-11-14 PROCEDURE — G0463 HOSPITAL OUTPT CLINIC VISIT: HCPCS

## 2023-11-14 ASSESSMENT — ACTIVITIES OF DAILY LIVING (ADL)
ADLS_ACUITY_SCORE: 20

## 2023-11-14 NOTE — CONSULTS
ASHLEY spoke with RN, unable to meet with Pt prior to discharge.       SW reviewed tox screen. Pt positive for amphetamines, per chart review Pt endorses taking adderal (prescription inactive due to lack of contact with prescribing MD). ASHLEY called and spoke with [facility] CPS Intake (ph: [phone] f: [phone]).  Intake reports report will be screened out and Pt will be assigned to [facility].  ASHLEY faxed report to [facility].       ERIC Tierney at 11:28 AM on 11/14/23

## 2023-11-14 NOTE — DISCHARGE INSTRUCTIONS
Discharge Instruction for Undelivered Patients      You were seen for:  Elevated Blood Pressure in Pregnancy  We Consulted: Dr. Orozco  You had (Test or Medicine):Labs, and a NST.     Diet:   Drink 8 to 12 glasses of liquids (milk, juice, water) every day.     Activity:  Call your doctor or nurse midwife if your baby is moving less than usual.     Call your provider if you notice:  Swelling in your face or increased swelling in your hands or legs.  Headaches that are not relieved by Tylenol (acetaminophen).  Changes in your vision (blurring: seeing spots or stars.)  Nausea (sick to your stomach) and vomiting (throwing up).   Weight gain of 5 pounds or more per week.  Heartburn that doesn't go away.  Signs of bladder infection: pain when you urinate (use the toilet), need to go more often and more urgently.  The bag of mario (rupture of membranes) breaks, or you notice leaking in your underwear.  Bright red blood in your underwear.  Abdominal (lower belly) or stomach pain.  For first baby: Contractions (tightening) less than 5 minutes apart for one hour or more.  Second (plus) baby: Contractions (tightening) less than 10 minutes apart and getting stronger.  *If less than 34 weeks: Contractions (tightening) more than 6 times in one hour.  Increase or change in vaginal discharge (note the color and amount)  Other: Per Dr. Orozco schedule weekly appointments for the remainder of the pregnancy, starting early next week.    Follow-up:  As scheduled in the clinic

## 2023-11-14 NOTE — PROGRESS NOTES
Informed Dr. Orozco of pt status. Orders to do NST and discharge home. Advise pt per Dr. Orozco to schedule weekly appointments for the remainder of the pregnancy.

## 2023-11-14 NOTE — PROGRESS NOTES
Patient given Betamethasone shot and oral Valtrex.  Plan of care reviewed with patient.  Patient reported redness in legs, noted in thighs above compression socks, she reported it started after getting oral Nifedipine.  Dr Jacobson notified.  No rash noted or itching.  Temperature normal.      Patient sleeping.  BP hourly at this time times 4, then vitals every 4 hours.  No more severe range BP after oral nifedipine.

## 2023-11-14 NOTE — H&P
OB HISTORY AND PHYSICAL UPDATE ADMISSION EXAM    Amada Dye  1990  [id number]    HPI: 33 yo  at 34+6 weeks presents to the hospital with elevated blood pressures. She has not been seen in clinic for the past 4 months. She called today with complaints of high blood pressure and swelling. She has a complicated social history.     Estimated Date of Delivery: Dec 19, 2023                       EGA 34w6d    OB History    Para Term  AB Living   5 2 2 0 2 2   SAB IAB Ectopic Multiple Live Births   0 2 0 0 2      # Outcome Date GA Lbr Nhan/2nd Weight Sex Delivery Anes PTL Lv   5 Current            4 IAB 22 14w4d  0.022 kg (0.8 oz) F Vag-Spont   FD      Birth Comments: head circumference n/a      Complications: Hemorrhage      Name: Ginette      Apgar1: 0  Apgar5: 0   3 Term 19 38w6d 29:25 / 00:13 3.46 kg (7 lb 10.1 oz) M  EPI N PHOEBE      Name: CATALINA DYE-AMADA      Apgar1: 8  Apgar5: 9   2 IAB 18    F IAB         Birth Comments: T21      Name: Milli   1 Term 08/02/10 40w0d  3.742 kg (8 lb 4 oz) M Vag-Spont   PHOEBE      Birth Comments: AP:n; IN:n; 1 degree perneal lac      Name: Gaurang      Apgar1: 8  Apgar5: 9       Prenatal Complications:  Minimal PNC  Complicated Social Situation  Asthma  Genital Herpes  Anxiety    Exam:    BP (!) 162/95   Pulse 78   Temp 97.8  F (36.6  C) (Oral)   Resp 22         HEENT          WNL              Heart              WNL               Lungs             WNL                      Abdomen        WNL                       Extremities     WNL                     Vaginal exam  Pending      Fetal Status    Reassuring, no decelerations    Assessment:   33 yo  at 34+6 weeks by early US (good dating) who presents with elevated blood pressures and swelling. History complicated by social situation and lack of PNC.   Asthma, stable  Anxiety  Genital Herpes, no outbreak per patient, has not been on antivirals  GBS unknown    Plan:   Check  labs  IV access  Nifedipine now to bring down blood pressure  Check urine  US for growth and BPP and position  Most likely will move forward with induction of labor. I am awaiting all test results. I briefly d/w the patient and she is very nervous asking if we could wait. I will reevaluate when assessment is complete. I will continue to treat the blood pressures. Decision about induction and Magnesium pending test results.     Catalino Jacobson M.D.
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
## 2023-11-14 NOTE — PROGRESS NOTES
progress note    the patient is a G5P  who presented to labor and delivery at 34+6 weeks with elevated blood pressures     ASSESSMENT: PLAN:     Iup at 35+0   Minimal prenatal care  asthma  Gestational hypertension- pressures stable   S/p betamethasone- second dose today and will be discharged to home    Complicated social issues   Positive drug screen- amph positive but pt states taking aderall        Subjective:  No complaints.   Feeling good  Denies headache, visional problems or RUQ pain    Objective:    Vitals:    23 0007 23 0100 23 0416 23 0917   BP: 128/77 121/77 129/67 122/68   BP Location:    Left arm   Patient Position:    Right side   Cuff Size:    Adult Large   Pulse:    91   Resp:  18 18 20   Temp:  97.9  F (36.6  C) 97.9  F (36.6  C) 98  F (36.7  C)   TempSrc:  Oral Oral Oral   SpO2:    98%   Weight:       Height:           FHTS category 1  CONTRACTIONS:  rare   CERVIX: not checked  FLUID: note leaking any fldui    LABS: Group B Strep:  Negative  Hemoglobin   Date Value Ref Range Status   2023 11.6 (L) 11.7 - 15.7 g/dL Final   ]      Libby Blue MD  [facility]  [phone]

## 2023-11-14 NOTE — PLAN OF CARE
CaGoal Outcome Evaluation:      Plan of Care Reviewed With: patient    Overall Patient Progress: no changeOverall Patient Progress: no change         Reactive Nst   VSS

## 2023-11-14 NOTE — PROGRESS NOTES
Dr Jacobson updated on lab results.      Ultrasound called, will come to bedside to do ultrasound.

## 2023-11-14 NOTE — PROGRESS NOTES
NST reactive, verified with Pilar PUENTES RN.    This RN removed IV site, gave discharge instructions. Instructed pt to schedule weekly appts per Dr. Orozco.
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
## 2023-11-14 NOTE — PROGRESS NOTES
Received report and assumed care at 0710.  0830 pt asleep will perform a full assessment once awake.

## 2023-11-15 ENCOUNTER — HOSPITAL ENCOUNTER (OUTPATIENT)
Facility: HOSPITAL | Age: 33
Discharge: HOME | End: 2023-11-15
Attending: OBSTETRICS & GYNECOLOGY | Admitting: OBSTETRICS & GYNECOLOGY
Payer: COMMERCIAL

## 2023-11-15 VITALS — TEMPERATURE: 98.5 F | WEIGHT: 267 LBS | RESPIRATION RATE: 20 BRPM | BODY MASS INDEX: 37.38 KG/M2 | HEIGHT: 71 IN

## 2023-11-15 ASSESSMENT — ACTIVITIES OF DAILY LIVING (ADL)
ADLS_ACUITY_SCORE: 35
ADLS_ACUITY_SCORE: 35
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
## 2023-11-15 NOTE — PLAN OF CARE
Goal Outcome Evaluation:             Talked to Dr. Orozco regarding Rolanda.  She wanted to get her induced on 11/28/23 at 0700 patient not sure if that will work but I instructed her to call [facility]/Dr. Orozco office if she cannot come for induction.  I explained to her to call and make appointment next week.  I explained to patient when she should come to hospital if she has increase in swelling in lower extremities and hands, vision issues, headache and right abdomen pain and if blood pressures are 160/110 for two blood pressures. I explained to check her blood pressures 2 times daily.  She is going through a lot right now she is currently living with her aunt had to move out of her place unexpectedly.  She hasn't seen Dr. Orozco for a while notes from 11/13/2023 said 4 months.

## 2023-11-15 NOTE — DISCHARGE INSTRUCTIONS
Checking Your Blood Pressure at Home  During and after pregnancy  How do I measure my blood pressure?  It s important to take the readings at the same time each day, such as morning and evening. Take your blood pressure before taking any morning medications.  How to get the most accurate reading  30 minutes before checking your blood pressure, avoid the following:  Drinking caffeine  Drinking alcohol  Eating  Smoking  Exercising  5 minutes before checking your blood pressure:  Use the bathroom and urinate so you have an empty bladder.  Sit still in a chair for around 5 minutes. Stay calm and relaxed and do not talk if possible.  To check your blood pressure:     Sit up straight in a chair.  Place your feet on the floor. Don t cross your ankles or legs.  Rest your arm at the level of your heart on a table or desk or on the arm of a chair. Use the same arm every day.  Pull up your shirt sleeve. Don t take the measurement over clothes.  Wrap the blood pressure cuff around the upper part of your left arm, 1 inch (2.5 cm) above your elbow.  Fit the cuff snugly around your arm. You should be able to place only one finger between the cuff and your arm.  Position the cord so that it rests in the bend of your elbow.  Press the power button.  Sit quietly while the cuff inflates and deflates.  Read the digital reading on the monitor screen and write the numbers down (record them) in a notebook.  Wait 2-3 minutes, then repeat the steps, starting at step 1.  Which features do you need?  Arm cuff monitors give the most exact readings.  Wrist and finger blood pressure monitors are often less exact.  Pick a blood pressure monitor that has passed tests to show they measure exactly. Blood pressure cuffs for sale in the [place] that have passed tests are listed on the website www.validatebp.org.  Some monitors that have passed tests are:  Omron 3 Series Upper Arm Blood Pressure Monitor (Model VC3991)  Omron 5 Series Upper Arm Blood  "Pressure Monitor (Model II3048)  Omron 7 Series Upper Arm Blood Pressure Monitor (Model HEM-7320)  [employer] Upper Arm Blood Pressure Monitor with Talking Function (UA 1030T)  Don t use smartphone apps. There are many smartphone apps that claim to check your blood pressure using the pulse in your wrist or finger. These don t work. They haven t passed any tests. Don t give your clinic a blood pressure reading from a smartphone xiomara.  If you have a flexible spending account (FSA) or health savings account (HSA), you may wish to pay yourself back (reimburse) for the machine and cuff. A blood pressure monitor is an allowed over-the- counter (OTC) item to pay yourself back from these accounts.  Cuff size  The size of the arm cuff is a key feature. Make sure the cuff is the right size for your arm. If the cuff isn t the right size, readings will either be too high or low.  To know what size cuff to buy, measure the distance around your bicep (upper arm).  Use a flexible measuring tape or . Place the measuring tape group home between your armpit and elbow. Measure the distance around your arm in inches.  You may need to buy a cuff apart from the machine to get the right size.  Cuff sizes and arm measurements  Small adult: 22 to 26 cm (8.7 to 10.2 inches)  Adult: 27 to 34 cm (10.6 to 13.4 inches)  Large adult: 35 to 44 cm (13.8 to 17.3 inches)  Adult thigh: 45 to 52 cm (17.7 to 20.5 inches)    Copyright statement\" content=\"For informational purposes only. Not to replace the advice of your health care provider. Photo: ID [id number]   [employer]. Text copyright   2023 [facility]. All rights reserved. Clinically reviewed by [facility]. [employer] 140366 - REV 03/23.  Know Your Blood Pressure Numbers  For patients who've had a high blood pressure disorder of pregnancy  What to know about high blood pressure disorders of pregnancy  People who had high blood pressure " "during pregnancy may continue to have high blood pressure for up to 12 weeks after pregnancy. It can also raise your lifetime risk of chronic high blood pressure, heart disease and blood vessel disease. It is vital that you keep monitoring your blood pressure and taking steps to control it.   The general guidelines below are what your blood pressures mean.  A heart healthy lifestyle that includes blood pressure control can help reduce these risks. A good blood pressure goal is less than 130/80 mmHg long-term.  Talk to your provider about high blood pressure disorder of pregnancy and what this means for your lifelong health.   Know your numbers  Read \"Checking Your Blood Pressure at Home\" to learn the best way to take your blood pressure.  Refer to the next page for general guidelines about what your blood pressures mean and what to do about them. Follow these instructions unless your provider tells you something different.  For more resources, visit www.preeclampsia.org.  What to do if your blood pressure is high  Act right away if you have numbers in the yellow or red range--don't wait for a scheduled appointment.  When to call your provider  Regardless of your blood pressure, call your healthcare provider right away if you develop any of these symptoms:  Severe headache  Chest pain  Trouble breathing  Stomach pain  Changes in vision  Swelling in your hands and face.  Please say, \"I am having symptoms of high blood pressure. My provider told me to call and ask to be seen right away when I have these symptoms.\"  If you ARE pregnant OR   it has been less than 12 weeks since you delivered  Systolic pressure (top number) is....  Diastolic (bottom number) is.... Your blood pressure is....   160 or higher  or 110 or higher VERY HIGH. Check it again in 10 minutes, then contact your provider.   140 - 159  or 90 - 109 HIGH. Keep checking blood pressure 2 times a day. If your blood pressure is in this range for 2 readings, " contact your provider within 24 hours. We will discuss starting or increasing your blood pressure medicine.   100 - 139  and 60 - 89 NORMAL. Your blood pressure looks great!   Keep checking it 2 times a day.   Less than 100  or Less than 60 LOW. Check your blood pressure again in   10 minutes, then contact your provider. We may need to make changes to your blood pressure medicine.     Call your provider right away if you have these symptoms: a severe headache, vision changes, shortness of breath, chest pain, or right upper belly pain. Call even if your blood pressure is okay.  Call 9-1-1 if you feel the symptoms are severe and that it is an emergency.   If you are NOT pregnant OR   it has been more than 12 weeks since you delivered  Systolic pressure (top number) is....  Diastolic (bottom number) is.... Your blood pressure is....   Higher than 180 and/or Higher than 120 Hypertensive crisis. Call your doctor right away.   140 or higher or  90 or higher Hypertension Stage 2. Follow up with your provider.   130-139 or Less than 80 Hypertension Stage 1. Follow up with your provider.   120-129 and Less than 80 Elevated blood pressure (pre-hypertension). You are at higher risk of developing high blood pressure. Follow up with your provider.   Less than 120 and Less than 80 Normal.     Call your provider right away if you have these symptoms: a severe headache, vision changes, shortness of breath, chest pain, or right upper belly pain. Call even if your blood pressure is okay.  Call 9-1-1 if you feel the symptoms are severe and that it is an emergency.   For informational purposes only. Not to replace the advice of your health care provider. Copyright   2023 [employer] [facility]. All rights reserved. Clinically reviewed by [facility]. [employer] [id number] - 03/23.      Please call Dr. Valencia office/[facility] [phone] if you cannot make induction to have your baby on Tuesday, November 28  th at 0700.   would like to see you next week.  IF you get more swollen- hands/legs, headache, blurred or vision problems or right side abdominal pain or if there is any concerns for you or baby.  Discharge Instruction for Undelivered Patients      You were seen for: Fetal Assessment  We Consulted: Dr. Orozco  You had (Test or Medicine)     Diet:   Drink 8 to 12 glasses of liquids (milk, juice, water) every day.     Activity:  Call your doctor or nurse midwife if your baby is moving less than usual.     Call your provider if you notice:  Swelling in your face or increased swelling in your hands or legs.  Headaches that are not relieved by Tylenol (acetaminophen).  Changes in your vision (blurring: seeing spots or stars.)  Nausea (sick to your stomach) and vomiting (throwing up).   Weight gain of 5 pounds or more per week.  Heartburn that doesn't go away.  Signs of bladder infection: pain when you urinate (use the toilet), need to go more often and more urgently.  The bag of mario (rupture of membranes) breaks, or you notice leaking in your underwear.  Bright red blood in your underwear.  Abdominal (lower belly) or stomach pain.  For first baby: Contractions (tightening) less than 5 minutes apart for one hour or more.  Second (plus) baby: Contractions (tightening) less than 10 minutes apart and getting stronger.  *If less than 34 weeks: Contractions (tightening) more than 6 times in one hour.  Increase or change in vaginal discharge (note the color and amount)  Other: ***    Follow-up:  {OB DC INST NOT ADMITTED OTHER:[id number]}

## 2023-11-16 ENCOUNTER — HOSPITAL ENCOUNTER (INPATIENT)
Facility: HOSPITAL | Age: 33
LOS: 6 days | Discharge: HOME | End: 2023-11-23
Attending: OBSTETRICS & GYNECOLOGY | Admitting: OBSTETRICS & GYNECOLOGY
Payer: COMMERCIAL

## 2023-11-16 DIAGNOSIS — F41.1 GAD (GENERALIZED ANXIETY DISORDER): ICD-10-CM

## 2023-11-16 DIAGNOSIS — Z36.89 ENCOUNTER FOR TRIAGE IN PREGNANT PATIENT: Primary | ICD-10-CM

## 2023-11-16 DIAGNOSIS — F41.9 ANXIETY: ICD-10-CM

## 2023-11-16 DIAGNOSIS — F41.0 PANIC ATTACKS: ICD-10-CM

## 2023-11-16 LAB
ALBUMIN MFR UR ELPH: 27.6 MG/DL
ALBUMIN SERPL BCG-MCNC: 3.1 G/DL (ref 3.5–5.2)
ALP SERPL-CCNC: 121 U/L (ref 40–150)
ALT SERPL W P-5'-P-CCNC: 16 U/L (ref 0–50)
ANION GAP SERPL CALCULATED.3IONS-SCNC: 9 MMOL/L (ref 7–15)
AST SERPL W P-5'-P-CCNC: 10 U/L (ref 0–45)
BILIRUB SERPL-MCNC: <0.2 MG/DL
BUN SERPL-MCNC: 8.5 MG/DL (ref 6–20)
CALCIUM SERPL-MCNC: 8.9 MG/DL (ref 8.6–10)
CHLORIDE SERPL-SCNC: 104 MMOL/L (ref 98–107)
CREAT SERPL-MCNC: 0.46 MG/DL (ref 0.51–0.95)
CREAT UR-MCNC: 113.1 MG/DL
DEPRECATED HCO3 PLAS-SCNC: 21 MMOL/L (ref 22–29)
EGFRCR SERPLBLD CKD-EPI 2021: >90 ML/MIN/1.73M2
ERYTHROCYTE [DISTWIDTH] IN BLOOD BY AUTOMATED COUNT: 12.2 % (ref 10–15)
GLUCOSE SERPL-MCNC: 108 MG/DL (ref 70–99)
HCT VFR BLD AUTO: 32.9 % (ref 35–47)
HGB BLD-MCNC: 11 G/DL (ref 11.7–15.7)
MCH RBC QN AUTO: 30.7 PG (ref 26.5–33)
MCHC RBC AUTO-ENTMCNC: 33.4 G/DL (ref 31.5–36.5)
MCV RBC AUTO: 92 FL (ref 78–100)
PLATELET # BLD AUTO: 206 10E3/UL (ref 150–450)
POTASSIUM SERPL-SCNC: 4 MMOL/L (ref 3.4–5.3)
PROT SERPL-MCNC: 5.7 G/DL (ref 6.4–8.3)
PROT/CREAT 24H UR: 0.24 MG/MG CR (ref 0–0.2)
RBC # BLD AUTO: 3.58 10E6/UL (ref 3.8–5.2)
SODIUM SERPL-SCNC: 134 MMOL/L (ref 135–145)
WBC # BLD AUTO: 7.6 10E3/UL (ref 4–11)

## 2023-11-16 PROCEDURE — 84156 ASSAY OF PROTEIN URINE: CPT | Performed by: OBSTETRICS & GYNECOLOGY

## 2023-11-16 PROCEDURE — 85027 COMPLETE CBC AUTOMATED: CPT | Performed by: OBSTETRICS & GYNECOLOGY

## 2023-11-16 PROCEDURE — 36415 COLL VENOUS BLD VENIPUNCTURE: CPT | Performed by: OBSTETRICS & GYNECOLOGY

## 2023-11-16 PROCEDURE — 85041 AUTOMATED RBC COUNT: CPT | Performed by: OBSTETRICS & GYNECOLOGY

## 2023-11-16 PROCEDURE — 80053 COMPREHEN METABOLIC PANEL: CPT | Performed by: OBSTETRICS & GYNECOLOGY

## 2023-11-16 RX ORDER — HYDROXYZINE HYDROCHLORIDE 50 MG/1
100 TABLET, FILM COATED ORAL AT BEDTIME
Status: DISCONTINUED | OUTPATIENT
Start: 2023-11-16 | End: 2023-11-23 | Stop reason: HOSPADM

## 2023-11-16 RX ORDER — ACETAMINOPHEN 325 MG/1
650 TABLET ORAL EVERY 8 HOURS PRN
Status: DISCONTINUED | OUTPATIENT
Start: 2023-11-16 | End: 2023-11-23 | Stop reason: HOSPADM

## 2023-11-16 ASSESSMENT — ACTIVITIES OF DAILY LIVING (ADL)
ADLS_ACUITY_SCORE: 35
ADLS_ACUITY_SCORE: 20
ADLS_ACUITY_SCORE: 20

## 2023-11-17 PROBLEM — O14.10 SEVERE PRE-ECLAMPSIA: Status: ACTIVE | Noted: 2023-11-17

## 2023-11-17 LAB
ALBUMIN SERPL BCG-MCNC: 3.2 G/DL (ref 3.5–5.2)
ALP SERPL-CCNC: 118 U/L (ref 40–150)
ALT SERPL W P-5'-P-CCNC: 15 U/L (ref 0–50)
AMPHET UR CFM-MCNC: 840 NG/ML
AMPHET UR-MCNC: 690 NG/ML
AMPHET/CREAT UR: 857 NG/MG {CREAT}
ANION GAP SERPL CALCULATED.3IONS-SCNC: 9 MMOL/L (ref 7–15)
AST SERPL W P-5'-P-CCNC: 12 U/L (ref 0–45)
BILIRUB SERPL-MCNC: <0.2 MG/DL
BUN SERPL-MCNC: 7.9 MG/DL (ref 6–20)
CALCIUM SERPL-MCNC: 8.9 MG/DL (ref 8.6–10)
CHLORIDE SERPL-SCNC: 102 MMOL/L (ref 98–107)
CREAT SERPL-MCNC: 0.48 MG/DL (ref 0.51–0.95)
DEPRECATED HCO3 PLAS-SCNC: 21 MMOL/L (ref 22–29)
EGFRCR SERPLBLD CKD-EPI 2021: >90 ML/MIN/1.73M2
ERYTHROCYTE [DISTWIDTH] IN BLOOD BY AUTOMATED COUNT: 12.1 % (ref 10–15)
GABAPENTIN UR QL CFM: PRESENT
GLUCOSE SERPL-MCNC: 84 MG/DL (ref 70–99)
HCT VFR BLD AUTO: 35 % (ref 35–47)
HGB BLD-MCNC: 11.6 G/DL (ref 11.7–15.7)
MCH RBC QN AUTO: 30.3 PG (ref 26.5–33)
MCHC RBC AUTO-ENTMCNC: 33.1 G/DL (ref 31.5–36.5)
MCV RBC AUTO: 91 FL (ref 78–100)
MDA UR-MCNC: <200 NG/ML
MDEA UR-MCNC: <200 NG/ML
MDMA UR-MCNC: <200 NG/ML
METHAMPHET UR CFM-MCNC: 5220 NG/ML
METHAMPHET UR-MCNC: 4185 NG/ML
METHAMPHET/CREAT UR: 5327 NG/MG {CREAT}
PHENTERMINE UR CFM-MCNC: <200 NG/ML
PLATELET # BLD AUTO: 202 10E3/UL (ref 150–450)
POTASSIUM SERPL-SCNC: 4.2 MMOL/L (ref 3.4–5.3)
PROT SERPL-MCNC: 6 G/DL (ref 6.4–8.3)
RBC # BLD AUTO: 3.83 10E6/UL (ref 3.8–5.2)
SODIUM SERPL-SCNC: 132 MMOL/L (ref 135–145)
WBC # BLD AUTO: 8.4 10E3/UL (ref 4–11)

## 2023-11-17 PROCEDURE — 250N000011 HC RX IP 250 OP 636: Performed by: OBSTETRICS & GYNECOLOGY

## 2023-11-17 PROCEDURE — 258N000003 HC RX IP 258 OP 636: Performed by: OBSTETRICS & GYNECOLOGY

## 2023-11-17 PROCEDURE — 36415 COLL VENOUS BLD VENIPUNCTURE: CPT | Performed by: OBSTETRICS & GYNECOLOGY

## 2023-11-17 PROCEDURE — 120N000001 HC R&B MED SURG/OB

## 2023-11-17 PROCEDURE — 250N000013 HC RX MED GY IP 250 OP 250 PS 637: Performed by: OBSTETRICS & GYNECOLOGY

## 2023-11-17 PROCEDURE — 85027 COMPLETE CBC AUTOMATED: CPT | Performed by: OBSTETRICS & GYNECOLOGY

## 2023-11-17 PROCEDURE — 80053 COMPREHEN METABOLIC PANEL: CPT | Performed by: OBSTETRICS & GYNECOLOGY

## 2023-11-17 PROCEDURE — 250N000013 HC RX MED GY IP 250 OP 250 PS 637

## 2023-11-17 RX ORDER — MORPHINE SULFATE 10 MG/ML
10 INJECTION, SOLUTION INTRAMUSCULAR; INTRAVENOUS
Status: COMPLETED | OUTPATIENT
Start: 2023-11-17 | End: 2023-11-17

## 2023-11-17 RX ORDER — METHYLERGONOVINE MALEATE 0.2 MG/ML
200 INJECTION INTRAVENOUS
Status: DISCONTINUED | OUTPATIENT
Start: 2023-11-17 | End: 2023-11-19

## 2023-11-17 RX ORDER — NALOXONE HYDROCHLORIDE 0.4 MG/ML
0.4 INJECTION, SOLUTION INTRAMUSCULAR; INTRAVENOUS; SUBCUTANEOUS
Status: DISCONTINUED | OUTPATIENT
Start: 2023-11-17 | End: 2023-11-19

## 2023-11-17 RX ORDER — ONDANSETRON 2 MG/ML
4 INJECTION INTRAMUSCULAR; INTRAVENOUS EVERY 6 HOURS PRN
Status: DISCONTINUED | OUTPATIENT
Start: 2023-11-17 | End: 2023-11-19

## 2023-11-17 RX ORDER — MAGNESIUM SULFATE 4 G/50ML
4 INJECTION INTRAVENOUS ONCE
Status: COMPLETED | OUTPATIENT
Start: 2023-11-17 | End: 2023-11-17

## 2023-11-17 RX ORDER — CITRIC ACID/SODIUM CITRATE 334-500MG
30 SOLUTION, ORAL ORAL ONCE
Status: DISCONTINUED | OUTPATIENT
Start: 2023-11-17 | End: 2023-11-19

## 2023-11-17 RX ORDER — MAGNESIUM SULFATE HEPTAHYDRATE 40 MG/ML
2 INJECTION, SOLUTION INTRAVENOUS
Status: DISCONTINUED | OUTPATIENT
Start: 2023-11-17 | End: 2023-11-23 | Stop reason: HOSPADM

## 2023-11-17 RX ORDER — ACETAMINOPHEN 325 MG/1
650 TABLET ORAL EVERY 4 HOURS PRN
Status: DISCONTINUED | OUTPATIENT
Start: 2023-11-17 | End: 2023-11-19

## 2023-11-17 RX ORDER — OXYTOCIN/0.9 % SODIUM CHLORIDE 30/500 ML
340 PLASTIC BAG, INJECTION (ML) INTRAVENOUS CONTINUOUS PRN
Status: DISCONTINUED | OUTPATIENT
Start: 2023-11-17 | End: 2023-11-19

## 2023-11-17 RX ORDER — FENTANYL CITRATE 50 UG/ML
100 INJECTION, SOLUTION INTRAMUSCULAR; INTRAVENOUS
Status: DISCONTINUED | OUTPATIENT
Start: 2023-11-17 | End: 2023-11-19

## 2023-11-17 RX ORDER — NALOXONE HYDROCHLORIDE 0.4 MG/ML
0.2 INJECTION, SOLUTION INTRAMUSCULAR; INTRAVENOUS; SUBCUTANEOUS
Status: DISCONTINUED | OUTPATIENT
Start: 2023-11-17 | End: 2023-11-19

## 2023-11-17 RX ORDER — HYDROXYZINE HYDROCHLORIDE 50 MG/1
100 TABLET, FILM COATED ORAL
Status: DISCONTINUED | OUTPATIENT
Start: 2023-11-17 | End: 2023-11-19

## 2023-11-17 RX ORDER — KETOROLAC TROMETHAMINE 30 MG/ML
30 INJECTION, SOLUTION INTRAMUSCULAR; INTRAVENOUS
Status: DISCONTINUED | OUTPATIENT
Start: 2023-11-17 | End: 2023-11-19

## 2023-11-17 RX ORDER — OXYTOCIN 10 [USP'U]/ML
10 INJECTION, SOLUTION INTRAMUSCULAR; INTRAVENOUS
Status: DISCONTINUED | OUTPATIENT
Start: 2023-11-17 | End: 2023-11-19

## 2023-11-17 RX ORDER — METOCLOPRAMIDE 10 MG/1
10 TABLET ORAL EVERY 6 HOURS PRN
Status: DISCONTINUED | OUTPATIENT
Start: 2023-11-17 | End: 2023-11-19

## 2023-11-17 RX ORDER — MAGNESIUM SULFATE 4 G/50ML
4 INJECTION INTRAVENOUS
Status: DISCONTINUED | OUTPATIENT
Start: 2023-11-17 | End: 2023-11-23 | Stop reason: HOSPADM

## 2023-11-17 RX ORDER — SODIUM CHLORIDE, SODIUM LACTATE, POTASSIUM CHLORIDE, CALCIUM CHLORIDE 600; 310; 30; 20 MG/100ML; MG/100ML; MG/100ML; MG/100ML
10-125 INJECTION, SOLUTION INTRAVENOUS CONTINUOUS
Status: DISCONTINUED | OUTPATIENT
Start: 2023-11-17 | End: 2023-11-17

## 2023-11-17 RX ORDER — MISOPROSTOL 200 UG/1
800 TABLET ORAL
Status: DISCONTINUED | OUTPATIENT
Start: 2023-11-17 | End: 2023-11-19

## 2023-11-17 RX ORDER — CARBOPROST TROMETHAMINE 250 UG/ML
250 INJECTION, SOLUTION INTRAMUSCULAR
Status: DISCONTINUED | OUTPATIENT
Start: 2023-11-17 | End: 2023-11-19

## 2023-11-17 RX ORDER — OXYTOCIN/0.9 % SODIUM CHLORIDE 30/500 ML
100-340 PLASTIC BAG, INJECTION (ML) INTRAVENOUS CONTINUOUS PRN
Status: DISCONTINUED | OUTPATIENT
Start: 2023-11-17 | End: 2023-11-19

## 2023-11-17 RX ORDER — MISOPROSTOL 100 UG/1
25 TABLET ORAL
Status: COMPLETED | OUTPATIENT
Start: 2023-11-17 | End: 2023-11-19

## 2023-11-17 RX ORDER — SODIUM CHLORIDE, SODIUM LACTATE, POTASSIUM CHLORIDE, CALCIUM CHLORIDE 600; 310; 30; 20 MG/100ML; MG/100ML; MG/100ML; MG/100ML
10-125 INJECTION, SOLUTION INTRAVENOUS CONTINUOUS
Status: DISCONTINUED | OUTPATIENT
Start: 2023-11-17 | End: 2023-11-23 | Stop reason: HOSPADM

## 2023-11-17 RX ORDER — BETAMETHASONE SODIUM PHOSPHATE AND BETAMETHASONE ACETATE 3; 3 MG/ML; MG/ML
12 INJECTION, SUSPENSION INTRA-ARTICULAR; INTRALESIONAL; INTRAMUSCULAR; SOFT TISSUE ONCE
Status: COMPLETED | OUTPATIENT
Start: 2023-11-17 | End: 2023-11-17

## 2023-11-17 RX ORDER — MISOPROSTOL 200 UG/1
400 TABLET ORAL
Status: DISCONTINUED | OUTPATIENT
Start: 2023-11-17 | End: 2023-11-19

## 2023-11-17 RX ORDER — MAGNESIUM SULFATE IN WATER 40 MG/ML
2 INJECTION, SOLUTION INTRAVENOUS CONTINUOUS
Status: DISCONTINUED | OUTPATIENT
Start: 2023-11-17 | End: 2023-11-23 | Stop reason: HOSPADM

## 2023-11-17 RX ORDER — METOCLOPRAMIDE HYDROCHLORIDE 5 MG/ML
10 INJECTION INTRAMUSCULAR; INTRAVENOUS EVERY 6 HOURS PRN
Status: DISCONTINUED | OUTPATIENT
Start: 2023-11-17 | End: 2023-11-19

## 2023-11-17 RX ORDER — LIDOCAINE 40 MG/G
CREAM TOPICAL
Status: DISCONTINUED | OUTPATIENT
Start: 2023-11-17 | End: 2023-11-17 | Stop reason: HOSPADM

## 2023-11-17 RX ORDER — LIDOCAINE 40 MG/G
CREAM TOPICAL
Status: DISCONTINUED | OUTPATIENT
Start: 2023-11-17 | End: 2023-11-17

## 2023-11-17 RX ORDER — CITRIC ACID/SODIUM CITRATE 334-500MG
30 SOLUTION, ORAL ORAL
Status: DISCONTINUED | OUTPATIENT
Start: 2023-11-17 | End: 2023-11-19

## 2023-11-17 RX ORDER — LIDOCAINE 40 MG/G
CREAM TOPICAL
Status: DISCONTINUED | OUTPATIENT
Start: 2023-11-17 | End: 2023-11-23 | Stop reason: HOSPADM

## 2023-11-17 RX ORDER — NIFEDIPINE 10 MG/1
20 CAPSULE ORAL
Status: COMPLETED | OUTPATIENT
Start: 2023-11-17 | End: 2023-11-17

## 2023-11-17 RX ORDER — PROCHLORPERAZINE 25 MG
25 SUPPOSITORY, RECTAL RECTAL EVERY 12 HOURS PRN
Status: DISCONTINUED | OUTPATIENT
Start: 2023-11-17 | End: 2023-11-19

## 2023-11-17 RX ORDER — NIFEDIPINE 10 MG/1
CAPSULE ORAL
Status: COMPLETED
Start: 2023-11-17 | End: 2023-11-17

## 2023-11-17 RX ORDER — TERBUTALINE SULFATE 1 MG/ML
0.25 INJECTION, SOLUTION SUBCUTANEOUS
Status: DISCONTINUED | OUTPATIENT
Start: 2023-11-17 | End: 2023-11-19

## 2023-11-17 RX ORDER — IBUPROFEN 800 MG/1
800 TABLET, FILM COATED ORAL
Status: DISCONTINUED | OUTPATIENT
Start: 2023-11-17 | End: 2023-11-19

## 2023-11-17 RX ORDER — CALCIUM GLUCONATE 94 MG/ML
1 INJECTION, SOLUTION INTRAVENOUS
Status: DISCONTINUED | OUTPATIENT
Start: 2023-11-17 | End: 2023-11-23 | Stop reason: HOSPADM

## 2023-11-17 RX ORDER — LIDOCAINE 40 MG/G
CREAM TOPICAL
Status: DISCONTINUED | OUTPATIENT
Start: 2023-11-17 | End: 2023-11-19

## 2023-11-17 RX ORDER — ONDANSETRON 4 MG/1
4 TABLET, ORALLY DISINTEGRATING ORAL EVERY 6 HOURS PRN
Status: DISCONTINUED | OUTPATIENT
Start: 2023-11-17 | End: 2023-11-19

## 2023-11-17 RX ORDER — PROCHLORPERAZINE MALEATE 10 MG
10 TABLET ORAL EVERY 6 HOURS PRN
Status: DISCONTINUED | OUTPATIENT
Start: 2023-11-17 | End: 2023-11-19

## 2023-11-17 RX ADMIN — HYDROXYZINE HYDROCHLORIDE 100 MG: 50 TABLET, FILM COATED ORAL at 22:52

## 2023-11-17 RX ADMIN — ACETAMINOPHEN 650 MG: 325 TABLET ORAL at 03:33

## 2023-11-17 RX ADMIN — MORPHINE SULFATE 10 MG: 10 INJECTION, SOLUTION INTRAMUSCULAR; INTRAVENOUS at 22:53

## 2023-11-17 RX ADMIN — Medication 25 MCG: at 10:52

## 2023-11-17 RX ADMIN — MAGNESIUM SULFATE HEPTAHYDRATE 4 G: 80 INJECTION, SOLUTION INTRAVENOUS at 10:54

## 2023-11-17 RX ADMIN — Medication 25 MCG: at 15:47

## 2023-11-17 RX ADMIN — Medication 25 MCG: at 13:12

## 2023-11-17 RX ADMIN — SODIUM CHLORIDE, POTASSIUM CHLORIDE, SODIUM LACTATE AND CALCIUM CHLORIDE 75 ML/HR: 600; 310; 30; 20 INJECTION, SOLUTION INTRAVENOUS at 22:58

## 2023-11-17 RX ADMIN — SODIUM CHLORIDE, POTASSIUM CHLORIDE, SODIUM LACTATE AND CALCIUM CHLORIDE 25 ML/HR: 600; 310; 30; 20 INJECTION, SOLUTION INTRAVENOUS at 10:53

## 2023-11-17 RX ADMIN — MAGNESIUM SULFATE HEPTAHYDRATE 2 G/HR: 40 INJECTION, SOLUTION INTRAVENOUS at 11:33

## 2023-11-17 RX ADMIN — ACETAMINOPHEN 650 MG: 325 TABLET ORAL at 12:22

## 2023-11-17 RX ADMIN — NIFEDIPINE 20 MG: 10 CAPSULE ORAL at 04:17

## 2023-11-17 RX ADMIN — ACETAMINOPHEN 650 MG: 325 TABLET ORAL at 18:12

## 2023-11-17 RX ADMIN — BETAMETHASONE SODIUM PHOSPHATE AND BETAMETHASONE ACETATE 12 MG: 3; 3 INJECTION, SUSPENSION INTRA-ARTICULAR; INTRALESIONAL; INTRAMUSCULAR at 10:26

## 2023-11-17 RX ADMIN — HYDROXYZINE HYDROCHLORIDE 100 MG: 50 TABLET, FILM COATED ORAL at 03:33

## 2023-11-17 ASSESSMENT — ACTIVITIES OF DAILY LIVING (ADL)
ADLS_ACUITY_SCORE: 20

## 2023-11-17 NOTE — PLAN OF CARE
Problem: Adult Inpatient Plan of Care  Goal: Plan of Care Review  Description: The Plan of Care Review/Shift note should be completed every shift.  The Outcome Evaluation is a brief statement about your assessment that the patient is improving, declining, or no change.  This information will be displayed automatically on your shift  note.  Outcome: Progressing   Goal Outcome Evaluation:       Pt's blood pressures raging 150-135/90's since receiving the oral nifedipine. Pt continues to denied headache, visual and epigastric pain. Reflexes within normal limits, no clonus. +2 edema noted to lower extremities.   EFM: category one, no contractions traced or palpated.

## 2023-11-17 NOTE — PROVIDER NOTIFICATION
154/102 Abnormal    by Gab Lu RN at 11/17/23 0405   155/100 Abnormal    by Gab Lu RN at 11/17/23 0350   157/101 Abnormal    by Rashad Guan Less updated regarding's elevated blood pressures. Pt denies headache, visual changes and epigastric pain.   Order received one time 20 mg oral nifedipine, place IV and do continues fetal monitoring.

## 2023-11-17 NOTE — PROGRESS NOTES
"[facility] Labor and Delivery Progress Note    Amada Melo MRN# [id number]   Age: 32 year old YOB: 1990           Subjective:   Patient states she feels crummy. Endorses HA 5//10           Objective:   Patient Vitals for the past 24 hrs:   BP Temp Temp src Pulse Resp SpO2 Height Weight BMI (Calculated) Oximeter Heart Rate   11/17/23 0758 123/72 97.5  F (36.4  C) Oral 96 20 -- -- -- -- 96 bpm   11/17/23 0711 124/72 -- -- -- -- -- -- -- -- 100 bpm   11/17/23 0629 134/78 -- -- -- -- -- -- -- -- 93 bpm   11/17/23 0614 132/77 -- -- -- -- -- -- -- -- 93 bpm   11/17/23 0515 (!) 152/98 -- -- -- -- -- -- -- -- 93 bpm   11/17/23 0505 (!) 153/96 -- -- -- -- -- -- -- -- 75 bpm   11/17/23 0500 (!) 154/95 -- -- -- -- -- -- -- -- --   11/17/23 0455 (!) 150/91 -- -- -- -- -- -- -- -- 83 bpm   11/17/23 0445 (!) 146/86 -- -- -- -- -- -- -- -- 88 bpm   11/17/23 0440 139/83 -- -- -- -- -- -- -- -- 88 bpm   11/17/23 0430 (!) 159/94 -- -- -- -- -- -- -- -- 82 bpm   11/17/23 0405 (!) 154/102 -- -- -- -- -- -- -- -- 75 bpm   11/17/23 0350 (!) 155/100 -- -- -- -- -- -- -- -- 83 bpm   11/17/23 0335 (!) 157/101 98  F (36.7  C) Oral -- 20 98 % -- -- -- 83 bpm   11/17/23 0036 (!) 143/84 98  F (36.7  C) Oral -- 20 98 % -- -- -- 82 bpm   11/16/23 2332 (!) 151/87 98  F (36.7  C) Oral -- 18 99 % -- -- -- 94 bpm   11/16/23 2045 135/84 -- -- -- -- -- -- -- -- 90 bpm   11/16/23 2014 135/80 -- -- -- -- -- -- -- -- 85 bpm   11/16/23 1943 (!) 143/92 -- -- -- -- -- -- -- -- 92 bpm   11/16/23 1927 -- -- -- -- -- -- -- -- -- 109 bpm   11/16/23 1924 (!) 144/101 98.1  F (36.7  C) -- -- 22 98 % 1.803 m (5' 11\") 121.1 kg (267 lb) 37.24 --   11/16/23 1912 (!) 138/96 -- -- -- -- -- -- -- -- 107 bpm   11/16/23 1911 -- -- -- -- -- -- -- -- -- 111 bpm         Cervical Exam:  one cm/ 70% - 3  Membranes:       Fetal Heart Rate:    Monitor: external US    Variability: moderate (amplitude range 6 to 25 bpm)    Baseline Rate: normal range "         Assessment:   Amada Melo is a 32 year old  who is 35w3d here with pre-eclampsia with severe features based on CNS symptoms, labile BPs          Plan:   Complete second dose of betamethasone  Begin ripening with cytotec  Treat Bps if severe range  Start magnesium for seizure protection  Update given to Dr. Ernesto RAMIREZ consult  GBS pos- start antibiotics in active labor or with PPROM      Alicia Mota MD

## 2023-11-17 NOTE — PROGRESS NOTES
Data: [facility] presented to [facility]: 2023  7:02 PM.  Reason for maternal/fetal assessment is hypertension disorder of pregnancy. Patient reports blood pressure has been elevated at home and has been experiencing intermittent headache rating headache pain 5/10.   Patient denies leaking of vaginal fluid/rupture of membranes, vaginal bleeding, abdominal pain, pelvic pressure, nausea, vomiting, visual disturbances, epigastric or RUQ pain. Patient reports fetal movement is normal. Patient is a 35w2d .  Prenatal record reviewed. Pregnancy has been complicated by complicated social situations, asthma, and anxiety .    Vital signs  blood pressure slightly elevated, 144-138/ . Support person is present.   EFM: moderate variability with accelerations, no decelerations. Baseline 155.  [place]: no contractions traced or palpating.  Action: Verbal consent for EFM. Triage assessment completed.     Response: Patient verbalized agreement with plan.   Dr. Dennis updated regarding pt's arrival, chief complaint, blood pressures, fetal tracing and ctx. Orders received for HELLP labs, results pending.

## 2023-11-17 NOTE — CONSULTS
"SPIRITUAL HEALTH SERVICES CONSULT NOTE  [facility]; [facility]    Saw pt Amada Melo per Spiritual Care Consult    Patient/Family Understanding of Illness and Goals of Care - Rolanda is being induced this morning. She notes that she was induced with her 4 year old son and that it was a long process. She shares that this has been a difficult pregnancy.    Distress and Loss - Rolanda shared multiple difficult changes that she is/has been dealing with in the last year. Just under a year ago, she lost a daughter at 14 weeks. She states she was sent home over the weekend after learning that her daughter no longer had a heartbeat. This was quite painful. She notes that she was able to have a memorial service for her daughter (which was meaningful), but became pregnant shortly afterwards and doesn't feel she has been able to grieve since she has been pregnant. Additionally, she notes that her partner has not attended any prenatal appointments with her and is not answering her texts yet this morning. She says \"I'm not sure he fully understands that this is actually happening.\" On top of this, Rolanda shares that she had to move out of her mom's house when she was 7 months pregnant, which was not easy. She has two sons, whom she notes have special needs. She identifies her aunt as a source of support, but states that she would like her partner to be more available to support her at this time.     Strengths, Coping, and Resources - Rolanda says that being present for her sons (13 and 4) is her primary motivation in caring for herself. She also uses humor in coping. She also finds music soothing at times.     Meaning, Beliefs, and Spirituality - Rolanda does not identify any specific spiritual beliefs or practices. She says \"I believe that things happen for a reason, even if it doesn't directly related to my needs or circumstances.\"    I validated the significant losses Rolanda has experienced in the last year, affirmed her " strength in caring for her sons in the midst of his, and encouraged her to be gentle with herself as processes a lot of change. Rolanda may appreciate a staff member calling her partner to confirm that she is in labor.     Plan of Care: Available for further follow-up as requested by patient, family or staff.      Time Spent with Patient/Family: 15 minutes    Dodie Dominguez M.Div.  [facility]    Office: [phone] (for non-urgent requests)  Please Vocera or page through [facility] for time-sensitive requests

## 2023-11-17 NOTE — CONSULTS
"INITIAL SOCIAL WORK MATERNITY ASSESSMENT     DATA:      Reason for Social Work Consult: Complex Psychosocial Issues, Mental Health issues, community resources     Presenting Information: SW reviewed chart and discussed with pts RN. SW met with pt for initial assessment. No one else present at the hospital at this time.    Living Situation: Pt reports that she is living with her aunt and her two older children, ages 13 and 4. Pt confirms address in [place] is her current address. Pt reports that they just moved in with her aunt about a week ago. She reports that previous to that they had been living in a home that her mom owned for multiple years. She reports that her mom decided she wanted the house back and kicked them out. Pt reports that her partner, Matt, has his camper parked in the driveway at her aunt's home but is not really living with them.     Social Support/Professional Community Support:  Pt reports having very minimal social support indicating that her aunt is her only family support person. She reports that the relationship with her aunt is fairly new but that she has been amazing and supportive. She reports that her aunt is home and able to be with her kids while pt is hospitalized. Pt reports that she has known and been friends with Matt for over twenty years but that over the last year he has dealt with mental health issues that she has tried to help him with. She reports that he has \"memory lapses\" which he is not always aware of. Pt reports that her older son was recently diagnosed with ASD (autism spectrum disorder) and that this has been a stressor. She reports that his IEP is in place at school but that she has been unable to get him to go to school. She reports that he got care through [facility] in the past and his school has been very supportive.    Insurance: [employer] [place]     Source of Financial Support: Pt reports that she has not worked over the last year. She reports that she does " get food stamps. She denies being on WIC. She reports she was on WIC when pregnant with her daughter that she lost just under a year ago (per chart review pt experienced fetal demise at 14 weeks 4 days in 12/2022). She reports that she hasn't gotten on it during this pregnancy.     Baby Supplies: Pt reports having a bassinet and swing. She reports that she has a car seat from when her four year old was an infant, but it is in storage in [place]. She reports that Matt was planning to go get it but she is uncertain about his ability to do so. She denies having any clothes or diapers. She reports that they had started to get prepared for their daughter before they lost her last year but she has not been able to do so during this pregnancy. SW informed her that the hospital can provide some clothes and diapers. Pt thankful for this.     Mental Health History: Pt endorses a significant history of mental health concerns. She reports that she was seeing a psychiatrist at [facility] but they left the clinic. She reports that they attempted to connect her with a new provider, but it wasn't a good fit and she has not been able to connect with someone else to help with medications while she was pregnant. Pt endorses self-harm behaviors, as noted in nursing notes and visible on pt arms. She denies any suicidality and reports that she has to be here for her kids. She confirms this is a huge protective factor for her. She reports that she has a distant history of a suicide attempt in high school but not since that time.      Chemical Health History: Per chart review, no tox screen collected on the pt during this admission. Tox screen was collected during previous admission on 11/13/23 which was positive for amphetamines. Nursing reports that pt had a previous prescription for Adderall which she takes sporadically as needed though she does not have a current prescription or provider for this medication. During previous admission  a CPS report was sent to [facility] CPS per review. The confirmatory screen on this test is still pending at this time. SW will follow for those results and possible tox screen on pts baby.        INTERVENTION:      SW completed chart review and collaborated with the multidisciplinary team.   Psychosocial Assessment   Introduction to Maternal Child Health  role and scope of practice   Reviewed Hospital and Community Resources   Assessed Chemical Health History and Current Symptoms   Assessed Mental Health History and Current Symptoms   Identified stressors, barriers and family concerns   Provided support and active empathetic listening and validation.   Provided psychoeducation on  mood and anxiety disorders, assessed for any current symptoms or history    ASSESSMENT:    Pt was receptive to SW visit and agreeable to assessment. She appeared open to sharing information and receiving support. She presented with a flat and tearful affect over the course of the assessment and appeared to be dealing with both significant social challenges as well as significant grief related to the loss of her daughter just under a year ago. SW provided supportive listening and encouragement to the pt throughout the assessment.     Pt indicated some frustrations with Matt and his being absent from the hospital. SW asked if there were any issues or concerns related to abuse with him and pt denies this or any safety concerns.     Pt acknowledges that there have been limits to her ability to follow through on appointments and preparing for the baby during this pregnancy. She reports that her grief and anxiety related to her previous pregnancy loss impeded her ability to prepare in some ways including by purchasing items for the baby. Pt also reports that she has some executive functioning issues which make it challenging for her to keep track of and manage schedules and appointments. She reports that she did not go  to prenatal care after about 16 weeks related to both of these issues. She reports that she probably should have sought care sooner for her blood pressures but she had to make sure that her kids were cared for before she could get the care she needed. Pt reports that she did not have a therapist over the last year though acknowledges that this likely would be helpful for her in processing her grief and situation. She reports that she had to keep her focus on getting through the day to day needs of her house and kids and thinking about adding something else in was too stressful. SW validated the difficulty of the last year for the pt and provided support for her being at the hospital now and getting the care she needs at this time. Pt receptive to support.     PLAN:    SW provided parent resources to pt and will plan to follow during this hospitalization. SW informed pt that SW would follow up further to make sure pt gets connected to needed support when she is ready to discharge and eventually when her baby is ready to discharge as well. Discussed that SW would recommend pt get outpatient mental health support and noted that with good mental health support pt will likely be better able to manage some of the other issues she is dealing with. Pt receptive to continuing to discuss options for mental health support with SW. SW offered  visit related to pts experience of grief over the last year and pt receptive to this as well. ASHLEY will plan to offer community support:    Mental Health- SW placed sticky note for provider to request consideration of if psych consult may be appropriate as pt has not been seen by psychiatry for some time. SW will offer referral for outpatient MH support possibly at [facility] ([facility] [place]) or another  program depending on pt preference.    Parenting Support- Pt may benefit from referral for Parent Support Outreach Program in [facility] which can support her with  managing basic needs, parenting needs and her own mental health needs. Alternatively a public health nurse referral can be considered depending on pt preference    Discharge Planning- It appears pt would benefit from clear discharge plan and appointments scheduled prior to discharge.    Basic Needs- SW will continue to assess for basic needs including food and transportation pending plan for pt and baby. Will plan to provide clothes/diapers prior to discharge. Sw will offer to help pt connect with WIC prior to discharge if she is agreeable.    SW placed spiritual care consult. SW will plan to see pt again after delivery but can be contacted sooner if issues arise.    KJ Alvarenga on 11/17/2023 at 10:06 AM

## 2023-11-17 NOTE — H&P
Amada Dye MRN# [id number]   Age: 32 year old YOB: 1990         Primary care provider: Gretchen Orozco           Chief Complaint:   Amada Dye is a 32 year old female who is 35w2d pregnant who has had interrupted prenatal care.  She was in the hospital in the past week for elevated blood pressures was discharged home to check her pressures at home.  She states that her blood pressure was 160/100 in the room repeated was 170/140.  She has a mild headache no visual changes and no epigastric pain.  She denies any contractions or vaginal bleeding or leakage of fluid.  She endorses good fetal movement.  She is not taking any antihypertensives.  She did receive steroids when she was in the hospital recently.  She has a complicated social history with a special needs child at home and the difficult family situation.  She does not states she is in a danger at this time but is open to talking to social service in the morning to try and work on family situation.          Pregnancy history:     OBSTETRIC HISTORY:    OB History    Para Term  AB Living   5 2 2 0 2 2   SAB IAB Ectopic Multiple Live Births   0 2 0 0 2      # Outcome Date GA Lbr Nhan/2nd Weight Sex Delivery Anes PTL Lv   5 Current            4 IAB 22 14w4d  0.022 kg (0.8 oz) F Vag-Spont   FD      Birth Comments: head circumference n/a      Complications: Hemorrhage      Name: Ginette      Apgar1: 0  Apgar5: 0   3 Term 19 38w6d 29:25 / 00:13 3.46 kg (7 lb 10.1 oz) M  EPI N PHOEBE      Name: CATALINA DYE-AMADA      Apgar1: 8  Apgar5: 9   2 IAB 18    F IAB         Birth Comments: T21      Name: Milli   1 Term 08/02/10 40w0d  3.742 kg (8 lb 4 oz) M Vag-Spont   PHOEBE      Birth Comments: AP:n; IN:n; 1 degree perneal lac      Name: Gaurang      Apgar1: 8  Apgar5: 9       EDC: Estimated Date of Delivery: 23    Prenatal Labs:   Lab Results   Component Value Date    AS Negative 2023    HEPBANG  "Nonreactive 11/13/2023    CHPCRT Negative 05/31/2023    GCPCRT Negative 05/31/2023    HGB 11.0 (L) 11/16/2023       GBS Status:   No results found for: \"GBS\"    Active Problem List  Patient Active Problem List   Diagnosis    Low back pain with right-sided sciatica    PTSD (post-traumatic stress disorder)    Attention deficit hyperactivity disorder (ADHD)    Scoliosis    Anxiety    Genital herpes    Panic attacks    Papanicolaou smear of cervix with atypical squamous cells of undetermined significance (ASC-US)    Supervision of high-risk pregnancy    MIGUEL (generalized anxiety disorder)    Encounter for triage in pregnant patient       Medication Prior to Admission  Medications Prior to Admission   Medication Sig Dispense Refill Last Dose    ADDERALL XR 25 MG 24 hr capsule Take 25 mg by mouth daily   Past Week    albuterol (PROAIR HFA, PROVENTIL HFA, VENTOLIN HFA) 108 (90 BASE) MCG/ACT inhaler Inhale 2 puffs into the lungs every 4 hours as needed   Past Month    gabapentin (NEURONTIN) 300 MG capsule Take 300 mg by mouth at bedtime   Past Week   .        Maternal Past Medical History:     Past Medical History:   Diagnosis Date    ADD (attention deficit disorder)     Genital herpes     Incomplete miscarriage 12/19/2022    Miscarriage 12/19/2022    PTSD (post-traumatic stress disorder)     Superficial thrombophlebitis during pregnancy in first trimester 11/24/2018    Formatting of this note might be different from the original. Consult Dr. Manuel; patient completed 45 days Lovenox.    Uncomplicated asthma                        Family History:   This patient has no significant family history            Social History:   This patient has no significant social history         Review of Systems:   CONSTITUTIONAL: NEGATIVE for fever, chills, change in weight  ENT/MOUTH: NEGATIVE for ear, mouth and throat problems  RESP: NEGATIVE for significant cough or SOB  CV: NEGATIVE for chest pain, palpitations or peripheral edema        "   Physical Exam:   Vitals were reviewed  Temp: 98.1  F (36.7  C)   BP: 135/80     Resp: 22 SpO2: 98 % O2 Device: None (Room air)    Lungs:   No increased work of breathing, good air exchange, clear to auscultation bilaterally, no crackles or wheezing     Cardiovascular:   regular rate and rhythm     Abdomen:   No scars, normal bowel sounds, soft, non-distended, non-tender, no masses palpated, no hepatosplenomegally     Neurologic:   Deep Tendon Reflexes:  Right Knee:  1+  Left Knee:  1+      Cervix:   Membranes: intact   Deferred is not fer    Fetal Heart Rate Tracins with a category 1 tracing  Tocometer: No contractions                 Creatinine 0.46  AST 16  AST 10  Platelet 206  Protein creatinine ratio 0.24      Assessment:   Amada Melo is a 35w2d pregnant female with elevated blood pressures at home and a complex social situation and interrupted prenatal care          Plan:   I discussed that she does not have severe hypertensive disease at this point but we should observe her to make sure that her pressures do not become elevated over the next 24 hours.  If she was severe then I would recommend induction of labor.  If she stays in the mild range and then I would agree with waiting until  for her scheduled induction at 37 weeks.  We discussed the risks of premature delivery versus the risk of hypertensive sequela.  All questions were answered.  She verbalized understanding the above.  She is in agreement with the plan.    Pipe Dennis MD

## 2023-11-17 NOTE — PROGRESS NOTES
Per Dr. Dennis recommendations will do 24 hours observation for blood pressure. Pt agreeable with the plan. Will do Q-NST and Q4 hours blood pressures.  Pt moved to room 24 from room 31 triage.   Pt has multiple cuts both arms, old and recent cuts. Pt reports she self mutilates due to stresses. Pt denies any suicidal ideations or his history of suicide attempts. Social work consult in place.

## 2023-11-18 LAB — BACTERIA SPEC CULT: ABNORMAL

## 2023-11-18 PROCEDURE — 250N000013 HC RX MED GY IP 250 OP 250 PS 637: Performed by: OBSTETRICS & GYNECOLOGY

## 2023-11-18 PROCEDURE — 120N000001 HC R&B MED SURG/OB

## 2023-11-18 PROCEDURE — 250N000011 HC RX IP 250 OP 636: Mod: JZ | Performed by: OBSTETRICS & GYNECOLOGY

## 2023-11-18 PROCEDURE — 258N000003 HC RX IP 258 OP 636: Performed by: OBSTETRICS & GYNECOLOGY

## 2023-11-18 RX ORDER — HYDROXYZINE HYDROCHLORIDE 25 MG/1
25 TABLET, FILM COATED ORAL EVERY 6 HOURS PRN
Status: DISCONTINUED | OUTPATIENT
Start: 2023-11-18 | End: 2023-11-23 | Stop reason: HOSPADM

## 2023-11-18 RX ORDER — HYDROXYZINE HYDROCHLORIDE 50 MG/1
50 TABLET, FILM COATED ORAL EVERY 6 HOURS PRN
Status: DISCONTINUED | OUTPATIENT
Start: 2023-11-18 | End: 2023-11-23 | Stop reason: HOSPADM

## 2023-11-18 RX ADMIN — Medication 25 MCG: at 20:45

## 2023-11-18 RX ADMIN — Medication 25 MCG: at 19:02

## 2023-11-18 RX ADMIN — Medication 25 MCG: at 22:50

## 2023-11-18 RX ADMIN — Medication 25 MCG: at 13:26

## 2023-11-18 RX ADMIN — Medication 25 MCG: at 08:44

## 2023-11-18 RX ADMIN — SODIUM CHLORIDE, POTASSIUM CHLORIDE, SODIUM LACTATE AND CALCIUM CHLORIDE 75 ML/HR: 600; 310; 30; 20 INJECTION, SOLUTION INTRAVENOUS at 22:06

## 2023-11-18 RX ADMIN — Medication 25 MCG: at 17:06

## 2023-11-18 RX ADMIN — MAGNESIUM SULFATE HEPTAHYDRATE 2 G/HR: 40 INJECTION, SOLUTION INTRAVENOUS at 06:09

## 2023-11-18 RX ADMIN — HYDROXYZINE HYDROCHLORIDE 100 MG: 50 TABLET, FILM COATED ORAL at 22:50

## 2023-11-18 RX ADMIN — HYDROXYZINE HYDROCHLORIDE 100 MG: 50 TABLET, FILM COATED ORAL at 00:10

## 2023-11-18 RX ADMIN — Medication 25 MCG: at 15:10

## 2023-11-18 RX ADMIN — MAGNESIUM SULFATE HEPTAHYDRATE 2 G/HR: 40 INJECTION, SOLUTION INTRAVENOUS at 22:03

## 2023-11-18 RX ADMIN — Medication 25 MCG: at 11:03

## 2023-11-18 RX ADMIN — HYDROXYZINE HYDROCHLORIDE 25 MG: 25 TABLET, FILM COATED ORAL at 16:30

## 2023-11-18 ASSESSMENT — ACTIVITIES OF DAILY LIVING (ADL)
ADLS_ACUITY_SCORE: 20

## 2023-11-18 NOTE — PROGRESS NOTES
"Patient complains of \"heart fluttering\" and restlessness. She was previously sleeping and awoke with these symptoms. VSS with HR 93 and /72.   Patient recently had a conversation with social work and reported feeling overwhelmed.     Dr. Hairston notified of patient symptoms. Orders for vistaril. Will continue to monitor.   "

## 2023-11-18 NOTE — PROGRESS NOTES
/ at 2300. Repeat bp was 143/89.  Report to SIVAN Davis and SIVAN Motley. Care relinquished.Cecilia Stewart RN

## 2023-11-18 NOTE — PLAN OF CARE
"Writer assumed patient care at 1532.    Patient reports a fluctuating frontal headache, rating 2-4/10. Tylenol given x1 and lights dimmed. Patient has been sleeping between cares. Eating and drinking well. Has good urine output. Swelling is unchanged. Reflexes WDL. Denies nausea, RUQ pain, and vision changes. Patient reports flushing and tiredness related to the mag. BP's have been in 130s-140s/70s-90s. Category 1 tracing. Difficulty tracing contractions despite adjustments and position changes. They palpate mild and are irregular. Patient was unable to give a time frame for frequency of contractions, but states they are \"spacing out\".    Update given to Dr. Blanco. Provider met with patient and writer at patients bedside to discuss plan of care. Provider discussed the cook catheter as a ripening option with the patient, and patient is agreeable to having it placed. Will also stop cytotec at this time. Cook catheter placed at 1755 by Dr. Blanco, uterine balloon inflated with 60cc NS.    Problem: Labor  Goal: Stable Fetal Wellbeing  Outcome: Progressing     Problem: Labor  Goal: Absence of Infection Signs and Symptoms  Outcome: Progressing    Problem: Labor  Goal: Acceptable Pain Control  Outcome: Progressing     "

## 2023-11-18 NOTE — PROGRESS NOTES
Pt ready to switch out beds. Up to bathroom, voided 500cc and did tommy care and bedtime cares independently. Pt changed into hospital gown. RN assisted with removal of nightgown, shirt and bra around IV lines.   Back to bed in semi fowlers/right tilt to see if that helps monitor cxns to get a picture of what her uterus is doing. Pt in agreement. Moderate cxn palpated while placing belts. Also using washcloths and belly band to trace cxns and fhts.   Reviewed mechanical ripening and feeling the bulb, reassurance and validation given that, even if cxn not present, the bulb is still putting pressure on cervix to assist in dilation. Pt accepted explanation.   Readdresses sleep meds available and pt stated she only wanted the vistaril, because before, after she was given fentanyl, it took away her options for pain management other than the epidural. Discussed the meds used in labor (Nitrous, IV Fentanyl and the epidural...the expectations of each and time sensitivities). Informed pt that using Morphine at this time, will not prevent her from other options in labor. Pt accepted the morphine with the vistaril. Meds given per order. Pt settled in for sleep.Cecilia Stewart RN

## 2023-11-18 NOTE — PROVIDER NOTIFICATION
11/18/23 0000   Comments   Comments unable to assess contraction pattern due to pt position, pt asleep and getting rest after sleep meds given;toco adjusted     Pt asleep positioned on right side; rest encouraged after Vistaril given PRN. Nicut not picking up contractions in pt position. Nicut readjusted; pt restful.

## 2023-11-18 NOTE — PLAN OF CARE
Problem: Adult Inpatient Plan of Care  Goal: Optimal Comfort and Wellbeing  Outcome: Progressing  Intervention: Provide Person-Centered Care  Recent Flowsheet Documentation  Taken 11/17/2023 1957 by Cecilia Stewart RN  Trust Relationship/Rapport:   care explained   choices provided   emotional support provided   empathic listening provided   questions answered   questions encouraged   reassurance provided   thoughts/feelings acknowledged     Problem: Labor  Goal: Stable Fetal Wellbeing  Outcome: Progressing     Problem: Labor  Goal: Effective Progression to Delivery  Outcome: Progressing     Problem: Labor  Goal: Acceptable Pain Control  Outcome: Progressing     Problem: Labor  Goal: Normal Uterine Contraction Pattern  Outcome: Progressing   Goal Outcome Evaluation:    VSS. O2 sats 94-95% on RA. Enc deep breaths and to try to avoid shallow breathing. Pt tolerating magnesium. Feeling cxns, rating pain as 6/10. Mild cxns palpated several times, as well as no cxn present when pt feeling pain. Pt feeling pain low. Discussed Cook catheter balloon and giving pressure. Offered warm pack to lower abdomen and pt accepted. Discussed morphine and vistaril available for comfort/sleep.  Difficulty tracing cxns with pt in lateral position. Adjusting toco frequently.  FHTs CAT I  Pt asking if writer can call FOB. Affirmed that I would, but inquired how he will be towards her, when notified that she is not in active labor, but would like him her with her. She said she didn't know and he probably wouldn't come. Inquired if she had any family members(s) or friends that could come be with her and support her. She said no. Discussed the process of cervical ripening and it is at a slower pace than labor. Reviewed difficulty in tracing cxns, and by palpation are still mild.   Discussed plan for when pt is up to bathroom next, to switch out med surge bed for labor bed. Requested pt let RN know if/when she wants FOB contacted and  if/when she wants sleep meds.   LSCTA. Diminished reflexes to bilateral patellar reflexes. No clonus. No order for SCDs at this time. .Cecilia Stewart RN

## 2023-11-18 NOTE — PROGRESS NOTES
"SW received a call from RN stating Pt requests to speak with SW.    SW briefly reviewed chart and met with Pt.  Pt tearful and reports, \"I know more tests are going to come back and I am wondering what that means\".  SW clarified with Pt, she is referring to drug screening.  Pt reports she is prescribed adderal through provider at [facility] and was unable to get prescription during pregnancy and used amphetamines.  Pt expressed, \"I have just been trying to keep up with my kids and I can't do it all unmediated\".  Pt shares last use was about a week ago. SW provided supportive listening and acknowledged the many stressors pt is experiencing.  SW expressed appreciation for Pt's honesty with this difficult conversation.  SW discussed that SW will follow for Pt's drug screen (last collected 11/13, positive for amphetamines) and baby's tox screen after delivery.  Discussed that SW will make a report to the [facility] for further direction and CPS worker will likely meet with Pt for assessment.  Pt states, \"I don't need treatment, I just need to get back on my medication\".  Pt states, \"I've never been in this situation before\".  SW validated the unknowns of this situation and discussed plan to be present to support pt throughout this process.  Pt will need assistance with finding a provider to prescribe necessary medications moving forward.       ERIC Tierney at 1:35 PM on 11/18/23   "

## 2023-11-18 NOTE — PLAN OF CARE
Goal Outcome Evaluation:    Problem: Labor  Goal: Stable Fetal Wellbeing  Outcome: Progressing     Problem: Labor  Goal: Effective Progression to Delivery  Outcome: Progressing        Pt has been resting/asleep most of the night after 2nd dose of Vistaril. Pt denies pre-e symptoms during this shift. No severe range b/ps. Cook cath removed 0600; SVE remains relatively unchanged. Will notify MD of status update during morning rounds. FHR tracing cat I.

## 2023-11-18 NOTE — PROGRESS NOTES
"Labor progress note    S: Patient intermittently tearful. Significant other had gone home to get his children. He has been calling the patient by his ex-wife's name who in the past reportedly had exaggerated her health status so if the patient starts laboring, she asked if one of us (Rns, docs) could call him to let him know she is laboring and to come in so he believes her.    O  Vitals: /86   Pulse 99   Temp 98.1  F (36.7  C) (Oral)   Resp 24   Ht 1.803 m (5' 11\")   Wt 113.7 kg (250 lb 10.6 oz)   SpO2 96%   BMI 34.96 kg/m      SVE: 2/40/-3/soft/posterior    Cook with 60 mL in intrauterine balloon placed at 1755.     FHT: Category 1, reactive  [place]: Difficult to trace, appears like no contractions, but switched to Cook catheter for this reason.     A/P:  Amada Melo is a 32 year old  at 35w3d here for IOL for pre-eclampsia with severe features  -S/p 3 doses of misoprostol  -Cook with 60 mL in the intrauterine balloon placed.  -Offered sleep medications  -Patient will see how labor progresses when it comes to pain medication - her goal is to be able to feel pressure and move her legs if she gets an epidural.    Lola Blanco MD   "

## 2023-11-18 NOTE — PROVIDER NOTIFICATION
11/18/23 0653   Provider Notification   Provider Name/Title Bella ROLLINS   Method of Notification Phone   Request Evaluate - Remote   Notification Reason Status Update;SVE;Other (Comment)  (notified MD of SVE remaining relatively unchanged; orders to continue cytotec.)     Per MD ok to resume Cytotec for cervical ripening, up to 9 more doses. Patient care order placed.

## 2023-11-18 NOTE — PROGRESS NOTES
OB NOTE    Receiving Vistaril for anxiety and feeling of anxiety  BP stable  FHT:CAT 1  Still in latent phase induction  Continue Cytotec  Seen with RN  YUE Hairston MD

## 2023-11-19 ENCOUNTER — ANESTHESIA EVENT (OUTPATIENT)
Dept: OBGYN | Facility: HOSPITAL | Age: 33
End: 2023-11-19
Payer: COMMERCIAL

## 2023-11-19 ENCOUNTER — ANESTHESIA (OUTPATIENT)
Dept: OBGYN | Facility: HOSPITAL | Age: 33
End: 2023-11-19
Payer: COMMERCIAL

## 2023-11-19 LAB
ERYTHROCYTE [DISTWIDTH] IN BLOOD BY AUTOMATED COUNT: 12.2 % (ref 10–15)
HCT VFR BLD AUTO: 35.4 % (ref 35–47)
HGB BLD-MCNC: 11.7 G/DL (ref 11.7–15.7)
MCH RBC QN AUTO: 30.4 PG (ref 26.5–33)
MCHC RBC AUTO-ENTMCNC: 33.1 G/DL (ref 31.5–36.5)
MCV RBC AUTO: 92 FL (ref 78–100)
PLATELET # BLD AUTO: 225 10E3/UL (ref 150–450)
RBC # BLD AUTO: 3.85 10E6/UL (ref 3.8–5.2)
WBC # BLD AUTO: 11.8 10E3/UL (ref 4–11)

## 2023-11-19 PROCEDURE — 370N000003 HC ANESTHESIA WARD SERVICE: Performed by: ANESTHESIOLOGY

## 2023-11-19 PROCEDURE — 250N000013 HC RX MED GY IP 250 OP 250 PS 637: Performed by: OBSTETRICS & GYNECOLOGY

## 2023-11-19 PROCEDURE — 120N000001 HC R&B MED SURG/OB

## 2023-11-19 PROCEDURE — 250N000011 HC RX IP 250 OP 636: Mod: JZ | Performed by: OBSTETRICS & GYNECOLOGY

## 2023-11-19 PROCEDURE — 36415 COLL VENOUS BLD VENIPUNCTURE: CPT | Performed by: OBSTETRICS & GYNECOLOGY

## 2023-11-19 PROCEDURE — 258N000003 HC RX IP 258 OP 636: Performed by: ANESTHESIOLOGY

## 2023-11-19 PROCEDURE — 10907ZC DRAINAGE OF AMNIOTIC FLUID, THERAPEUTIC FROM PRODUCTS OF CONCEPTION, VIA NATURAL OR ARTIFICIAL OPENING: ICD-10-PCS | Performed by: OBSTETRICS & GYNECOLOGY

## 2023-11-19 PROCEDURE — 250N000009 HC RX 250: Performed by: OBSTETRICS & GYNECOLOGY

## 2023-11-19 PROCEDURE — 250N000011 HC RX IP 250 OP 636: Mod: JZ | Performed by: ANESTHESIOLOGY

## 2023-11-19 PROCEDURE — 88307 TISSUE EXAM BY PATHOLOGIST: CPT | Mod: 26 | Performed by: PATHOLOGY

## 2023-11-19 PROCEDURE — 250N000011 HC RX IP 250 OP 636: Performed by: OBSTETRICS & GYNECOLOGY

## 2023-11-19 PROCEDURE — 250N000009 HC RX 250: Performed by: ANESTHESIOLOGY

## 2023-11-19 PROCEDURE — 250N000011 HC RX IP 250 OP 636: Performed by: ANESTHESIOLOGY

## 2023-11-19 PROCEDURE — 88307 TISSUE EXAM BY PATHOLOGIST: CPT | Mod: TC | Performed by: OBSTETRICS & GYNECOLOGY

## 2023-11-19 PROCEDURE — 85027 COMPLETE CBC AUTOMATED: CPT | Performed by: OBSTETRICS & GYNECOLOGY

## 2023-11-19 RX ORDER — IBUPROFEN 800 MG/1
800 TABLET, FILM COATED ORAL EVERY 6 HOURS PRN
Status: DISCONTINUED | OUTPATIENT
Start: 2023-11-19 | End: 2023-11-23 | Stop reason: HOSPADM

## 2023-11-19 RX ORDER — SODIUM CHLORIDE, SODIUM LACTATE, POTASSIUM CHLORIDE, CALCIUM CHLORIDE 600; 310; 30; 20 MG/100ML; MG/100ML; MG/100ML; MG/100ML
INJECTION, SOLUTION INTRAVENOUS CONTINUOUS PRN
Status: DISCONTINUED | OUTPATIENT
Start: 2023-11-19 | End: 2023-11-19 | Stop reason: HOSPADM

## 2023-11-19 RX ORDER — BUPIVACAINE HYDROCHLORIDE 2.5 MG/ML
INJECTION, SOLUTION EPIDURAL; INFILTRATION; INTRACAUDAL
Status: COMPLETED | OUTPATIENT
Start: 2023-11-19 | End: 2023-11-19

## 2023-11-19 RX ORDER — ACETAMINOPHEN 325 MG/1
650 TABLET ORAL EVERY 4 HOURS PRN
Status: DISCONTINUED | OUTPATIENT
Start: 2023-11-19 | End: 2023-11-23 | Stop reason: HOSPADM

## 2023-11-19 RX ORDER — FENTANYL CITRATE-0.9 % NACL/PF 10 MCG/ML
100 PLASTIC BAG, INJECTION (ML) INTRAVENOUS EVERY 5 MIN PRN
Status: DISCONTINUED | OUTPATIENT
Start: 2023-11-19 | End: 2023-11-19 | Stop reason: HOSPADM

## 2023-11-19 RX ORDER — FENTANYL/ROPIVACAINE/NS/PF 2MCG/ML-.1
PLASTIC BAG, INJECTION (ML) EPIDURAL
Status: DISCONTINUED | OUTPATIENT
Start: 2023-11-19 | End: 2023-11-19 | Stop reason: HOSPADM

## 2023-11-19 RX ORDER — DOCUSATE SODIUM 100 MG/1
100 CAPSULE, LIQUID FILLED ORAL DAILY
Status: DISCONTINUED | OUTPATIENT
Start: 2023-11-19 | End: 2023-11-23 | Stop reason: HOSPADM

## 2023-11-19 RX ORDER — OXYTOCIN/0.9 % SODIUM CHLORIDE 30/500 ML
340 PLASTIC BAG, INJECTION (ML) INTRAVENOUS CONTINUOUS PRN
Status: DISCONTINUED | OUTPATIENT
Start: 2023-11-19 | End: 2023-11-23 | Stop reason: HOSPADM

## 2023-11-19 RX ORDER — BISACODYL 10 MG
10 SUPPOSITORY, RECTAL RECTAL DAILY PRN
Status: DISCONTINUED | OUTPATIENT
Start: 2023-11-19 | End: 2023-11-23 | Stop reason: HOSPADM

## 2023-11-19 RX ORDER — TRANEXAMIC ACID 10 MG/ML
1 INJECTION, SOLUTION INTRAVENOUS EVERY 30 MIN PRN
Status: DISCONTINUED | OUTPATIENT
Start: 2023-11-19 | End: 2023-11-23 | Stop reason: HOSPADM

## 2023-11-19 RX ORDER — MISOPROSTOL 200 UG/1
400 TABLET ORAL
Status: DISCONTINUED | OUTPATIENT
Start: 2023-11-19 | End: 2023-11-23 | Stop reason: HOSPADM

## 2023-11-19 RX ORDER — MODIFIED LANOLIN
OINTMENT (GRAM) TOPICAL
Status: DISCONTINUED | OUTPATIENT
Start: 2023-11-19 | End: 2023-11-23 | Stop reason: HOSPADM

## 2023-11-19 RX ORDER — ONDANSETRON 4 MG/1
4 TABLET, ORALLY DISINTEGRATING ORAL EVERY 6 HOURS PRN
Status: DISCONTINUED | OUTPATIENT
Start: 2023-11-19 | End: 2023-11-19 | Stop reason: HOSPADM

## 2023-11-19 RX ORDER — CLINDAMYCIN PHOSPHATE 900 MG/50ML
900 INJECTION, SOLUTION INTRAVENOUS EVERY 8 HOURS
Status: DISCONTINUED | OUTPATIENT
Start: 2023-11-19 | End: 2023-11-19

## 2023-11-19 RX ORDER — OXYTOCIN/0.9 % SODIUM CHLORIDE 30/500 ML
1-24 PLASTIC BAG, INJECTION (ML) INTRAVENOUS CONTINUOUS
Status: DISCONTINUED | OUTPATIENT
Start: 2023-11-19 | End: 2023-11-19 | Stop reason: HOSPADM

## 2023-11-19 RX ORDER — NALBUPHINE HYDROCHLORIDE 20 MG/ML
2.5-5 INJECTION, SOLUTION INTRAMUSCULAR; INTRAVENOUS; SUBCUTANEOUS EVERY 6 HOURS PRN
Status: DISCONTINUED | OUTPATIENT
Start: 2023-11-19 | End: 2023-11-23 | Stop reason: HOSPADM

## 2023-11-19 RX ORDER — LIDOCAINE HYDROCHLORIDE AND EPINEPHRINE 15; 5 MG/ML; UG/ML
INJECTION, SOLUTION EPIDURAL PRN
Status: DISCONTINUED | OUTPATIENT
Start: 2023-11-19 | End: 2023-11-19

## 2023-11-19 RX ORDER — LIDOCAINE HYDROCHLORIDE 10 MG/ML
INJECTION, SOLUTION EPIDURAL; INFILTRATION; INTRACAUDAL; PERINEURAL PRN
Status: DISCONTINUED | OUTPATIENT
Start: 2023-11-19 | End: 2023-11-19

## 2023-11-19 RX ORDER — ONDANSETRON 2 MG/ML
4 INJECTION INTRAMUSCULAR; INTRAVENOUS EVERY 6 HOURS PRN
Status: DISCONTINUED | OUTPATIENT
Start: 2023-11-19 | End: 2023-11-19 | Stop reason: HOSPADM

## 2023-11-19 RX ORDER — HYDROCORTISONE 25 MG/G
CREAM TOPICAL 3 TIMES DAILY PRN
Status: DISCONTINUED | OUTPATIENT
Start: 2023-11-19 | End: 2023-11-23 | Stop reason: HOSPADM

## 2023-11-19 RX ORDER — MISOPROSTOL 200 UG/1
800 TABLET ORAL
Status: DISCONTINUED | OUTPATIENT
Start: 2023-11-19 | End: 2023-11-23 | Stop reason: HOSPADM

## 2023-11-19 RX ORDER — METHYLERGONOVINE MALEATE 0.2 MG/ML
200 INJECTION INTRAVENOUS
Status: DISCONTINUED | OUTPATIENT
Start: 2023-11-19 | End: 2023-11-23 | Stop reason: HOSPADM

## 2023-11-19 RX ORDER — CARBOPROST TROMETHAMINE 250 UG/ML
250 INJECTION, SOLUTION INTRAMUSCULAR
Status: DISCONTINUED | OUTPATIENT
Start: 2023-11-19 | End: 2023-11-23 | Stop reason: HOSPADM

## 2023-11-19 RX ORDER — LIDOCAINE 40 MG/G
CREAM TOPICAL
Status: DISCONTINUED | OUTPATIENT
Start: 2023-11-19 | End: 2023-11-19 | Stop reason: HOSPADM

## 2023-11-19 RX ORDER — OXYTOCIN 10 [USP'U]/ML
10 INJECTION, SOLUTION INTRAMUSCULAR; INTRAVENOUS
Status: DISCONTINUED | OUTPATIENT
Start: 2023-11-19 | End: 2023-11-23 | Stop reason: HOSPADM

## 2023-11-19 RX ADMIN — LIDOCAINE HYDROCHLORIDE 3 ML: 10 INJECTION, SOLUTION EPIDURAL; INFILTRATION; INTRACAUDAL; PERINEURAL at 14:17

## 2023-11-19 RX ADMIN — Medication: at 14:26

## 2023-11-19 RX ADMIN — Medication 25 MCG: at 00:58

## 2023-11-19 RX ADMIN — CLINDAMYCIN PHOSPHATE 900 MG: 900 INJECTION, SOLUTION INTRAVENOUS at 11:20

## 2023-11-19 RX ADMIN — Medication 2 MILLI-UNITS/MIN: at 10:55

## 2023-11-19 RX ADMIN — LIDOCAINE HYDROCHLORIDE,EPINEPHRINE BITARTRATE 3 ML: 15; .005 INJECTION, SOLUTION EPIDURAL; INFILTRATION; INTRACAUDAL; PERINEURAL at 14:21

## 2023-11-19 RX ADMIN — KETOROLAC TROMETHAMINE 30 MG: 30 INJECTION, SOLUTION INTRAMUSCULAR; INTRAVENOUS at 18:39

## 2023-11-19 RX ADMIN — SODIUM CHLORIDE, POTASSIUM CHLORIDE, SODIUM LACTATE AND CALCIUM CHLORIDE 500 ML: 600; 310; 30; 20 INJECTION, SOLUTION INTRAVENOUS at 14:00

## 2023-11-19 RX ADMIN — ACETAMINOPHEN 650 MG: 325 TABLET ORAL at 21:42

## 2023-11-19 RX ADMIN — DOCUSATE SODIUM 100 MG: 100 CAPSULE, LIQUID FILLED ORAL at 21:42

## 2023-11-19 RX ADMIN — BUPIVACAINE HYDROCHLORIDE 5 ML: 2.5 INJECTION, SOLUTION EPIDURAL; INFILTRATION; INTRACAUDAL at 14:25

## 2023-11-19 ASSESSMENT — ACTIVITIES OF DAILY LIVING (ADL)
ADLS_ACUITY_SCORE: 20

## 2023-11-19 NOTE — PROGRESS NOTES
Cervix 7-8/100/head posterior and a bit ballotable  Cat 1 tracing in 150s  /67  Epidural working well  Will continue with laboring and attempt AROM when head descends.

## 2023-11-19 NOTE — PROGRESS NOTES
Patient requesting epidural at 1405. Dr. Dallas in room at 1415. Patient sitting on edge of bed for procedure. IV fluid bolus running.   Consent signed, timeout performed.   Patient tolerated procedure well and was placed in left tilt position at 1428. Serial BPs and continuous pulse ox in place.     Kiana Bangura RN

## 2023-11-19 NOTE — PLAN OF CARE
Problem: Labor  Goal: Stable Fetal Wellbeing  Outcome: Progressing  Goal: Effective Progression to Delivery  Outcome: Progressing  Goal: Normal Uterine Contraction Pattern  Outcome: Progressing   Goal Outcome Evaluation:        Patient on IV pitocin with regular contractions every 2-4min, increasing in intensity.   FHTs moderate variability with accelerations, no decels.   States is coping.     Reported /87 and 152/88 to Dr. Dennis. Patient denies preeclampsia symptoms Orders to repeat BP in one hour.

## 2023-11-19 NOTE — PROGRESS NOTES
Report received and pt cares assumed. Pt sleeping, will go in and do mag assessment and vital signs at 0000

## 2023-11-19 NOTE — PLAN OF CARE
Goal Outcome Evaluation:      Plan of Care Reviewed With: patient  Patient verbalized understanding. Patient has been voiding large amount of urine without problems. Magnesium infusing at 2g/hr and LR infusing at 75cc/hr. Pt completed the last dose of cytotec at 1am. Dr Hairston updated but, new plan will be made this morning. Ctxs were showing occasionally when she was on cytotec but for few hours now, I have not been able to trace any ctxs.

## 2023-11-19 NOTE — PROVIDER NOTIFICATION
11/19/23 0609   Vital Signs   Resp 16   Pulse Rate Source Monitor   Oximeter Heart Rate 81 bpm   /85   BP Location Right arm   Patient Position Left side   Cuff Size Adult Regular     Pt blood pressures has been between 130s to 140s /70s-80s requiring no treatment  Reflexes normal, no clonus. Pt denies  s/s of preeclampsia or mag toxicity, and  she remains afebrile. Pt slept most of the shift, getting up occasionally to  void.

## 2023-11-19 NOTE — ANESTHESIA PREPROCEDURE EVALUATION
"Anesthesia Pre-Procedure Evaluation    Patient: Amada Melo   MRN: [id number] : 1990        Procedure :           Past Medical History:   Diagnosis Date    ADD (attention deficit disorder)     Genital herpes     Incomplete miscarriage 2022    Miscarriage 2022    PTSD (post-traumatic stress disorder)     Superficial thrombophlebitis during pregnancy in first trimester 2018    Formatting of this note might be different from the original. Consult Dr. Manuel; patient completed 45 days Lovenox.    Uncomplicated asthma       Past Surgical History:   Procedure Laterality Date    DILATION AND CURETTAGE      DILATION AND CURETTAGE SUCTION N/A 2022    Procedure: DILATION AND CURETTAGE, UTERUS, USING SUCTION;  Surgeon: Alicia Mota MD;  Location: [facility] OR    ENT SURGERY      PELVIC EXAMINATION UNDER ANESTHESIA N/A 2022    Procedure: EXAM UNDER ANESTHESIA, GYNECOLOGIC;  Surgeon: Alicia Mota MD;  Location: [facility] OR    TONSILLECTOMY      WISDOM TOOTH EXTRACTION        Allergies   Allergen Reactions    Amoxicillin-Pot Clavulanate Hives    Adhesive Tape      Other reaction(s): Contact Dermatitis  blisters  Chemical burn like reaction to steri strips      Nickel      Skin irritation     Penicillins Hives    Steri Strips      blister      Social History     Tobacco Use    Smoking status: Every Day     Packs/day: .5     Types: Cigarettes    Smokeless tobacco: Never    Tobacco comments:     5 cigs per day   Substance Use Topics    Alcohol use: Not Currently     Comment: Alcoholic Drinks/day: \"sometimes\" - not in pregnancy      Wt Readings from Last 1 Encounters:   23 112.6 kg (248 lb 3.2 oz)        Anesthesia Evaluation            ROS/MED HX  ENT/Pulmonary:  - neg pulmonary ROS   (+)                    Intermittent, asthma                  Neurologic:  - neg neurologic ROS     Cardiovascular:  - neg cardiovascular ROS   (+)  - - PIH " (elevated BP, has been on mag, off currently)  -  - -                                      METS/Exercise Tolerance:     Hematologic:  - neg hematologic  ROS     Musculoskeletal:   (+)       scoliosis, low back pain,       GI/Hepatic:  - neg GI/hepatic ROS     Renal/Genitourinary:       Endo:  - neg endo ROS   (+)               Obesity (bmi 37),       Psychiatric/Substance Use: Comment: ADHD - on meds - neg psychiatric ROS   (+) psychiatric history (PTSD) anxiety and other (comment)       Infectious Disease:       Malignancy:       Other:            Physical Exam    Airway        Mallampati: II   TM distance: > 3 FB   Neck ROM: full   Mouth opening: > 3 cm    Respiratory Devices and Support         Dental  no notable dental history         Cardiovascular   cardiovascular exam normal          Pulmonary   pulmonary exam normal            Other findings: Physiologic changes of pregnancy    OUTSIDE LABS:  CBC:   Lab Results   Component Value Date    WBC 11.8 (H) 11/19/2023    WBC 8.4 11/17/2023    HGB 11.7 11/19/2023    HGB 11.6 (L) 11/17/2023    HCT 35.4 11/19/2023    HCT 35.0 11/17/2023     11/19/2023     11/17/2023     BMP:   Lab Results   Component Value Date     (L) 11/17/2023     (L) 11/16/2023    POTASSIUM 4.2 11/17/2023    POTASSIUM 4.0 11/16/2023    CHLORIDE 102 11/17/2023    CHLORIDE 104 11/16/2023    CO2 21 (L) 11/17/2023    CO2 21 (L) 11/16/2023    BUN 7.9 11/17/2023    BUN 8.5 11/16/2023    CR 0.48 (L) 11/17/2023    CR 0.46 (L) 11/16/2023    GLC 84 11/17/2023     (H) 11/16/2023     COAGS:   Lab Results   Component Value Date    PTT 29 05/30/2023    INR 1.08 05/30/2023     POC:   Lab Results   Component Value Date    HCGS Positive (A) 12/18/2022     HEPATIC:   Lab Results   Component Value Date    ALBUMIN 3.2 (L) 11/17/2023    PROTTOTAL 6.0 (L) 11/17/2023    ALT 15 11/17/2023    AST 12 11/17/2023    ALKPHOS 118 11/17/2023    BILITOTAL <0.2 11/17/2023     OTHER:   Lab Results    Component Value Date    A1C 5.1 11/13/2023    CHEKO 8.9 11/17/2023    MAG 2.0 05/31/2023    LIPASE 16 06/19/2023    TSH 1.46 12/19/2022       Anesthesia Plan    ASA Status:  3       Anesthesia Type: Epidural.              Consents    Anesthesia Plan(s) and associated risks, benefits, and realistic alternatives discussed. Questions answered and patient/representative(s) expressed understanding.     - Discussed:     - Discussed with:  Patient            Postoperative Care            Comments:    Other Comments: Patient requests labor epidural. Chart reviewed, including labs. Reviewed options and risks with the patient, including but not limited to: bleeding, infection, damage to tissues under the skin (nerves, muscles, blood vessels), hypotension, headache, and epidural failure. Questions answered, consent signed. Patient agrees to elective labor epidural.          neg OB ROS.       Henrry Dallas MD

## 2023-11-19 NOTE — PROGRESS NOTES
Last dose of Cytotec given. Pt fer occasionally, baby tracing cat 1.  Dr Hairston updated and he said to let patient sleep till morning and will be evaluated.

## 2023-11-19 NOTE — ANESTHESIA PROCEDURE NOTES
Epidural catheter Procedure Note    Pre-Procedure   Staff -        Anesthesiologist:  Henrry Dallas MD       Performed By: anesthesiologist       Location: OB       Procedure Start/Stop Times: 11/19/2023 2:10 PM and 11/19/2023 2:27 PM       Pre-Anesthestic Checklist: patient identified, IV checked, risks and benefits discussed, informed consent, monitors and equipment checked, pre-op evaluation and at physician/surgeon's request  Timeout:       Correct Patient: Yes        Correct Procedure: Yes        Correct Site: Yes        Correct Position: Yes   Procedure Documentation  Procedure: epidural catheter       Diagnosis: labor pain for anticipated vaginal delivery       Patient Position: sitting       Patient Prep/Sterile Barriers: sterile gloves, mask, patient draped       Skin prep: Chloraprep       Local skin infiltrated with 3 mL of 1% lidocaine.        Insertion Site: L3-4. (midline approach).       Technique: LORT saline        KIRBY at 5 cm.       Needle Type: BioNitrogen       Needle Gauge: 18.        Needle Length (Inches): 3.5        Catheter: 20 G.          Catheter threaded easily.         6 cm epidural space.         Threaded 11 cm at skin.         # of attempts: 1 and  # of redirects:  0    Assessment/Narrative         Paresthesias: No.       Test dose of 3 mL lidocaine 1.5% w/ 1:200,000 epinephrine at.         Test dose negative, 3 minutes after injection, for signs of intravascular, subdural, or intrathecal injection.       Insertion/Infusion Method: LORT saline       Aspiration negative for Heme or CSF via Epidural Catheter.    Medication(s) Administered   0.25% Bupivacaine PF (Epidural) - EPIDURAL   5 mL - 11/19/2023 2:25:00 PM  Medication Administration Time: 11/19/2023 2:10 PM     Comments:  Patient requests labor epidural. Chart reviewed, including labs. Reviewed options and risks with the patient, including but not limited to: bleeding, infection, damage to tissues under the skin (nerves, muscles,  "blood vessels), hypotension, headache, and epidural failure. Questions answered, consent signed. Patient agrees to elective labor epidural.     Hands washed, sterile technique used, including scrub hat, mask, and gloves.     Smooth KIRBY to saline on 1st pass. Test dose negative for heme/SAB. No apparent complications.       FOR [facility] ([facility]) ONLY:   Pain Team Contact information: please page the Pain Team Via [employer]. Search \"Pain\". During daytime hours, please page the attending first. At night please page the resident first.      "

## 2023-11-20 ENCOUNTER — RECORDS - HEALTHEAST (OUTPATIENT)
Dept: HEALTH INFORMATION MANAGEMENT | Facility: CLINIC | Age: 33
End: 2023-11-20

## 2023-11-20 LAB
ALBUMIN SERPL BCG-MCNC: 2.8 G/DL (ref 3.5–5.2)
ALP SERPL-CCNC: 95 U/L (ref 40–150)
ALT SERPL W P-5'-P-CCNC: 10 U/L (ref 0–50)
AMPHETAMINES UR QL SCN: ABNORMAL
ANION GAP SERPL CALCULATED.3IONS-SCNC: 8 MMOL/L (ref 7–15)
AST SERPL W P-5'-P-CCNC: 11 U/L (ref 0–45)
BARBITURATES UR QL SCN: ABNORMAL
BENZODIAZ UR QL SCN: ABNORMAL
BILIRUB SERPL-MCNC: <0.2 MG/DL
BUN SERPL-MCNC: 10.6 MG/DL (ref 6–20)
BZE UR QL SCN: ABNORMAL
CALCIUM SERPL-MCNC: 8.3 MG/DL (ref 8.6–10)
CANNABINOIDS UR QL SCN: ABNORMAL
CHLORIDE SERPL-SCNC: 105 MMOL/L (ref 98–107)
CREAT SERPL-MCNC: 0.49 MG/DL (ref 0.51–0.95)
CREAT UR-MCNC: 82 MG/DL
DEPRECATED HCO3 PLAS-SCNC: 22 MMOL/L (ref 22–29)
EGFRCR SERPLBLD CKD-EPI 2021: >90 ML/MIN/1.73M2
ERYTHROCYTE [DISTWIDTH] IN BLOOD BY AUTOMATED COUNT: 12 % (ref 10–15)
FENTANYL UR QL: ABNORMAL
GLUCOSE SERPL-MCNC: 90 MG/DL (ref 70–99)
HCT VFR BLD AUTO: 30 % (ref 35–47)
HGB BLD-MCNC: 10.1 G/DL (ref 11.7–15.7)
MCH RBC QN AUTO: 30.8 PG (ref 26.5–33)
MCHC RBC AUTO-ENTMCNC: 33.7 G/DL (ref 31.5–36.5)
MCV RBC AUTO: 92 FL (ref 78–100)
OPIATES UR QL SCN: ABNORMAL
PCP QUAL URINE (ROCHE): ABNORMAL
PLATELET # BLD AUTO: 139 10E3/UL (ref 150–450)
POTASSIUM SERPL-SCNC: 4.1 MMOL/L (ref 3.4–5.3)
PROT SERPL-MCNC: 5.2 G/DL (ref 6.4–8.3)
RBC # BLD AUTO: 3.28 10E6/UL (ref 3.8–5.2)
SODIUM SERPL-SCNC: 135 MMOL/L (ref 135–145)
WBC # BLD AUTO: 9.9 10E3/UL (ref 4–11)

## 2023-11-20 PROCEDURE — 80307 DRUG TEST PRSMV CHEM ANLYZR: CPT | Performed by: OBSTETRICS & GYNECOLOGY

## 2023-11-20 PROCEDURE — 80348 DRUG SCREENING BUPRENORPHINE: CPT | Performed by: OBSTETRICS & GYNECOLOGY

## 2023-11-20 PROCEDURE — 85014 HEMATOCRIT: CPT | Performed by: OBSTETRICS & GYNECOLOGY

## 2023-11-20 PROCEDURE — 250N000013 HC RX MED GY IP 250 OP 250 PS 637: Performed by: OBSTETRICS & GYNECOLOGY

## 2023-11-20 PROCEDURE — 80355 GABAPENTIN NON-BLOOD: CPT | Performed by: OBSTETRICS & GYNECOLOGY

## 2023-11-20 PROCEDURE — 80053 COMPREHEN METABOLIC PANEL: CPT | Performed by: OBSTETRICS & GYNECOLOGY

## 2023-11-20 PROCEDURE — 80354 DRUG SCREENING FENTANYL: CPT | Performed by: OBSTETRICS & GYNECOLOGY

## 2023-11-20 PROCEDURE — 120N000001 HC R&B MED SURG/OB

## 2023-11-20 PROCEDURE — 80353 DRUG SCREENING COCAINE: CPT | Performed by: OBSTETRICS & GYNECOLOGY

## 2023-11-20 PROCEDURE — 99222 1ST HOSP IP/OBS MODERATE 55: CPT | Performed by: REGISTERED NURSE

## 2023-11-20 PROCEDURE — 36415 COLL VENOUS BLD VENIPUNCTURE: CPT | Performed by: OBSTETRICS & GYNECOLOGY

## 2023-11-20 PROCEDURE — 722N000001 HC LABOR CARE VAGINAL DELIVERY SINGLE

## 2023-11-20 RX ORDER — NIFEDIPINE 30 MG
30 TABLET, EXTENDED RELEASE ORAL DAILY
Status: DISCONTINUED | OUTPATIENT
Start: 2023-11-20 | End: 2023-11-22 | Stop reason: DRUGHIGH

## 2023-11-20 RX ORDER — DEXTROAMPHETAMINE SACCHARATE, AMPHETAMINE ASPARTATE MONOHYDRATE, DEXTROAMPHETAMINE SULFATE AND AMPHETAMINE SULFATE 1.25; 1.25; 1.25; 1.25 MG/1; MG/1; MG/1; MG/1
10 CAPSULE, EXTENDED RELEASE ORAL DAILY
Status: DISCONTINUED | OUTPATIENT
Start: 2023-11-21 | End: 2023-11-21

## 2023-11-20 RX ADMIN — IBUPROFEN 800 MG: 800 TABLET ORAL at 18:29

## 2023-11-20 RX ADMIN — ACETAMINOPHEN 650 MG: 325 TABLET ORAL at 15:47

## 2023-11-20 RX ADMIN — ACETAMINOPHEN 650 MG: 325 TABLET ORAL at 21:16

## 2023-11-20 RX ADMIN — ACETAMINOPHEN 650 MG: 325 TABLET ORAL at 10:21

## 2023-11-20 RX ADMIN — ACETAMINOPHEN 650 MG: 325 TABLET ORAL at 06:47

## 2023-11-20 RX ADMIN — HYDROXYZINE HYDROCHLORIDE 100 MG: 50 TABLET, FILM COATED ORAL at 19:00

## 2023-11-20 RX ADMIN — IBUPROFEN 800 MG: 800 TABLET ORAL at 00:22

## 2023-11-20 RX ADMIN — DOCUSATE SODIUM 100 MG: 100 CAPSULE, LIQUID FILLED ORAL at 08:20

## 2023-11-20 RX ADMIN — NIFEDIPINE 30 MG: 30 TABLET, EXTENDED RELEASE ORAL at 08:20

## 2023-11-20 RX ADMIN — IBUPROFEN 800 MG: 800 TABLET ORAL at 12:31

## 2023-11-20 RX ADMIN — IBUPROFEN 800 MG: 800 TABLET ORAL at 06:47

## 2023-11-20 ASSESSMENT — ACTIVITIES OF DAILY LIVING (ADL)
ADLS_ACUITY_SCORE: 20

## 2023-11-20 NOTE — ANESTHESIA POSTPROCEDURE EVALUATION
Patient: Amada Melo    Procedure: * No procedures listed *       Anesthesia Type:  Epidural    Note:  Disposition: Inpatient   Postop Pain Control: Uneventful            Sign Out: Well controlled pain   PONV: No   Neuro/Psych: Uneventful            Sign Out: Acceptable/Baseline neuro status   Airway/Respiratory: Uneventful            Sign Out: Acceptable/Baseline resp. status   CV/Hemodynamics: Uneventful            Sign Out: Acceptable CV status; No obvious hypovolemia; No obvious fluid overload   Other NRE: NONE   DID A NON-ROUTINE EVENT OCCUR? No    Event details/Postop Comments:  Epidural resolved, no back pain, no apparent neurologic complications.         Last vitals:  Vitals:    11/20/23 0400 11/20/23 0912 11/20/23 1150   BP: (!) 156/83 136/77 139/86   Pulse: 77     Resp: 18 16 16   Temp: 37.1  C (98.7  F) 36.7  C (98  F)    SpO2: 99% 99%        Electronically Signed By: Blayne Morris MD  November 20, 2023  2:07 PM

## 2023-11-20 NOTE — PLAN OF CARE
Called Dr. Dennis about Patients BP and exiting order to call when Systolic BP is greater 140 and diastolic is greater than 92   Per Dr. Dennis call if BP is above 160 /100 x 2. Linda Sanabria RN

## 2023-11-20 NOTE — PLAN OF CARE
Problem: Adult Inpatient Plan of Care  Goal: Plan of Care Review  Description: The Plan of Care Review/Shift note should be completed every shift.  The Outcome Evaluation is a brief statement about your assessment that the patient is improving, declining, or no change.  This information will be displayed automatically on your shift  note.  Outcome: Progressing  Flowsheets (Taken 11/20/2023 1200)  Plan of Care Reviewed With: patient    Maternal urine drug screen sent per patient request, patient was positive for methamphetamines on 11/13/23. Rolanda reports her PCP left clinic and could not find someone to resume care of her Adderal prescriptions and was on multiple wait lists for mental health visits. Rolanda states she used small amounts of methamphetamines because she could not get her prescription. She does want to see someone in hospital if able. Social work has been consulted and is following. This morning it was noted that patient used meth, and PEDS MD discussed risks of breastfeeding d/t chance of further withdrawal. Rolanda was tearful and upset about this, and requested another Urine drug screen. Initiated breast pumping, and not not feeding infant expressed breast milk at this time. Urine drug screen also ordered on infant per CPS request/MD order. Rolanda openly talks about her life situation with her health, children, and living situation. Continuing to offer support to Rolanda to prepare her for post partum discharge. Anticipate Rolanda will discharge home tomorrow.    Goal Outcome Evaluation:      Plan of Care Reviewed With: patient    Nicole Ceja RN on 11/20/2023 at 12:07 PM

## 2023-11-20 NOTE — L&D DELIVERY NOTE
OB Vaginal Delivery Note    Amada Melo MRN# [id number]   Age: 32 year old YOB: 1990       GA: 35w5d  GP:   Labor Complications:    EBL:   mL  Delivery QBL: 400 mL  Delivery Type: Vaginal, Spontaneous   ROM to Delivery Time: rupture date or rupture time have not been documented   Weight:     1 Minute 5 Minute 10 Minute   Apgar Totals: 8   9        JACKIE VILA;ISMAEL HUTCHINS;ERVIN RAMEY     Delivery Details:  Amada Melo, a 32 year old  female delivered a viable infant with apgars of 8  and 9 . Patient was fully dilated and pushing. Delivery was via vaginal, spontaneous  to a sterile field under   anesthesia. Infant delivered in vertex  left  occiput  anterior  position. Anterior and posterior shoulders delivered without difficulty. The cord was clamped, cut twice and   were noted. Cord blood was obtained in routine fashion with the following disposition:  .      Cord complications: none   Placenta delivered at 2023  5:51 PM . Placental disposition was Hospital disposal . Fundal massage performed and fundus found to be firm.     Episiotomy: none    Perineum, vagina, cervix were inspected, and the following lacerations were noted:   Perineal lacerations: none              .    Excellent hemostasis was noted. Needle count correct. Infant and patient in delivery room in good and stable condition.        Daron Melo-Amada [[id number]]      Labor Events     labor?: Yes   steroids: Full Course  Labor Type: Induction/Cervical ripening  Predominate monitoring during 1st stage: continuous electronic fetal monitoring, intermittent auscultation     Induction: Misoprostol  Induction date/time:      Cervical ripening date/time: 23 1052    Indications for induction: Gestational Hypertension     Augmentation: Oxytocin, AROM  Indications for augmentation: Hypertension       Delivery/Placenta Date and Time      Delivery Date: 23  Delivery Time:  5:46 PM   Placenta Date/Time: 2023  5:51 PM  Delivering clinician: Pipe Dennis MD   Other personnel present at delivery:  Provider Role   Kiana Bangura RN Stern, Amy L, RN Hartman, NAKIA Mcguire CNP              Vaginal Counts       Initial count performed by 2 team members:  Two Team Members   SIVAN Tristan Dr.         Needles Suture Needles Sponges (RETIRED) Instruments   Initial counts       Added to count       Relief counts       Final counts               Placed during labor Accounted for at the end of labor   FSE No NA   IUPC Yes Yes   Cervidil No NA                  Final count performed by 2 team members:  Two Team Members   SIVAN Tristan Dr.      Final count correct?: Yes      Pre-Birth Team Brief: Complete  Post-Birth Team Debrief: Complete       Apgars    Living status: Living   1 Minute 5 Minute 10 Minute 15 Minute 20 Minute   Skin color: 0  1       Heart rate: 2  2       Reflex irritability: 2  2       Muscle tone: 2  2       Respiratory effort: 2  2       Total: 8  9       Apgars assigned by: ARIEL YEE CNP       Cord          Cord Complications: None                       Delayed cord clamping?: Yes    Cord Clamping Delay (seconds): 31-60 seconds                       Scarbro Resuscitation    Methods: None       Scarbro Care at Delivery: NNP Delivery Note    Asked by Dr. Dennis to attend the delivery of this late , male infant with a gestational age of 35 5/7 weeks secondary to prematurity.      Infant delivered at 17:46 hours on 2023. Infant received delayed cord clamping for 55 seconds. Infant had spontaneous respirations at birth. He was placed on mom's chest, dried and stimulated at birth. Apgars were 8 at one minute and 9 at five minutes of age. Gross PE is WNL. Infant was shown to mother and father and will be transferred to the  nursery for further care.    NAKIA Rodriguez CNP 2023, 5:53 PM       Skin  to Skin and Feeding Plan      Skin to skin initiation date/time: 1/11/1841    Skin to skin with: Mother  Skin to skin end date/time:            Labor Events and Shoulder Dystocia    Fetal Tracing Prior to Delivery: Category 1                 Delivery (Maternal) (Provider to Complete) ([id number])    Episiotomy: None      Perineal lacerations: None    Repair suture: None  Genital tract inspection done: Pos       Blood Loss  Mother: Rolanda Melo #[id number]     Start of Mother's Information      Delivery Blood Loss  11/19/23 0546 - 11/19/23 1810      Delivery QBL (mL) Hospital Encounter 400 mL    Total  400 mL               End of Mother's Information  Mother: Rolanda Melo #[id number]                Delivery - Provider to Complete ([id number])    Delivering clinician: Pipe Dennis MD  Delivery Type (Choose the 1 that will go to the Birth History): Vaginal, Spontaneous                         Other personnel:  Provider Role   Kiana Bangura RN Stern, Amy L, RN Hartman, Radha Brush, APRN CNP                     Placenta    Date/Time: 11/19/2023  5:51 PM  Removal: Spontaneous  Disposition: Hospital disposal             Presentation and Position    Presentation: Vertex    Position: Left Occiput Anterior                     Pipe Dennis MD

## 2023-11-20 NOTE — CONSULTS
Initial Psychiatric Consult   Consult date: 2023         Reason for Consult, requesting source:    Patient request, history of ADHD    Requesting source: Gretchen Orozco    Labs and imaging reviewed. Provider consulted.     Total time spent in card review, patient interview and coordination of care: 60 minutes.    Telehealth was unavailable, so I spoke with patient over the telephone.          HPI:   Psychiatry seeing patient today, per patient request, and regarding a history of ADHD.     Per provider note: 33 yo  at 34+6 weeks presents to the hospital with elevated blood pressures. She has not been seen in clinic for the past 4 months. She called today with complaints of high blood pressure and swelling. She has a complicated social history.      Per additional chart review: Maternal urine drug screen sent per patient request, patient was positive for methamphetamines on 23. Rolanda reports her PCP left clinic and could not find someone to resume care of her Adderal prescriptions and was on multiple wait lists for mental health visits. Rolanda states she used small amounts of methamphetamines because she could not get her prescription. She does want to see someone in hospital if able. Social work has been consulted and is following. This morning it was noted that patient used meth, and PEDS MD discussed risks of breastfeeding d/t chance of further withdrawal. Rolanda was tearful and upset about this, and requested another Urine drug screen. Initiated breast pumping, and not not feeding infant expressed breast milk at this time. Urine drug screen also ordered on infant per CPS request/MD order. Rolanda openly talks about her life situation with her health, children, and living situation. Continuing to offer support to Rolanda to prepare her for post partum discharge. Anticipate Rolanda will discharge home tomorrow.     Today, patient reports that she has been off most of her mental  "health medications since she found out she was pregnant in March, including Paxil. She reports intermittently taking Adderall and gabapentin during her pregnancy. However, she was not able to have her Adderall refilled, as she was not able to coordinate an appointment with a provider. Patient shares that she began using \"the smallest amount\" of methamphetamines as a result. Patient denies SI/SIB, as well as AH/VH or other signs of psychosis.         Past Psychiatric History:   Patient previously had outpatient psychiatrist and therapist, but has not consistently been able to follow up with services. Has been treated for ADHD since she was a teenager, and chart review indicates that patient has been taking Adderall since at least 2013.        Substance Use and History:     Tobacco Use    Smoking status: Every Day     Packs/day: .5     Types: Cigarettes    Smokeless tobacco: Never    Tobacco comments:     5 cigs per day   Substance Use Topics    Alcohol use: Not Currently     Comment: Alcoholic Drinks/day: \"sometimes\" - not in pregnancy           Past Medical History:   PAST MEDICAL HISTORY:   Past Medical History:   Diagnosis Date    ADD (attention deficit disorder)     Genital herpes     Incomplete miscarriage 12/19/2022    Miscarriage 12/19/2022    PTSD (post-traumatic stress disorder)     Superficial thrombophlebitis during pregnancy in first trimester 11/24/2018    Formatting of this note might be different from the original. Consult Dr. Manuel; patient completed 45 days Lovenox.    Uncomplicated asthma        PAST SURGICAL HISTORY:   Past Surgical History:   Procedure Laterality Date    DILATION AND CURETTAGE  2018    DILATION AND CURETTAGE SUCTION N/A 12/18/2022    Procedure: DILATION AND CURETTAGE, UTERUS, USING SUCTION;  Surgeon: Alicia Mota MD;  Location: [facility] OR    ENT SURGERY      PELVIC EXAMINATION UNDER ANESTHESIA N/A 12/18/2022    Procedure: EXAM UNDER ANESTHESIA, GYNECOLOGIC;  " Surgeon: Alicia Mota MD;  Location: [facility] OR    TONSILLECTOMY      WISDOM TOOTH EXTRACTION               Family History:   FAMILY HISTORY:   Family History   Problem Relation Age of Onset    Depression Mother     Depression Father     Asthma Mother     Cervical Cancer Mother     Cerebrovascular Disease Maternal Grandmother     Hypercalcemia Paternal Grandmother     Hypertension Paternal Grandmother            Social History:   Patient has two children and lives independently.          Physical ROS:   The 10 point Review of Systems is negative other than noted in the HPI or here.           Medications:      docusate sodium  100 mg Oral Daily    hydrOXYzine  100 mg Oral At Bedtime    NIFEdipine ER OSMOTIC  30 mg Oral Daily    sodium chloride (PF)  3 mL Intracatheter Q8H    Tdap (tetanus-diphtheria-acell pertussis)  0.5 mL Intramuscular Once     Patient has been off Paxil since March 2023, when she found out she was pregnant.          Allergies:     Allergies   Allergen Reactions    Amoxicillin-Pot Clavulanate Hives    Adhesive Tape      Other reaction(s): Contact Dermatitis  blisters  Chemical burn like reaction to steri strips      Nickel      Skin irritation     Penicillins Hives    Steri Strips      blister          Labs:     Recent Results (from the past 48 hour(s))   CBC with platelets    Collection Time: 11/19/23  9:43 AM   Result Value Ref Range    WBC Count 11.8 (H) 4.0 - 11.0 10e3/uL    RBC Count 3.85 3.80 - 5.20 10e6/uL    Hemoglobin 11.7 11.7 - 15.7 g/dL    Hematocrit 35.4 35.0 - 47.0 %    MCV 92 78 - 100 fL    MCH 30.4 26.5 - 33.0 pg    MCHC 33.1 31.5 - 36.5 g/dL    RDW 12.2 10.0 - 15.0 %    Platelet Count 225 150 - 450 10e3/uL   CBC with platelets    Collection Time: 11/20/23  5:32 AM   Result Value Ref Range    WBC Count 9.9 4.0 - 11.0 10e3/uL    RBC Count 3.28 (L) 3.80 - 5.20 10e6/uL    Hemoglobin 10.1 (L) 11.7 - 15.7 g/dL    Hematocrit 30.0 (L) 35.0 - 47.0 %    MCV 92 78 - 100 fL  "   MCH 30.8 26.5 - 33.0 pg    MCHC 33.7 31.5 - 36.5 g/dL    RDW 12.0 10.0 - 15.0 %    Platelet Count 139 (L) 150 - 450 10e3/uL   Comprehensive metabolic panel    Collection Time: 11/20/23  5:32 AM   Result Value Ref Range    Sodium 135 135 - 145 mmol/L    Potassium 4.1 3.4 - 5.3 mmol/L    Carbon Dioxide (CO2) 22 22 - 29 mmol/L    Anion Gap 8 7 - 15 mmol/L    Urea Nitrogen 10.6 6.0 - 20.0 mg/dL    Creatinine 0.49 (L) 0.51 - 0.95 mg/dL    GFR Estimate >90 >60 mL/min/1.73m2    Calcium 8.3 (L) 8.6 - 10.0 mg/dL    Chloride 105 98 - 107 mmol/L    Glucose 90 70 - 99 mg/dL    Alkaline Phosphatase 95 40 - 150 U/L    AST 11 0 - 45 U/L    ALT 10 0 - 50 U/L    Protein Total 5.2 (L) 6.4 - 8.3 g/dL    Albumin 2.8 (L) 3.5 - 5.2 g/dL    Bilirubin Total <0.2 <=1.2 mg/dL   Urine Creatinine for Drug Screen Panel    Collection Time: 11/20/23 10:27 AM   Result Value Ref Range    Creatinine Urine for Drug Screen 82 mg/dL   Urine Drug Screen Panel    Collection Time: 11/20/23 10:27 AM   Result Value Ref Range    Amphetamines Urine Screen Negative Screen Negative    Barbituates Urine Screen Negative Screen Negative    Benzodiazepine Urine Screen Negative Screen Negative    Cannabinoids Urine Screen Negative Screen Negative    Cocaine Urine Screen Negative Screen Negative    Fentanyl Qual Urine Screen Positive (A) Screen Negative    Opiates Urine Screen Negative Screen Negative    PCP Urine Screen Negative Screen Negative          Physical and Psychiatric Examination:     /87   Pulse 85   Temp 98  F (36.7  C) (Oral)   Resp 18   Ht 1.803 m (5' 11\")   Wt 112.6 kg (248 lb 3.2 oz)   SpO2 98%   Breastfeeding Unknown   BMI 34.62 kg/m    Weight is 248 lbs 3.2 oz  Body mass index is 34.62 kg/m .    Physical Exam:  I have reviewed the physical exam as documented by by the medical team and agree with findings and assessment and have no additional findings to add at this time.         MSE:   Patient denies SI/SIB, as well as AH/VH. She " was tearful throughout our meeting, but was in no acute distress.              DSM-5 Diagnosis:   296.32 (F33.1) Major Depressive Disorder, Recurrent Episode, Moderate _  300.02 (F41.1) Generalized Anxiety Disorder  309.81 (F43.10) Posttraumatic Stress Disorder (includes Posttraumatic Stress Disorder for Children 6 Years and Younger)  Without dissociative symptoms  ADHD, by history and chart review          Assessment:   Patient was cooperative, but distracted during our meeting. Patient is truthful regarding her methamphetamine use, which was reportedly the result of her inability to obtain an updated prescription for Adderall. I do have concerns that patient is downplaying some possible underlying substance use. However, chart review does indicate that she has been on Adderall since at least 2013. Please refer to PDMP below. There is some conflicting evidence whether stimulants are safe in breast feeding, but benefit seems to outweigh the risk of her utilizing methamphetamines as an alternative treatment for her ADHD.     In general, breastfeeding is considered acceptable when the relative infant dose (RID) of the medication is <10% (UpToDate).    Gabapentin is present in breast milk, but data indicates that no adverse events were  reported in the  infants when mother was taking gabapentin. Relative infant dose (RID) of 1.3 to 3.8% compared to weight-adjusted maternal dose.     Paxil is present in breast milk and the RID of paroxetine <3 % of the weight-adjusted maternal dose. An alternative medication to Paxil would be Zoloft to target depression and anxiety. Sertraline is present in breast milk and relative infant dose (RID) of sertraline has been calculated in review articles to be 0.05% to 3.7% of the weight-adjusted maternal dose.     Although postpartum patients who start pharmacotherapy would ideally be treated with medications that were efficacious in the past, Zoloft tends to have a lower s/e  profile than Paxil.            Summary of Recommendations:   Will start PTA Adderall XR 10 mg in the morning for historical diagnosis of ADHD- benefit outweighs risk of patient returning to using methamphetamines  -Previous dose as of 3/27/2023 was 25 mg daily  -Starting at a lower dose due to patient currently receiving lactated ringers- can increase to 20 mg in the morning when LR complete.   -Patient will need to follow up with outpatient psychiatric provider for additional refills    Would consider starting Zoloft 50 mg daily for depression, anxiety and PTSD as an alternative to PTA Paxil.     Could restart PTA Gabapentin 100 mg BID and 300 mg at HS when creatinine is WNL (0.49 on 11/20/23).     Addendum: Will refer to outpatient therapy and psychiatry      Page me or re-consult psychiatry as needed.       Silvia Menidola, VADIM, APRN  Consult/Liaison Psychiatry  [facility]   Contact information available via [facility] Paging/Directory.  If I am not available, please call [facility] intake ([phone])

## 2023-11-20 NOTE — PROGRESS NOTES
"9:00 AM  ASHLEY reviewed chart and noted that SW met with pt on 11/18/2023 at which time pt admitted to \"amphetamine\" use one week ago due to being unable to get adderall while she was pregnant. From that note: Pt tearful and reports, \"I know more tests are going to come back and I am wondering what that means\".  SW clarified with Pt, she is referring to drug screening.  Pt reports she is prescribed adderal through provider at [facility] and was unable to get prescription during pregnancy and used amphetamines.  Pt expressed, \"I have just been trying to keep up with my kids and I can't do it all unmediated\".  Pt shares last use was about a week ago.     This SW reviewed pts tox screen results- no screen completed on pt during this admission. Pt was screened on 11/13/2023 during previous admission which was positive for amphetamines. The confirmatory screen resulted positive for methamphetamines. With this recent positive screen and pts admittance of use, ASHLEY placed call to [facility] and made report to Vonda. She requests that SW have hospital attempt to get urine drug screen on both mom and baby today but also requests written report be sent in. ASHLEY faxed written report documenting the above information. ASHLEY updated pts RN of this information and request for urine screen on mom and baby. ASHLEY will plan to meet with pt today to discuss further and await further direction from [facility]. ASHLEY will also follow for the baby's cord tox screen results.     11:00 AM  SW met with pt in her room to discuss needs and questions. RN also present during SW visit and OT came to room during visit to begin working with pts baby. In asking pt what her questions are the pt reports that she first wants to make sure that her baby is safe and healthy. ASHLEY validated this as a very important number one priority. Discussed that SW would work with pt on this and on helping her get what she needs to be healthy as well. ASHLEY " informed pt that SW did make a report to [facility] CPS this morning. Pt reports understanding of this. Discussed that typically they would do an assessment with the pt and that their goal is also making sure the baby is safe. Pt reports understanding. SW informed pt that SW would update her with any further information from the [facility] when known, but that they sometimes come to do the assessment without updating the SW. Pt reports understanding. SW encouraged her to be open and honest and to look at CPS involvement as a chance to get the help she needs. Pt receptive to this. Pt later in the visit informed SW that she had previous encounters with [facility] CPS related to reports with her older son. She reports that these were closed without any findings and he is still living with her. Pt expresses distress about her current situation and that both she and her children have been through a lot. Pt reporting that she would like to meet with psychiatry at the hospital to see about starting medications. RN was already aware of this and was present when pt informed SW of this information as well. Pt reports that she does not want to return to [facility] and [facility] but is hopeful she might be able to establish care with [facility]. She reports that due to having trouble managing appointments she wants all of her care to be in the same place. She reports plan for baby and her older kids to get established with [facility] for pediatrics as well. Pt asked about getting case management services. SW informed her that there is an option for that within [facility] for parents (Parent Support Outreach Program (PSOP)), but that if pt is working with CPS that they would have to do the case management and a referral to PSOP could be made later. Pt reports understanding. Pt reports a goal of getting herself and her 13 year old son on disability in the future. Pt requests information on pregnancy centers that  may be able to help with supplies for the baby. ASHLEY noted the information on supplies on the parent resources provided last week and also gave pt a list of pregnancy centers close to the hospital. Pt reports a need for a car seat. She reports that due to the loss of her daughter she did not feel able to prepare for the baby and get supplies. She reports having a car seat but notes that it is too big for the baby. She reports that she does not have anyone that she can call on to get a seat as she has a very minimal support system. Discussed that CPS may have resources for a car seat if they do get involved and that SW can reassess and possibly assist with a seat if CPS is unable to do so. Pt and RN report understanding. Pts baby will need a car seat test prior to discharge.    ASHLEY placed call to [facility] outpatient scheduling. ASHLEY informed by the  that pt will need to be established with a [place] PCP in order to have a [place] psychiatrist. She does report that the long term psychiatry providers have openings in the next week at this time, but pt will need to see a [place] PCP and have them send a referral in order to access that care. She reports that alternatively they can get pt scheduled with psychiatry in a clinic outside of [place], likely within the next week. ASHLEY will follow up with pt to discuss her preference.    2:30 PM  ASHLEY reviewed pts tox screen results from today which are positive for fentanyl but otherwise negative. ASHLEY reviewed with [facility] pharmacist who confirms pt had an epidural with fentanyl in it prior to the screen being collected. ASHLEY placed call to [facility] CPS intake and spoke with Rosemarie. She reports that pts case has been opened but not yet assigned to a worker. ASHLEY provided update on the tox screen done today. She requests that the results be faxed to them so they can be added to the report. She reports that a worker may still get out to see pt today but ASHLEY  "can check on the status tomorrow morning.     SW met with pt. Pt informs SW that her tox screen was negative which she is relieved by. She reports that she believes it backs up the information she has provided about her most recent use. SW updated her on the CPS information above. She reports understanding. SW updated her on the psychiatry appointments through [facility]. SW offered to get pt scheduled with a different clinic but pt requests instead to get a [facility] PCP appointment scheduled. She reports understanding that this will delay her accessing psychiatry care as she will need to see the PCP first and then they will send a referral. She reports understanding. She notes that she has some \"appointment anxiety\" and tends to miss appointments. She requests a telehealth appointment if possible and the [facility] if possible. SW noted this and offered to send a referral to the clinic SW to continue to follow and assist once she is discharged. Pt agreeable.    SW made coordinated care referral to request pt be scheduled for PCP appointment. SW will continue to follow to coordinate care with the pt and CPS.    3:30 PM  ASHLEY noted that PCP appointment scheduled and on AVS for 11/27/2023 at 5:00 PM with provider at [facility] via video visit. SW will plan to send [facility] referral at pts discharge for clinic care coordinator to follow and assist with getting pt referred to outpatient psychiatry once seen by PCP.    KJ Alvarenga      "

## 2023-11-20 NOTE — PROGRESS NOTES
Resting. HA a bit better  Small lochia  BP 150s/80s  Abd NT  DTRs 1+  PPD 1-will start Nifedipine 30 and watch another day.

## 2023-11-20 NOTE — LACTATION NOTE
Bedside RN states mom is pumping only at this time due to history of illicit drug use during pregnancy. Will call lactation if needed.

## 2023-11-20 NOTE — PROGRESS NOTES
Brought 10cc donar breast milk per FOB. Mom made 2 attempts to breast feed. Infant not latching well and sleepy. FOB sleeping on couch with baby in his arms. Mom sleepy as well. Educated parents on safe sleep and infant need to eat frequently due to LPI and size. Parents wanted donar milk left in room and would decide if not latching.

## 2023-11-20 NOTE — PLAN OF CARE
Problem: Adult Inpatient Plan of Care  Goal: Plan of Care Review  Description: The Plan of Care Review/Shift note should be completed every shift.  The Outcome Evaluation is a brief statement about your assessment that the patient is improving, declining, or no change.  This information will be displayed automatically on your shift  note.  11/20/2023 0544 by Linda Sanabria RN  Outcome: Progressing  Flowsheets (Taken 11/20/2023 0544)  Plan of Care Reviewed With: patient  Overall Patient Progress: improving  11/20/2023 0148 by Linda Sanabria RN  Outcome: Progressing     Problem: Adult Inpatient Plan of Care  Goal: Absence of Hospital-Acquired Illness or Injury  Intervention: Identify and Manage Fall Risk  Recent Flowsheet Documentation  Taken 11/20/2023 0400 by Linda Sanabria RN  Safety Promotion/Fall Prevention:   clutter free environment maintained   nonskid shoes/slippers when out of bed   patient and family education  Taken 11/20/2023 0030 by Linda Sanabria RN  Safety Promotion/Fall Prevention:   clutter free environment maintained   nonskid shoes/slippers when out of bed   patient and family education     Problem: Adult Inpatient Plan of Care  Goal: Optimal Comfort and Wellbeing  11/20/2023 0544 by Linda Sanabria RN  Outcome: Progressing  11/20/2023 0148 by Linda Sanabria RN  Outcome: Progressing  Intervention: Monitor Pain and Promote Comfort  Recent Flowsheet Documentation  Taken 11/20/2023 0022 by Linda Sanabria RN  Pain Management Interventions: medication (see MAR)  Intervention: Provide Person-Centered Care  Recent Flowsheet Documentation  Taken 11/20/2023 0400 by Linda Sanabria RN  Trust Relationship/Rapport:   care explained   choices provided   questions answered   questions encouraged   reassurance provided   thoughts/feelings acknowledged  Taken 11/20/2023 0030 by Lidna Sanabria RN  Trust Relationship/Rapport:   care explained   choices provided   questions answered    questions encouraged   reassurance provided   thoughts/feelings acknowledged     Problem: Labor  Goal: Acceptable Pain Control  11/20/2023 0544 by Linda Sanabria RN  Outcome: Progressing  11/20/2023 0148 by Linda Sanabria RN  Outcome: Progressing   Goal Outcome Evaluation:      Plan of Care Reviewed With: patient    Overall Patient Progress: improvingOverall Patient Progress: improving       Rolanda's BP 's are are consistently closer to severe and treatable range since 1030 am,   Vitals:    11/19/23 2034 11/19/23 2142 11/20/23 0020 11/20/23 0400   BP: (!) 157/80  (!) 159/79 (!) 156/83   BP Location:   Left arm Left arm   Patient Position:   Semi-Montalvo's Semi-Montalvo's   Cuff Size:   Adult Regular Adult Regular   Pulse:   90 77   Resp: 18  18 18   Temp:  98.9  F (37.2  C) 99  F (37.2  C) 98.7  F (37.1  C)   TempSrc:   Oral Oral   SpO2:   98% 99%   Weight:       Height:           Writer notified Dr. Jeannie MD, will like to be notified if BP are 160 /100 x 2 Patient denies headache, visual changes, epigastric pain. Shortness of breathe, no clonus. Trace edema.. she received Motrin 800 mg for uterine cramping.Mom breast feeding independently. She is bonding well with baby. Labs to be drawn at 0600.will continue to assess pt and her vitals as ordered. Notify provider with any concern or elevated BP per order. SIVAN García RN

## 2023-11-21 PROCEDURE — G0008 ADMIN INFLUENZA VIRUS VAC: HCPCS | Performed by: OBSTETRICS & GYNECOLOGY

## 2023-11-21 PROCEDURE — 250N000011 HC RX IP 250 OP 636: Performed by: OBSTETRICS & GYNECOLOGY

## 2023-11-21 PROCEDURE — 90686 IIV4 VACC NO PRSV 0.5 ML IM: CPT | Performed by: OBSTETRICS & GYNECOLOGY

## 2023-11-21 PROCEDURE — 90472 IMMUNIZATION ADMIN EACH ADD: CPT | Performed by: OBSTETRICS & GYNECOLOGY

## 2023-11-21 PROCEDURE — 250N000013 HC RX MED GY IP 250 OP 250 PS 637: Performed by: OBSTETRICS & GYNECOLOGY

## 2023-11-21 PROCEDURE — 90471 IMMUNIZATION ADMIN: CPT | Performed by: OBSTETRICS & GYNECOLOGY

## 2023-11-21 PROCEDURE — 120N000001 HC R&B MED SURG/OB

## 2023-11-21 PROCEDURE — 90715 TDAP VACCINE 7 YRS/> IM: CPT | Performed by: OBSTETRICS & GYNECOLOGY

## 2023-11-21 RX ADMIN — IBUPROFEN 800 MG: 800 TABLET ORAL at 21:02

## 2023-11-21 RX ADMIN — INFLUENZA A VIRUS A/VICTORIA/4897/2022 IVR-238 (H1N1) ANTIGEN (FORMALDEHYDE INACTIVATED), INFLUENZA A VIRUS A/DARWIN/9/2021 SAN-010 (H3N2) ANTIGEN (FORMALDEHYDE INACTIVATED), INFLUENZA B VIRUS B/PHUKET/3073/2013 ANTIGEN (FORMALDEHYDE INACTIVATED), AND INFLUENZA B VIRUS B/MICHIGAN/01/2021 ANTIGEN (FORMALDEHYDE INACTIVATED) 0.5 ML: 15; 15; 15; 15 INJECTION, SUSPENSION INTRAMUSCULAR at 10:35

## 2023-11-21 RX ADMIN — IBUPROFEN 800 MG: 800 TABLET ORAL at 02:06

## 2023-11-21 RX ADMIN — ACETAMINOPHEN 650 MG: 325 TABLET ORAL at 14:47

## 2023-11-21 RX ADMIN — ACETAMINOPHEN 650 MG: 325 TABLET ORAL at 02:06

## 2023-11-21 RX ADMIN — IBUPROFEN 800 MG: 800 TABLET ORAL at 08:52

## 2023-11-21 RX ADMIN — ACETAMINOPHEN 650 MG: 325 TABLET ORAL at 21:02

## 2023-11-21 RX ADMIN — ACETAMINOPHEN 650 MG: 325 TABLET ORAL at 06:00

## 2023-11-21 RX ADMIN — DOCUSATE SODIUM 100 MG: 100 CAPSULE, LIQUID FILLED ORAL at 08:48

## 2023-11-21 RX ADMIN — ACETAMINOPHEN 650 MG: 325 TABLET ORAL at 10:36

## 2023-11-21 RX ADMIN — IBUPROFEN 800 MG: 800 TABLET ORAL at 14:47

## 2023-11-21 RX ADMIN — HYDROXYZINE HYDROCHLORIDE 100 MG: 50 TABLET, FILM COATED ORAL at 21:03

## 2023-11-21 RX ADMIN — NIFEDIPINE 30 MG: 30 TABLET, EXTENDED RELEASE ORAL at 08:48

## 2023-11-21 RX ADMIN — CLOSTRIDIUM TETANI TOXOID ANTIGEN (FORMALDEHYDE INACTIVATED), CORYNEBACTERIUM DIPHTHERIAE TOXOID ANTIGEN (FORMALDEHYDE INACTIVATED), BORDETELLA PERTUSSIS TOXOID ANTIGEN (GLUTARALDEHYDE INACTIVATED), BORDETELLA PERTUSSIS FILAMENTOUS HEMAGGLUTININ ANTIGEN (FORMALDEHYDE INACTIVATED), BORDETELLA PERTUSSIS PERTACTIN ANTIGEN, AND BORDETELLA PERTUSSIS FIMBRIAE 2/3 ANTIGEN 0.5 ML: 5; 2; 2.5; 5; 3; 5 INJECTION, SUSPENSION INTRAMUSCULAR at 10:34

## 2023-11-21 ASSESSMENT — ACTIVITIES OF DAILY LIVING (ADL)
ADLS_ACUITY_SCORE: 20

## 2023-11-21 NOTE — CONSULTS
8:40 AM  ASHLEY spoke with [facility] intake who report case was assigned to Caroline Mckeon ([phone]). ASHLEY checked in with pts RN who reports that the CPS worker had left the hospital not long before SW arrived on the floor. ASHLEY attempted to call Caroline at the listed number. The call went straight to . SW left  requesting call back to discuss coordination of care and CPS plan.    9:50 AM  ASHLEY notified by RN that pt scored 16 on [place] Depression screen. ASHLEY noted that pt has been seen by both ASHLEY and psychiatry during the hospitalization with plan for outpatient psychiatry follow up which pt is agreeable to. ASHLEY will plan to follow up with pt again prior to discharge to confirm plans for outpatient support. ASHLEY attempted to call Caroline again and the call went straight to . ASHLEY called back to [facility] intake to attempt to get a different phone number. ASHLEY provided cell number of [phone]. ASHLEY placed call to this number and left a  requesting call back.    ASHLEY received call back from Caroline [facility] : She reports that she is planning for the baby to discharge to home with mom. She reports that they do not have any resources for providing a car seat. She reports that she will be sending referrals for services for the pt in the community including [facility] and [facility] services for connection to mental health support. She confirms that ASHLEY cannot send a PSOP referral while the CPS case is open. ASHLEY informed her that ASHLEY would send urine tox screen results for the baby which were positive for fentanyl. ASHLEY reviewed with [facility] pharmacist yesterday and pt was given fentanyl in her epidural prior to delivery. Discussed that ASHLEY would send cord screen results on the baby when they are back as well but it will likely be another day or two before they are resulted. Caroline reports understanding.     ASHLEY received approval to provide pt with infant car seat. ASHLEY met  with pt. Pt confirms that she met with Caroline this morning. She reports that Caroline was making referrals for her and also some for support for her older sons. She reports that Caroline is planning to visit her at her aunt's house after discharge. Pt reports that she is feeling better this morning with some of the uncertainty of the situation being resolved. SW informed her of the PCP appointment scheduled for Monday with provider at the [facility], discussed that information on the appointment will be in pts discharge paperwork. Discussed that they can refer her to outpatient psychiatry through [facility]. Also discussed that SW will send clinic SW referral. Pt reports understanding and agreement. Pt reports that she has made calls to the [place] [employer] regarding a car seat. Pt received call back from them during SW visit and they provided a couple of resources to pt for getting a car seat, but both would not be an immediate turn around to get a seat at the hospital. After call SW informed pt that SW is able to provide an infant car seat. SW offered to provide food box as well and pt declines due to specific food needs for her kids. She reports that they are managing okay on food stamps and visiting a food shelf in [place]. Car seat provided along with other baby supplies from RN Care Coordinator. Pt very appreciative. Pt denies other SW needs or questions at this time but understands that SW remains available while she is hospitalized.    SW sent clinic care coordination referral for both mom and baby in anticipation of potential discharge later today. No further SW needs identified at this time but SW remains available during pts hospitalization.    Diana Yadav, [facility]     Contacts:  Caroline Mckeon [facility] CPS - [phone]

## 2023-11-21 NOTE — PROGRESS NOTES
Social work notified of EPNDS of 16, SW and psych already following. Nicole Ceja RN on 11/21/2023 at 9:59 AM

## 2023-11-21 NOTE — PROGRESS NOTES
OB Post Partum Note    ASSESSMENT: PLAN:     PPD#2   spontaneous vaginal delivery  Gestational hyeprtension- started meds yesterday- still with elevated pressures- will continue to monitor- may need change inmedicaiton    Doing well  Routine cares  Anticipate discharge to home in am    SUBJECTIVE:   no complaints, denies fever, chills.  Tolerating po, ambulating.  Baby doing well    OBJECTIVE:  Vitals:    11/20/23 2330 11/21/23 0430 11/21/23 0754 11/21/23 0920   BP: (!) 142/90 139/81 (!) 139/98    BP Location: Right arm  Right arm    Patient Position: Semi-Montalvo's      Cuff Size: Adult Regular      Pulse: 100 84 112    Resp: 20 18 18    Temp: 98.9  F (37.2  C)  99.5  F (37.5  C)    TempSrc: Oral  Oral    SpO2: 98%  98%    Weight:    110.5 kg (243 lb 11.2 oz)   Height:               abd- fundus firm  Ext- no tenderness,   cv- regular rate  Lungs- clear    Libby Blue MD  [facility]  [phone]

## 2023-11-21 NOTE — PROGRESS NOTES
"This writer was able to spend a lot of time with patient utilizing active listening. Patient reports that she doesn't have a strong support system. Rolanda has been tearful most of the day. This writer was able to get an order for a psych consult for patient while in hospital. Patient reports her mental health has not been well managed due to providers leaving and not having access to medications that she takes for depression, anxiety, ADHD, and PTSD. She reports that this is all she has been asking for was to get mental health help, but has struggled with special needs children and all of life's events. Rolanda was able to have a telephone visit this evening, however she was distracted with testing and a video visit with the father of the baby. Rolanda reports that the father of the baby \"Matt\" and her are not romantically involved, and were friends and business partners. She verbalized they he doesn't have self awareness of emotions which makes things challenging for her. She reports that she doesn't have an outlet to talk or vent. She states, \"I will do anything SW/CPS asks of me, including daily drug screens if necessary.\" Discussed the importance of filling out the EPNDS form tonight. Patient encouraged to pump every 2-3 hours as Raul is taking a bottle. She has pumped once today, she states she has had so much going on with staff in and out of the room today. Nicole Ceja RN on 11/20/2023 at 6:55 PM    "

## 2023-11-21 NOTE — CONSULTS
Psychiatry Consultation; Follow up       Referrals for outpatient psychiatry and therapy have been made and will appear in patient's AVS.      Restarted PTA Adderall XR 10 mg for historical diagnosis of ADHD- benefit outweighs risk of patient returning to using methamphetamines  -Previous dose as of 3/27/2023 was 25 mg daily  -Starting at a lower dose due to patient currently receiving lactated ringers- can increase to 20 mg in the morning when LR complete.   -Patient will need to follow up with outpatient psychiatric provider for additional refills    Would consider starting Zoloft 50 mg daily for depression, anxiety and PTSD as an alternative to PTA Paxil.     If patient is still admitted to the hospital, I will plan to see her in person 11/22/23.    Addendum: I spoke with patient's nurse, and patient would like to try an alternative medication to PTA Adderall for treating her ADHD. There are possible risks to infant associated when breastfeeding, even with non-stimulant medications for the treatment of ADHD. However, I will discuss possible options with patient when I see her on 11/22/23.       Page me or re-consult psychiatry as needed.       Silvia Mendiola, VICTORIANOP, APRN  Consult/Liaison Psychiatry  [facility]   Contact information available via [facility] Paging/Directory.  If I am not available, please call [facility] intake ([phone])

## 2023-11-21 NOTE — PLAN OF CARE
Pt is managing pain with tylenol and ibuprofen.   Postpartum assessment WNL.  Had some higher BP's during evening but now 0430 BP normotensive.  Would like to discuss with pediatrician regarding Adderall and BF contraindications.  Sticky note to pediatrician placed for them to discuss w/ pt.      Spent 3 hours in room with patient at beginning of shift and she was very teary. Pt feels overwhelmed w/ FOB as she is trying to allow him to be part of 's life but he is also going through his own personal situation making communication hard between the two of them. Support system isn't great either which makes her stressed as she has two high needs children at home.  Pt encouraged to work with SW and verbalize her needs so nursing can better support her, verbalize understanding. [facility] also provided financial, housing, and  carseat resources during evening.     Pt will connect w/ resources provided to her by [facility] to coordinate getting a car seat.    Problem: Adult Inpatient Plan of Care  Goal: Optimal Comfort and Wellbeing  Outcome: Progressing  Intervention: Monitor Pain and Promote Comfort  Recent Flowsheet Documentation  Taken 2023 by Ten Gallo RN  Pain Management Interventions:   rest   repositioned  Intervention: Provide Person-Centered Care  Recent Flowsheet Documentation  Taken 2023 2330 by Ten Gallo RN  Trust Relationship/Rapport:   care explained   choices provided   emotional support provided   empathic listening provided   questions answered   questions encouraged   reassurance provided   thoughts/feelings acknowledged  Taken 2023 by Ten Gallo, RN  Trust Relationship/Rapport:   care explained   choices provided   emotional support provided   empathic listening provided   questions answered   questions encouraged   reassurance provided   thoughts/feelings acknowledged

## 2023-11-21 NOTE — PLAN OF CARE
"  Problem: Adult Inpatient Plan of Care  Goal: Plan of Care Review  Description: The Plan of Care Review/Shift note should be completed every shift.  The Outcome Evaluation is a brief statement about your assessment that the patient is improving, declining, or no change.  This information will be displayed automatically on your shift  note.  Outcome: Progressing  Flowsheets (Taken 11/21/2023 1259)  Plan of Care Reviewed With: patient  Overall Patient Progress: improving  Goal: Patient-Specific Goal (Individualized)  Description: You can add care plan individualizations to a care plan. Examples of Individualization might be:  \"Parent requests to be called daily at 9am for status\", \"I have a hard time hearing out of my right ear\", or \"Do not touch me to wake me up as it startles  me\".  Outcome: Progressing  Goal: Absence of Hospital-Acquired Illness or Injury  Outcome: Progressing  Intervention: Identify and Manage Fall Risk  Recent Flowsheet Documentation  Taken 11/21/2023 0850 by Cynthia Randle RN  Safety Promotion/Fall Prevention: nonskid shoes/slippers when out of bed  Intervention: Prevent Skin Injury  Recent Flowsheet Documentation  Taken 11/21/2023 0850 by Cynthia Randle RN  Body Position: position changed independently  Goal: Optimal Comfort and Wellbeing  Outcome: Progressing  Intervention: Monitor Pain and Promote Comfort  Recent Flowsheet Documentation  Taken 11/21/2023 0852 by Cynthia Randle RN  Pain Management Interventions: medication (see MAR)  Intervention: Provide Person-Centered Care  Recent Flowsheet Documentation  Taken 11/21/2023 0850 by Cynthia Randle RN  Trust Relationship/Rapport:   care explained   choices provided   emotional support provided   empathic listening provided   questions answered   questions encouraged   reassurance provided   thoughts/feelings acknowledged  Goal: Readiness for Transition of Care  Outcome: Progressing   Goal Outcome Evaluation:      Plan of Care " Reviewed With: patient    Overall Patient Progress: improvingOverall Patient Progress: improving    Patient is feeling better today now that more of the uncertainties are resolved.  She has received essential baby supplies for home like a car seat, diapers, pack and play, and an outfit for Baby Raul to go home in.  Her blood pressure has improved from this morning to noon with the Nifedipine.  Reflexes are normal and perineal pain is minimal with the help of consistent Ibuprofen and Tylenol.  She has been seen by psychiatry and social work regarding her social challenges and her high score of PPMA.

## 2023-11-21 NOTE — LACTATION NOTE
This note was copied from a baby's chart.  This writer met with Amada per request.  She was attempting to breastfeed her 35-5/7 weeks (at birth) gestation infant.  Infant had been crying loudly during a diaper change and Amada was able to calm infant prior to the feeding.  Reviewed proper postioning of infant at the breast in the cross cradle hold, shaping her breast tissue to match infant's mouth.  Amada has flat nipples; however, the areolar tissue is soft and pliable and easily shaped to fit infant's mouth.  Infant able to latch onto the breast but unable to sustain the latch.  Infant would take 2-3 sucks then slip off the breast.  Amada offered a nipple shield but declined, stating she had tried the nipple shield before and found it very painful on her nipple.  After attempting each breast for 1-2 minutes each, she ended the feeding at the breast and bottled fed infant with Dr. Reddy bottle in side lying position.  Instructed Amada to pump her breasts for every bottle infant receives, to help build and protect her milk supply.   She states she has not been pumping 8 times in 24 hours but will try to start pumping more frequently.   She reports pumping full time for her, now 2 year old, when that child was born.  She states she pumped for over 1.5 years and had a full milk supply.  She was encouraged to follow up at outpatient lactation clinic to assess infant's ability to transfer milk from the breast.  Anticipatory guidance given, informing Amada many infant's are usually 38-1/2 weeks gestation corrected age and at least 6-1/2 pounds before they can sustain a deep latch and transfer adequate amounts of milk from the breast at every feeding, eight times in 24 hours.  Infant may do well at some feedings then need a supplement after other breastfeeding's.  Instructed to pump breasts for every bottle infant receives, to help protect milk supply.  Amada verbalizes understanding of all education  given.  She denies any further questions.  Reviewed care plan below in detail.        Care Plan Breastfeeding Late /Low Birth Weight Baby   May struggle to sustain a latch due to thinner fat pads in cheeks  May have decreased energy to stay at breast long enough to get enough milk  Decreased milk transfer over time will result in infant weight loss and low milk supply    Feeding Plan  Hand express, as needed  Breastfeed 8-12+ times in 24 hours  Watch for:   Rhythmic sucking and swallowing during feeding  Content baby after feeding  Adequate wet & dirty diapers (per feeding log)    Supplement If infant does not latch or is sleepy at breast, end breastfeeding and feed expressed milk using the amounts below as a guideline; give more as baby cues. If necessary, make up the difference with donor milk or formula as a bridge until milk supply increases:    0-24 hours 2-10 ml each feeding  24-48 hours 5-15 ml each feeding  48-72 hours 15-30 ml each feeding  72-96 hours 30-60 ml each feeding    Pump after feedings to stimulate milk production until milk supply well established & baby breastfeeding with rhythmic sucking and swallowing (10-20 minutes), adequate output, and weight gain.    Contact Outpatient Lactation Clinic after discharge as needed.  [phone]                  2021

## 2023-11-21 NOTE — PROVIDER NOTIFICATION
Dr. Marinelli notified regarding BP of 166/97 at 1600 with recheck 148/87 at 1615. Denies headache, vision changes, and right upper quadrant pain. No clonus noted. Reflexes normal. Dr. Marinelli orders to check BP in another hour and notify if 160/110 or higher.

## 2023-11-22 PROBLEM — O14.10 PREECLAMPSIA, SEVERE: Status: ACTIVE | Noted: 2023-11-22

## 2023-11-22 LAB — GABAPENTIN UR QL CFM: PRESENT

## 2023-11-22 PROCEDURE — 120N000001 HC R&B MED SURG/OB

## 2023-11-22 PROCEDURE — 250N000013 HC RX MED GY IP 250 OP 250 PS 637: Performed by: OBSTETRICS & GYNECOLOGY

## 2023-11-22 PROCEDURE — 99233 SBSQ HOSP IP/OBS HIGH 50: CPT | Performed by: REGISTERED NURSE

## 2023-11-22 PROCEDURE — 250N000013 HC RX MED GY IP 250 OP 250 PS 637: Performed by: REGISTERED NURSE

## 2023-11-22 RX ORDER — NALOXONE HYDROCHLORIDE 0.4 MG/ML
0.4 INJECTION, SOLUTION INTRAMUSCULAR; INTRAVENOUS; SUBCUTANEOUS
Status: DISCONTINUED | OUTPATIENT
Start: 2023-11-22 | End: 2023-11-23 | Stop reason: HOSPADM

## 2023-11-22 RX ORDER — NIFEDIPINE 30 MG
60 TABLET, EXTENDED RELEASE ORAL DAILY
Status: DISCONTINUED | OUTPATIENT
Start: 2023-11-22 | End: 2023-11-23 | Stop reason: HOSPADM

## 2023-11-22 RX ORDER — IBUPROFEN 600 MG/1
600 TABLET, FILM COATED ORAL EVERY 6 HOURS PRN
Qty: 40 TABLET | Refills: 0 | Status: SHIPPED | OUTPATIENT
Start: 2023-11-22

## 2023-11-22 RX ORDER — OXYCODONE HYDROCHLORIDE 5 MG/1
5 TABLET ORAL EVERY 4 HOURS PRN
Status: DISCONTINUED | OUTPATIENT
Start: 2023-11-22 | End: 2023-11-23 | Stop reason: HOSPADM

## 2023-11-22 RX ORDER — GABAPENTIN 100 MG/1
100 CAPSULE ORAL 3 TIMES DAILY PRN
Status: DISCONTINUED | OUTPATIENT
Start: 2023-11-22 | End: 2023-11-23 | Stop reason: HOSPADM

## 2023-11-22 RX ORDER — METHYLPHENIDATE HYDROCHLORIDE 18 MG/1
18 TABLET ORAL DAILY
Status: DISCONTINUED | OUTPATIENT
Start: 2023-11-22 | End: 2023-11-23 | Stop reason: HOSPADM

## 2023-11-22 RX ORDER — ACETAMINOPHEN 500 MG
500-1000 TABLET ORAL EVERY 6 HOURS PRN
Qty: 60 TABLET | Refills: 0 | Status: SHIPPED | OUTPATIENT
Start: 2023-11-22

## 2023-11-22 RX ORDER — SERTRALINE HYDROCHLORIDE 25 MG/1
50 TABLET, FILM COATED ORAL DAILY
Status: DISCONTINUED | OUTPATIENT
Start: 2023-11-22 | End: 2023-11-23 | Stop reason: HOSPADM

## 2023-11-22 RX ORDER — NALOXONE HYDROCHLORIDE 0.4 MG/ML
0.2 INJECTION, SOLUTION INTRAMUSCULAR; INTRAVENOUS; SUBCUTANEOUS
Status: DISCONTINUED | OUTPATIENT
Start: 2023-11-22 | End: 2023-11-23 | Stop reason: HOSPADM

## 2023-11-22 RX ORDER — GABAPENTIN 100 MG/1
100 CAPSULE ORAL 3 TIMES DAILY PRN
Qty: 90 CAPSULE | Refills: 0 | Status: SHIPPED | OUTPATIENT
Start: 2023-11-22 | End: 2023-12-22

## 2023-11-22 RX ADMIN — ACETAMINOPHEN 650 MG: 325 TABLET ORAL at 23:04

## 2023-11-22 RX ADMIN — ACETAMINOPHEN 650 MG: 325 TABLET ORAL at 05:14

## 2023-11-22 RX ADMIN — ACETAMINOPHEN 650 MG: 325 TABLET ORAL at 08:21

## 2023-11-22 RX ADMIN — NIFEDIPINE 60 MG: 30 TABLET, EXTENDED RELEASE ORAL at 08:00

## 2023-11-22 RX ADMIN — OXYCODONE HYDROCHLORIDE 5 MG: 5 TABLET ORAL at 21:21

## 2023-11-22 RX ADMIN — DOCUSATE SODIUM 100 MG: 100 CAPSULE, LIQUID FILLED ORAL at 08:00

## 2023-11-22 RX ADMIN — SERTRALINE HYDROCHLORIDE 50 MG: 25 TABLET ORAL at 11:28

## 2023-11-22 RX ADMIN — IBUPROFEN 800 MG: 800 TABLET ORAL at 20:15

## 2023-11-22 RX ADMIN — ACETAMINOPHEN 650 MG: 325 TABLET ORAL at 18:05

## 2023-11-22 RX ADMIN — IBUPROFEN 800 MG: 800 TABLET ORAL at 03:38

## 2023-11-22 RX ADMIN — ACETAMINOPHEN 650 MG: 325 TABLET ORAL at 12:57

## 2023-11-22 RX ADMIN — METHYLPHENIDATE HYDROCHLORIDE 18 MG: 18 TABLET, EXTENDED RELEASE ORAL at 11:28

## 2023-11-22 RX ADMIN — ACETAMINOPHEN 650 MG: 325 TABLET ORAL at 01:09

## 2023-11-22 RX ADMIN — IBUPROFEN 800 MG: 800 TABLET ORAL at 14:30

## 2023-11-22 RX ADMIN — IBUPROFEN 800 MG: 800 TABLET ORAL at 08:20

## 2023-11-22 ASSESSMENT — ACTIVITIES OF DAILY LIVING (ADL)
ADLS_ACUITY_SCORE: 20

## 2023-11-22 NOTE — PLAN OF CARE
"  Problem: Adult Inpatient Plan of Care  Goal: Plan of Care Review  Description: The Plan of Care Review/Shift note should be completed every shift.  The Outcome Evaluation is a brief statement about your assessment that the patient is improving, declining, or no change.  This information will be displayed automatically on your shift  note.  Outcome: Progressing  Flowsheets (Taken 11/22/2023 1139)  Plan of Care Reviewed With: patient  Overall Patient Progress: improving  Goal: Patient-Specific Goal (Individualized)  Description: You can add care plan individualizations to a care plan. Examples of Individualization might be:  \"Parent requests to be called daily at 9am for status\", \"I have a hard time hearing out of my right ear\", or \"Do not touch me to wake me up as it startles  me\".  Outcome: Progressing  Goal: Absence of Hospital-Acquired Illness or Injury  Outcome: Progressing  Intervention: Prevent Skin Injury  Recent Flowsheet Documentation  Taken 11/22/2023 0820 by Cynthia Randle RN  Body Position: position changed independently  Intervention: Prevent Infection  Recent Flowsheet Documentation  Taken 11/22/2023 0820 by Cynthia Randle RN  Infection Prevention:   rest/sleep promoted   personal protective equipment utilized   hand hygiene promoted  Goal: Optimal Comfort and Wellbeing  Outcome: Progressing  Intervention: Monitor Pain and Promote Comfort  Recent Flowsheet Documentation  Taken 11/22/2023 0821 by Cynthia Randle RN  Pain Management Interventions: medication (see MAR)  Intervention: Provide Person-Centered Care  Recent Flowsheet Documentation  Taken 11/22/2023 0820 by Cynthia Randle RN  Trust Relationship/Rapport:   care explained   choices provided   emotional support provided   empathic listening provided   questions answered   questions encouraged   reassurance provided   thoughts/feelings acknowledged  Goal: Readiness for Transition of Care  Outcome: Progressing   Goal Outcome " Evaluation:      Plan of Care Reviewed With: patient    Overall Patient Progress: improvingOverall Patient Progress: improving     Patient has had elevated blood pressures.  Nifedipine was increased this morning from 30mg to 60mg and blood pressures have improved but Dr. Blue would like to keep her overnight to watch her closely since this change was made.  Patient will discharge tomorrow.  Her baby will need to repeat car seat test on night shift in nicu tonight.  Patient has been updated on this decision to keep her here.

## 2023-11-22 NOTE — PLAN OF CARE
Problem: Adult Inpatient Plan of Care  Goal: Plan of Care Review  Description: The Plan of Care Review/Shift note should be completed every shift.  The Outcome Evaluation is a brief statement about your assessment that the patient is improving, declining, or no change.  This information will be displayed automatically on your shift  note.  Outcome: Progressing  Flowsheets (Taken 11/22/2023 1744)  Plan of Care Reviewed With: patient    Patient here for blood pressure observation. Nifedipine was increased due to elevated blood pressures this morning. Anticipate discharge tomorrow.    Goal Outcome Evaluation:      Plan of Care Reviewed With: patient  Nicole Ceja RN on 11/22/2023 at 5:46 PM

## 2023-11-22 NOTE — CONSULTS
Psychiatry Consultation; Follow up              Reason for Consult, requesting source:    Follow up    Requesting source: Gretchen Orozco    Labs and imaging reviewed. Nursing and provider consulted.     Total time spent in chart review, patient interview and coordination of care: 60 minutes              Interim history:    Psychiatry following up with patient today.    Per provider note: 31 yo  at 34+6 weeks presents to the hospital with elevated blood pressures. She has not been seen in clinic for the past 4 months. She called today with complaints of high blood pressure and swelling. She has a complicated social history.      Per additional chart review: Maternal urine drug screen sent per patient request, patient was positive for methamphetamines on 23. Rolanda reports her PCP left clinic and could not find someone to resume care of her Adderal prescriptions and was on multiple wait lists for mental health visits. Rolanda states she used small amounts of methamphetamines because she could not get her prescription. She does want to see someone in hospital if able. Social work has been consulted and is following. This morning it was noted that patient used meth, and PEDS MD discussed risks of breastfeeding d/t chance of further withdrawal. Rolanda was tearful and upset about this, and requested another Urine drug screen. Initiated breast pumping, and not not feeding infant expressed breast milk at this time. Urine drug screen also ordered on infant per CPS request/MD order. Rolanda openly talks about her life situation with her health, children, and living situation. Continuing to offer support to Rolanda to prepare her for post partum discharge. Anticipate Rolanda will discharge home tomorrow.     Per my note from : Today, patient reports that she has been off most of her mental health medications since she found out she was pregnant in March, including Paxil. She reports intermittently taking  "Adderall and gabapentin during her pregnancy. However, she was not able to have her Adderall refilled, as she was not able to coordinate an appointment with a provider. Patient shares that she began using \"the smallest amount\" of methamphetamines as a result. Patient denies SI/SIB, as well as AH/VH or other signs of psychosis.    Today, patient sitting up in bed feeding her son, Raul, when I arrived. She reports that she would like to restart gabapentin at a lower dose for intermittent anxiety. Patient is also hoping to start Zoloft, as opposed to Paxil for depression and anxiety. She shares that her anxiety is often exacerbated by the fact that her focus and attention is greatly impacted without her ADHD medications. We discussed patient concerns regarding restarting Adderall while breastfeeding, and patient is agreeable to trying Concerta. Patient denies SI/SIB, as well as AH/VH.         Current Medications:      docusate sodium  100 mg Oral Daily    hydrOXYzine  100 mg Oral At Bedtime    NIFEdipine ER OSMOTIC  60 mg Oral Daily    sodium chloride (PF)  3 mL Intracatheter Q8H              MSE:   Appearance: awake, alert  Attitude:  cooperative  Eye Contact:  fair  Mood:  \"fair\"  Affect:  slightly restricted  Speech:  clear, coherent  Psychomotor Behavior:  no evidence of tardive dyskinesia, dystonia, or tics  Muscle strength and tone: Appears average  Thought Process:  logical and linear  Associations:  no loose associations  Thought Content:  no evidence of suicidal ideation or homicidal ideation, no evidence of psychotic thought, no auditory hallucinations present, and no visual hallucinations present  Insight:  fair  Judgement:  intact  Oriented to:  time, person, and place  Attention Span and Concentration:  fair  Recent and Remote Memory:  intact    Vital signs:  Temp: 98.1  F (36.7  C) Temp src: Oral BP: (!) 148/93 (I notified the nurse) Pulse: 101   Resp: 18 SpO2: 98 % O2 Device: None (Room air)   " "Height: 180.3 cm (5' 11\") Weight: 110.4 kg (243 lb 4.8 oz)  Estimated body mass index is 33.93 kg/m  as calculated from the following:    Height as of this encounter: 1.803 m (5' 11\").    Weight as of this encounter: 110.4 kg (243 lb 4.8 oz).    Qtc: No current EKG         DSM-5 Diagnosis:   296.32 (F33.1) Major Depressive Disorder, Recurrent Episode, Moderate _  300.02 (F41.1) Generalized Anxiety Disorder  309.81 (F43.10) Posttraumatic Stress Disorder (includes Posttraumatic Stress Disorder for Children 6 Years and Younger)  Without dissociative symptoms  ADHD, by history and chart review          Assessment:   Patient was calm and pleasant during our meeting and appeared to bonding appropriately with her son, Raul. She shows appropriate concern regarding his care and the medications she chooses to take while she is breastfeeding. We discussed restarting PTA gabapentin, as well as starting Zoloft for depression and Concerta for historical diagnosis of ADHD. Patient was previously on Adderall, with positive results. However, she would like to try a non-stimulant medication while breastfeeding. In general, breastfeeding is considered acceptable when the relative infant dose (RID) of the medication is <10% (UpToDate). Gabapentin is present in breast milk, but data indicates that no adverse events were  reported in the  infants when mother was taking gabapentin. Relative infant dose (RID) of 1.3 to 3.8% compared to weight-adjusted maternal dose. Sertraline is present in breast milk and relative infant dose (RID) of sertraline has been calculated in review articles to be 0.05% to 3.7% of the weight-adjusted maternal dose.             Summary of Recommendations:   Started Zoloft 50 mg for depression and anxiety - lower s/e profile than PTA Paxil    Started Concerta 18 mg daily in the morning as non-stimulant alternative to PTA Adderall    Referrals for outpatient therapy and psychiatry have been made "         Page me or re-consult psychiatry as needed (psychiatry is signing off).       Silvia Mendiola, VICTORIANOP, APRN  Consult/Liaison Psychiatry  [facility]   Contact information available via [facility] Paging/Directory.  If I am not available, please call [facility] intake ([phone])

## 2023-11-22 NOTE — DISCHARGE INSTRUCTIONS
"Know Your Blood Pressure Numbers  For patients who've had a high blood pressure disorder of pregnancy  What to know about high blood pressure disorders of pregnancy  People who had high blood pressure during pregnancy may continue to have high blood pressure for up to 12 weeks after pregnancy. It can also raise your lifetime risk of chronic high blood pressure, heart disease and blood vessel disease. It is vital that you keep monitoring your blood pressure and taking steps to control it.   The general guidelines below are what your blood pressures mean.  A heart healthy lifestyle that includes blood pressure control can help reduce these risks. A good blood pressure goal is less than 130/80 mmHg long-term.  Talk to your provider about high blood pressure disorder of pregnancy and what this means for your lifelong health.   Know your numbers  Read \"Checking Your Blood Pressure at Home\" to learn the best way to take your blood pressure.  Refer to the next page for general guidelines about what your blood pressures mean and what to do about them. Follow these instructions unless your provider tells you something different.  For more resources, visit www.preeclampsia.org.  What to do if your blood pressure is high  Act right away if you have numbers in the yellow or red range--don't wait for a scheduled appointment.  When to call your provider  Regardless of your blood pressure, call your healthcare provider right away if you develop any of these symptoms:  Severe headache  Chest pain  Trouble breathing  Stomach pain  Changes in vision  Swelling in your hands and face.  Please say, \"I am having symptoms of high blood pressure. My provider told me to call and ask to be seen right away when I have these symptoms.\"  If you ARE pregnant OR   it has been less than 12 weeks since you delivered  Systolic pressure (top number) is....  Diastolic (bottom number) is.... Your blood pressure is....   160 or higher  or 110 or higher " VERY HIGH. Check it again in 10 minutes, then contact your provider.   140 - 159  or 90 - 109 HIGH. Keep checking blood pressure 2 times a day. If your blood pressure is in this range for 2 readings, contact your provider within 24 hours. We will discuss starting or increasing your blood pressure medicine.   100 - 139  and 60 - 89 NORMAL. Your blood pressure looks great!   Keep checking it 2 times a day.   Less than 100  or Less than 60 LOW. Check your blood pressure again in   10 minutes, then contact your provider. We may need to make changes to your blood pressure medicine.     Call your provider right away if you have these symptoms: a severe headache, vision changes, shortness of breath, chest pain, or right upper belly pain. Call even if your blood pressure is okay.  Call 9-1-1 if you feel the symptoms are severe and that it is an emergency.   If you are NOT pregnant OR   it has been more than 12 weeks since you delivered  Systolic pressure (top number) is....  Diastolic (bottom number) is.... Your blood pressure is....   Higher than 180 and/or Higher than 120 Hypertensive crisis. Call your doctor right away.   140 or higher or  90 or higher Hypertension Stage 2. Follow up with your provider.   130-139 or Less than 80 Hypertension Stage 1. Follow up with your provider.   120-129 and Less than 80 Elevated blood pressure (pre-hypertension). You are at higher risk of developing high blood pressure. Follow up with your provider.   Less than 120 and Less than 80 Normal.     Call your provider right away if you have these symptoms: a severe headache, vision changes, shortness of breath, chest pain, or right upper belly pain. Call even if your blood pressure is okay.  Call 9-1-1 if you feel the symptoms are severe and that it is an emergency.   For informational purposes only. Not to replace the advice of your health care provider. Copyright   2023 [facility]. All rights reserved. Clinically reviewed by  [facility]. CYPHER [id number] - 03/23.      Checking Your Blood Pressure at Home  During and after pregnancy  How do I measure my blood pressure?  It s important to take the readings at the same time each day, such as morning and evening. Take your blood pressure before taking any morning medications.  How to get the most accurate reading  30 minutes before checking your blood pressure, avoid the following:  Drinking caffeine  Drinking alcohol  Eating  Smoking  Exercising  5 minutes before checking your blood pressure:  Use the bathroom and urinate so you have an empty bladder.  Sit still in a chair for around 5 minutes. Stay calm and relaxed and do not talk if possible.  To check your blood pressure:     Sit up straight in a chair.  Place your feet on the floor. Don t cross your ankles or legs.  Rest your arm at the level of your heart on a table or desk or on the arm of a chair. Use the same arm every day.  Pull up your shirt sleeve. Don t take the measurement over clothes.  Wrap the blood pressure cuff around the upper part of your left arm, 1 inch (2.5 cm) above your elbow.  Fit the cuff snugly around your arm. You should be able to place only one finger between the cuff and your arm.  Position the cord so that it rests in the bend of your elbow.  Press the power button.  Sit quietly while the cuff inflates and deflates.  Read the digital reading on the monitor screen and write the numbers down (record them) in a notebook.  Wait 2-3 minutes, then repeat the steps, starting at step 1.  Which features do you need?  Arm cuff monitors give the most exact readings.  Wrist and finger blood pressure monitors are often less exact.  Pick a blood pressure monitor that has passed tests to show they measure exactly. Blood pressure cuffs for sale in the [place] that have passed tests are listed on the website www.validatebp.org.  Some monitors that have passed tests are:  Omron 3 Series Upper Arm Blood  "Pressure Monitor (Model MJ3174)  Omron 5 Series Upper Arm Blood Pressure Monitor (Model CR3324)  Omron 7 Series Upper Arm Blood Pressure Monitor (Model HEM-7320)  [employer] Medical Upper Arm Blood Pressure Monitor with Talking Function (UA 1030T)  Don t use smartphone apps. There are many smartphone apps that claim to check your blood pressure using the pulse in your wrist or finger. These don t work. They haven t passed any tests. Don t give your clinic a blood pressure reading from a smartphone xiomara.  If you have a flexible spending account (FSA) or health savings account (HSA), you may wish to pay yourself back (reimburse) for the machine and cuff. A blood pressure monitor is an allowed over-the- counter (OTC) item to pay yourself back from these accounts.  Cuff size  The size of the arm cuff is a key feature. Make sure the cuff is the right size for your arm. If the cuff isn t the right size, readings will either be too high or low.  To know what size cuff to buy, measure the distance around your bicep (upper arm).  Use a flexible measuring tape or . Place the measuring tape CHCF between your armpit and elbow. Measure the distance around your arm in inches.  You may need to buy a cuff apart from the machine to get the right size.  Cuff sizes and arm measurements  Small adult: 22 to 26 cm (8.7 to 10.2 inches)  Adult: 27 to 34 cm (10.6 to 13.4 inches)  Large adult: 35 to 44 cm (13.8 to 17.3 inches)  Adult thigh: 45 to 52 cm (17.7 to 20.5 inches)    Copyright statement\" content=\"For informational purposes only. Not to replace the advice of your health care provider. Photo: ID [id number]   Trendslide.com. Text copyright   2023 [facility]. All rights reserved. Clinically reviewed by [facility]. Simplesurance [id number] - REV 03/23.          Future Appts:    Date: Monday, 11/27/2023  Time: 12:00 pm - 1:00 pm  Provider: Danita GRANDA PA-C  Location: [facility], [address] [place] [address], [address], [place] [address]  Phone: [phone]  Type: Telepsychiatry    Patient Instructions  Please fill New Patient Form by using following link. All forms need to be completed 24 hours prior to the appointment date/time by going to [email or web address] Please call us on [phone] 24 hours prior to your scheduled appointment to confirm that you are able to attend. We will provide you information about how to log into video call software when you call.    Date: Wednesday, 11/29/2023  Time: 9:00 am - 10:00 am  Provider: Nehal Darden MS, LP  Location: [facility], [address] [place] [address], [address], [place] [address]  Phone: [phone]  Type: TeletherapyWarning Signs after Having a Baby    Keep this paper on your fridge or somewhere else where you can see it.    Call your provider if you have any of these symptoms up to 12 weeks after having your baby.    Thoughts of hurting yourself or your baby  Pain in your chest or trouble breathing  Severe headache not helped by pain medicine  Eyesight concerns (blurry vision, seeing spots or flashes of light, other changes to eyesight)  Fainting, shaking or other signs of a seizure    Call 9-1-1 if you feel that it is an emergency.     The symptoms below can happen to anyone after giving birth. They can be very serious. Call your provider if you have any of these warning signs.    My provider s phone number: _______________________    Losing too much blood (hemorrhage)    Call your provider if you soak through a pad in less than an hour or pass blood clots bigger than a golf ball. These may be signs that you are bleeding too much.    Blood clots in the legs or lungs    After you give birth, your body naturally clots its blood to help prevent blood loss. Sometimes this increased clotting can happen in other areas of the body, like the legs or lungs. This can block your blood flow and  be very dangerous.     Call your provider if you:  Have a red, swollen spot on the back of your leg that is warm or painful when you touch it.   Are coughing up blood.     Infection    Call your provider if you have any of these symptoms:  Fever of 100.4 F (38 C) or higher.  Pain or redness around your stitches if you had an incision.   Any yellow, white, or green fluid coming from places where you had stitches or surgery.    Mood Problems (postpartum depression)    Many people feel sad or have mood changes after having a baby. But for some people, these mood swings are worse.     Call your provider right away if you feel so anxious or nervous that you can't care for yourself or your baby.    Preeclampsia (high blood pressure)    Even if you didn't have high blood pressure when you were pregnant, you are at risk for the high blood pressure disease called preeclampsia. This risk can last up to 12 weeks after giving birth.     Call your provider if you have:   Pain on your right side under your rib cage  Sudden swelling in the hands and face    Remember: You know your body. If something doesn't feel right, get medical help.     For informational purposes only. Not to replace the advice of your health care provider. Copyright 2020 [facility]. All rights reserved. Clinically reviewed by Victoria Hartmann, RNC-OB, MSN. [employer]pper [id number] - Rev 02/23.
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
## 2023-11-23 VITALS
WEIGHT: 242.5 LBS | BODY MASS INDEX: 33.95 KG/M2 | SYSTOLIC BLOOD PRESSURE: 146 MMHG | HEART RATE: 89 BPM | OXYGEN SATURATION: 96 % | HEIGHT: 71 IN | DIASTOLIC BLOOD PRESSURE: 88 MMHG | TEMPERATURE: 98.2 F | RESPIRATION RATE: 18 BRPM

## 2023-11-23 PROCEDURE — 250N000013 HC RX MED GY IP 250 OP 250 PS 637: Performed by: OBSTETRICS & GYNECOLOGY

## 2023-11-23 PROCEDURE — 250N000013 HC RX MED GY IP 250 OP 250 PS 637: Performed by: REGISTERED NURSE

## 2023-11-23 RX ADMIN — NIFEDIPINE 60 MG: 30 TABLET, EXTENDED RELEASE ORAL at 08:27

## 2023-11-23 RX ADMIN — OXYCODONE HYDROCHLORIDE 5 MG: 5 TABLET ORAL at 02:20

## 2023-11-23 RX ADMIN — IBUPROFEN 800 MG: 800 TABLET ORAL at 08:29

## 2023-11-23 RX ADMIN — IBUPROFEN 800 MG: 800 TABLET ORAL at 02:18

## 2023-11-23 RX ADMIN — METHYLPHENIDATE HYDROCHLORIDE 18 MG: 18 TABLET, EXTENDED RELEASE ORAL at 09:47

## 2023-11-23 RX ADMIN — DOCUSATE SODIUM 100 MG: 100 CAPSULE, LIQUID FILLED ORAL at 08:24

## 2023-11-23 RX ADMIN — SERTRALINE HYDROCHLORIDE 50 MG: 25 TABLET ORAL at 08:26

## 2023-11-23 ASSESSMENT — ACTIVITIES OF DAILY LIVING (ADL)
ADLS_ACUITY_SCORE: 20

## 2023-11-23 NOTE — PROGRESS NOTES
.vu  OB Post Partum Note     ASSESSMENT: PLAN:      PPD#3   spontaneous vaginal delivery  Gestational hyeprtension- started meds yesterday- still with elevated pressures- will continue to monitor- medication increased today.      Doing well  Routine cares  Anticipate discharge to home in am     SUBJECTIVE:   no complaints, denies fever, chills.  Tolerating po, ambulating.  Baby doing well     OBJECTIVE:  Vitals:    11/22/23 0817 11/22/23 0933 11/22/23 1129 11/22/23 1551   BP: (!) 148/93 133/80 120/75 118/76   BP Location: Left arm Right arm Left arm Left arm   Patient Position:       Cuff Size:       Pulse: 101 111 117 103   Resp: 18   18   Temp: 98.1  F (36.7  C)   98.4  F (36.9  C)   TempSrc: Oral   Oral   SpO2: 98%   98%   Weight:       Height:                       abd- fundus firm  Ext- no tenderness,   cv- regular rate  Lungs- clear     Libby Blue MD  [facility]  [phone]

## 2023-11-23 NOTE — PLAN OF CARE
Problem: Postpartum (Vaginal Delivery)  Goal: Hemostasis  Outcome: Progressing     Problem: Postpartum (Vaginal Delivery)  Goal: Optimal Pain Control and Function  Outcome: Progressing  Intervention: Prevent or Manage Pain  Recent Flowsheet Documentation  Taken 11/23/2023 0000 by Renu Gaines, RN  Pain Management Interventions: rest  Taken 11/22/2023 2015 by Renu Gaines, RN  Pain Management Interventions: medication (see MAR)    Goal Outcome Evaluation:    Patient's blood pressure has been in the 140s/80s throughout the night.   Bleeding is stable.   Denies of any vision changes, or pain in upper right abdomen. Patient has had headaches since 1300 yesterday. Headache pain slightly improved with PRN oxycodone. No new changes on severity of headache symptoms and pain throughout the shift.  Ambulating in room independently.   Voiding without any diffiulties.   Pain is tolerable with PRN tylenol, oxycodone, and ibuprofen.   Normal postpartum cares.

## 2023-11-23 NOTE — PROVIDER NOTIFICATION
11/22/23 2116   Provider Notification   Provider Name/Title Dr. Jacobson   Method of Notification Phone   Request Evaluate-Remote   Notification Reason Medication Request     Patient requesting for PRN pain medication as tylenol and ibuprofen is not helping with her headache and its a constant annoyance.     Verb orders from MD: Ok to start oxycodone 5 mg every 4 hours as needed for pain.

## 2023-11-23 NOTE — DISCHARGE SUMMARY
HOSPITAL DISCHARGE SUMMARY - SURGERY    NAME: Amada Melo  : 1990   MRN: [id number]    PCP: Gretchen Oorzco    ADMISSION DATE: 2023    DISCHARGE DATE: 2023     PRINCIPAL DISCHARGE DIAGNOSIS:   S/P      PROCEDURES PERFORMED DURING HOSPITALIZATION:      CONSULTS:  Psych consultation    BRIEF HISTORY OF PRESENT ILLNESS AND HOSPITAL COURSE:  This is a  32 year old female admitted for labor and delivery. Patient underwent   .  Patient tolerated the procedure well. Post operative course has been remarkable for psych issues and was seen here by NP. On day of discharge, her pain is controlled with current oral medications and is tolerating diet. She is voiding appropriately and is passing gas.     LABS:  Lab Results   Component Value Date    HGB 10.1 (L) 2023       DISPOSITION:  home    DISCHARGE CONDITION: Good/Stable    DISCHARGE MEDICATIONS:   Nifedipine 60 bid, Zoloft, Concerta, Oxycodone 5mg #6 tablets    DISCHARGE PLAN:   - Follow up with Ernesto in 6wks   - Take medication as prescribed  - Physical activity: As tolerated, no heavy lifting. Pelvic rest.  - Diet:  Regular  - Medication:  Please see MAR  - Warning signs discussed with patient about when to call the clinic/hospital  - All questions and concerns were answered for the patient prior to discharge.       Amalia Balderrama DO, FACOG  2023 8:53 AM        I saw the patient on the date of discharge              ?

## 2023-11-23 NOTE — PLAN OF CARE
Problem: Adult Inpatient Plan of Care  Goal: Optimal Comfort and Wellbeing  Intervention: Monitor Pain and Promote Comfort  Recent Flowsheet Documentation  Taken 11/23/2023 0830 by Dodie Rahman RN  Pain Management Interventions: medication (see MAR)   Goal Outcome Evaluation:       Pt states pain is improving, headache comes & goes. BP staying the same, 140/88. Dr. Balderrama at bedside aware. Pt being discharged on new BP meds. Pt states she has a BP cuff at home & is aware of how to use.       Problem: Postpartum (Vaginal Delivery)  Goal: Successful Parent Role Transition  Outcome: Progressing  Intervention: Support Parent Role Transition  Recent Flowsheet Documentation  Taken 11/23/2023 0830 by Dodie Rahman, RN  Supportive Measures: active listening utilized     Bonding well with infant, breastfeeding every 2-3 hrs. Requesting a breast pump at discharge. Home today.    Following up with Psych after discharge, pt agrees to plan on meds etc. RX sent. SW following.    Dodie Rahman, SIVAN

## 2023-11-23 NOTE — PROGRESS NOTES
All Discharge education completed including review of warning signs. Pt verbalized understanding & deny having additional questions. Follow-up is planned for 2 & 6 weeks.    BP Cuff & Breast pump sold to pt.    Dodie Rahman RN

## 2023-11-23 NOTE — PROVIDER NOTIFICATION
11/22/23 2035   Provider Notification   Provider Name/Title Dr. Jacobson   Method of Notification Phone   Request Evaluate-Remote   Notification Reason Other  (New onset of Headache)     This RN called to update MD that patient stated new onset of headache that started around 1300. Patient's Nifedipine was increased from 30 mg to 60 mg. Patient was also started on Concerta and Zoloft at 1130. Patient states headache is frontal and behind eyes, sharp and throbbing and annoying. Headaches does not go away with PRN pain medications and has not changed in severity since 1300. Continue to monitor.

## 2023-11-23 NOTE — PROGRESS NOTES
In House OB    Called overnight with elevated blood pressures and headache. No blood pressures in the severe range. On Nifedipine 60mg daily. Patient given routine medications including tylenol, IBU, and oxycodone. Will continue to monitor with no new orders at this time.     Catalino Jacobson M.D.

## 2023-11-24 LAB
FENTANYL UR-MCNC: NORMAL NG/ML
NORFENTANYL UR-MCNC: NORMAL NG/ML

## 2023-11-27 ENCOUNTER — OFFICE VISIT (OUTPATIENT)
Dept: FAMILY MEDICINE | Facility: CLINIC | Age: 33
End: 2023-11-27
Payer: COMMERCIAL

## 2023-11-27 VITALS
BODY MASS INDEX: 33.65 KG/M2 | RESPIRATION RATE: 20 BRPM | TEMPERATURE: 98.2 F | WEIGHT: 240.4 LBS | OXYGEN SATURATION: 99 % | DIASTOLIC BLOOD PRESSURE: 74 MMHG | HEIGHT: 71 IN | HEART RATE: 97 BPM | SYSTOLIC BLOOD PRESSURE: 138 MMHG

## 2023-11-27 DIAGNOSIS — F41.9 ANXIETY: ICD-10-CM

## 2023-11-27 DIAGNOSIS — F43.10 PTSD (POST-TRAUMATIC STRESS DISORDER): Primary | ICD-10-CM

## 2023-11-27 LAB
PATH REPORT.COMMENTS IMP SPEC: NORMAL
PATH REPORT.FINAL DX SPEC: NORMAL
PATH REPORT.GROSS SPEC: NORMAL
PATH REPORT.MICROSCOPIC SPEC OTHER STN: NORMAL
PATH REPORT.RELEVANT HX SPEC: NORMAL
PHOTO IMAGE: NORMAL

## 2023-11-27 PROCEDURE — 99203 OFFICE O/P NEW LOW 30 MIN: CPT | Performed by: FAMILY MEDICINE

## 2023-11-27 RX ORDER — METHYLPHENIDATE HYDROCHLORIDE 18 MG/1
TABLET ORAL
COMMUNITY

## 2023-11-27 RX ORDER — GABAPENTIN 100 MG/1
300 CAPSULE ORAL 3 TIMES DAILY PRN
Qty: 90 CAPSULE | Refills: 0 | Status: SHIPPED | OUTPATIENT
Start: 2023-11-27

## 2023-11-27 NOTE — COMMUNITY RESOURCES LIST (ENGLISH)
11/27/2023   [facility]  N/A  For questions about this resource list or additional care needs, please contact your primary care clinic or care manager.  Phone: [phone]   Email: N/A   Address: [address] [place] [address]   Hours: N/A        Food and Nutrition       Food pantry  1  [facility] - Food Distribution Program Distance: 0.22 miles      In-Person   [address] [place] [address]  Language: English, Hmong, Macedonian  Hours: Tue 3:00 PM - 5:00 PM  Fees: Free   Phone: (252) 307-4518 Email: info@[facility] HospitalGood PhotoChrist[facility].Recommendi Website: http://[facility].org/programs/ghivqp-pnzsaipej-xkldkl/     2  [facility] Distance: 2.61 miles      Pickup   [address] [place] [address]  Language: English  Hours: Mon 10:00 AM - 11:30 AM , Thu 10:00 AM - 11:30 AM  Fees: Free   Phone: (923) 268-8463 Email: info@[facility].org Website: https://[facility].org/find-support/partner-organizations/housing-assistance/     SNAP application assistance  3  [facility] - SNAP Outreach Distance: 3.8 miles      Phone/Virtual   [address] [place] [address]  Language: Tajik, English, Hmong, Pilar, Macedonian  Hours: Mon - Fri 10:00 AM - 12:00 PM , Mon - Fri 2:00 PM - 4:00 PM  Fees: Free   Phone: (250) 584-4973 Website: https://[facility].org/     4  [facility] of Human Services - MNFoodHelper (SNAP) Distance: 3.99 miles      Phone/Virtual   [address] [address] [place] [address]  Language: English, Hmong, Mauritanian, Moroccan, Macedonian, Greek  Hours: Mon - Fri 9:00 AM - 5:00 PM  Fees: Free   Phone: (679) 320-9714 Website: https://[place].gov/dhs/people-we-serve/adults/economic-assistance/food-nutrition/programs-and-services/supplemental-nutrition-assistance-program.jsp     Soup kitchen or free meals  5  [facility] - Loaves and Fishes - Loaves and Fishes Distance: 2.9  miles      Pickup   [address] [place] [address]  Language: English  Hours: Wed 5:30 PM - 6:30 PM  Fees: Free   Phone: (500) 113-4563 Email: office@orMoberly Regional Medical Center.org Website: https://www.FID3[facility].TheTake     6  [facility] - [facility] Office - [place] and [facility] Distance: 4.23 miles      Pickup   [address] [place] [address]  Language: English  Hours: Mon - Fri 5:00 PM - 6:00 PM  Fees: Free   Phone: (840) 289-6658 Email: pgrant@[employer] Website: http://www.LVenture GroupPeconic Bay Medical CenterPrifloat.org/          Important Numbers & Websites       Emergency Services   911  [facility] Services   311  Poison Control   (378) 138-3687  Suicide Prevention Lifeline   (919) 723-4788 (TALK)  Child Abuse Hotline   (269) 881-8051 (4-A-Child)  Sexual Assault Hotline   (446) 129-4214 (HOPE)  National Runaway Safeline   (216) 688-1234 (RUNAWAY)  All-Options Talkline   (249) 207-9707  Substance Abuse Referral   (967) 481-8041 (HELP)

## 2023-11-27 NOTE — PROGRESS NOTES
"  Assessment & Plan   Problem List Items Addressed This Visit       PTSD (post-traumatic stress disorder) - Primary    Relevant Medications    gabapentin (NEURONTIN) 100 MG capsule    Anxiety    Relevant Medications    gabapentin (NEURONTIN) 100 MG capsule    Continue current management on sertraline 50 mg and gabapentin as prescribed             Nicotine/Tobacco Cessation:  She reports that she has been smoking cigarettes. She has been smoking an average of .5 packs per day. She has never used smokeless tobacco.  Nicotine/Tobacco Cessation Plan:   Self help information given to patient      BMI:   Estimated body mass index is 33.53 kg/m  as calculated from the following:    Height as of this encounter: 1.803 m (5' 11\").    Weight as of this encounter: 109 kg (240 lb 6.4 oz).           Chelsey Candelario MD  [facility]   Rolanda is a 32 year old, presenting for the med check and follow up of Mental health conditions. She notes that the current therapy is going well, and the meds are well tolerated with no adverse effects, requesting a refill on the gabapentin.         11/27/2023     5:10 PM   Additional Questions   Roomed by Zenaida KEMP       Medication Followup of all medications  Taking Medication as prescribed: yes  Side Effects:  None  Medication Helping Symptoms:  yes        Review of Systems         Objective    /74 (BP Location: Right arm, Patient Position: Sitting, Cuff Size: Adult Large)   Pulse 97   Temp 98.2  F (36.8  C) (Oral)   Resp 20   Ht 1.803 m (5' 11\")   Wt 109 kg (240 lb 6.4 oz)   SpO2 99%   Breastfeeding Yes   BMI 33.53 kg/m    Body mass index is 33.53 kg/m .    Physical Exam   GENERAL: healthy, alert and no distress  PSYCH: mentation appears normal, affect normal/bright                      "

## 2023-11-27 NOTE — COMMUNITY RESOURCES LIST (ENGLISH)
11/27/2023   [facility]  N/A  For additional resource needs, please contact your health insurance member services or your primary care team.  Phone: [phone]   Email: N/A   Address: [address] [place] [address]   Hours: N/A        Food and Nutrition       Food pantry  1  [facility] - Food Distribution Program Distance: 0.22 miles      In-Person   [address] [place] [address]  Language: English, Hmong, Cymraes  Hours: Tue 3:00 PM - 5:00 PM  Fees: Free   Phone: (596) 693-5290 Email: info@[facility] TownshipFancy HandsSaint Francis Healthcare.MyActivityPal Website: http://[facility].org/programs/hfetya-qtgfarmag-anacjh/     2  [facility] Distance: 2.61 miles      Pickup   [address] [place] [address]  Language: English  Hours: Mon 10:00 AM - 11:30 AM , Thu 10:00 AM - 11:30 AM  Fees: Free   Phone: (794) 219-2071 Email: info@[employer].MyActivityPal Website: https://[facility].MyActivityPal/find-support/partner-organizations/housing-assistance/     SNAP application assistance  3  [employer] [place] Distance: 4.68 miles      Phone/Virtual   [address] [address]  Language: English, Hmong, Bangladeshi, Sammarinese, Cymraes  Hours: Mon - Fri 8:30 AM - 4:30 PM  Fees: Free   Phone: (535) 360-2121 Email: helpline@hungersolutions.org Website: https://www.hungersolutions.org/programs/[place]-food-helpline/     4  [facility] - SNAP Outreach Distance: 3.8 miles      Phone/Virtual   [address] [place] [address]  Language: Turkish, English, Hmong, Pilar, Cymraes  Hours: Mon - Fri 10:00 AM - 12:00 PM , Mon - Fri 2:00 PM - 4:00 PM  Fees: Free   Phone: (584) 516-6309 Website: https://neighborhoodEncompass Health Rehabilitation Hospital of Harmarville.org/     Soup kitchen or free meals  5  [facility] - Loaves and Fishes - Loaves and Fishes Distance: 2.9 miles      Pickup   [address] [place] [address]  Language: English  Hours: Wed  5:30 PM - 6:30 PM  Fees: Free   Phone: (442) 664-2983 Email: office@orFre[facility].org Website: https://www.susanaC[facility] Clinic [facility].org     6  [facility] Office - [place] and [place] Distance: 4.23 miles      [address] [address]  Language: English  Hours: Mon - Fri 5:00 PM - 6:00 PM  Fees: Free   Phone: (100) 518-2488 Email: pgrant@[facility].org Website: http://www.RelifySt. Francis Hospital & [facility] Center.org/          Important Numbers & Websites       [facility]   211 [facility] Hospitalitedway.org  Poison Control   (103) 118-2456 [place]po[place]  Suicide and Crisis Lifeline   [address] [place], [place] [address]line.org  Childhelp [place] Child Abuse Hotline   684.196.4305 Childhelphotline.org  [place] Sexual Assault Hotline   (130) 567-9198 (HOPE) [facility].org  [place] Runaway Safeline   (798) 312-6365 (RUNAWAY) [facility] and ClinicsrunaSaint Thomas [facility].org  Pregnancy & Postpartum Support [place]   Call/text 261-951-4408 Ppsupportmn.org  Substance Abuse National Helpline ([facility]   709-924-HELP (1648) Findtreatment.gov  Emergency Services   911

## 2023-11-27 NOTE — COMMUNITY RESOURCES LIST (ENGLISH)
11/27/2023   [facility]  N/A  For questions about this resource list or additional care needs, please contact your primary care clinic or care manager.  Phone: [phone]   Email: N/A   Address: [address] [place] [address]   Hours: N/A        Food and Nutrition       Food pantry  1  [facility] - Food Distribution Program Distance: 0.22 miles      In-Person   [address] [place] [address]  Language: English, Hmong, Guyanese  Hours: Tue 3:00 PM - 5:00 PM  Fees: Free   Phone: (289) 835-2636 Email: info@[facility] CenterAdpepsBayhealth Medical Center.[employer] Website: http://[facility].org/programs/xymtvq-nrtrqvszt-juiovu/     2  [facility] Distance: 2.61 miles      Pickup   [address] [place] [address]  Language: English  Hours: Mon 10:00 AM - 11:30 AM , Thu 10:00 AM - 11:30 AM  Fees: Free   Phone: (947) 576-2897 Email: info@[facility].org Website: https://[facility].org/find-support/partner-organizations/housing-assistance/     SNAP application assistance  3  [facility] - SNAP Outreach Distance: 3.8 miles      Phone/Virtual   [address] [place] [address]  Language: Malay, English, Hmong, Pilar, Guyanese  Hours: Mon - Fri 10:00 AM - 12:00 PM , Mon - Fri 2:00 PM - 4:00 PM  Fees: Free   Phone: (385) 156-8335 Website: https://[facility].org/     4  [facility] of Human Services - MNFoodHelper (SNAP) Distance: 3.99 miles      Phone/Virtual   [address] [address] [place] [address]  Language: English, Hmong, Armenian, Chinese, Guyanese, Sinhala  Hours: Mon - Fri 9:00 AM - 5:00 PM  Fees: Free   Phone: (671) 872-1893 Website: https://[place].gov/dhs/people-we-serve/adults/economic-assistance/food-nutrition/programs-and-services/supplemental-nutrition-assistance-program.jsp     Soup kitchen or free meals  5  Our [facility] - Loaves and Fishes - Loaves and Fishes Distance: 2.9  miles      Pickup   [address] [place] [address]  Language: English  Hours: Wed 5:30 PM - 6:30 PM  Fees: Free   Phone: (476) 926-2953 Email: office@orColumb[facility] Regional Hospital.org Website: https://www.GetThisCape [facility]al Hospital.[employer]     6  St. Eng [facility] Office - [place] and [facility] Distance: 4.23 miles      Pickup   [address] [place] [address]  Language: English  Hours: Mon - Fri 5:00 PM - 6:00 PM  Fees: Free   Phone: (741) 203-5136 Email: pgrant@[employer] Website: http://www.AIRSISWeill Cornell Medical CenterMamaherb.org/          Important Numbers & Websites       Emergency Services   911  [facility]   311  Poison Control   (618) 613-5317  Suicide Prevention Lifeline   (197) 290-7932 (TALK)  Child Abuse Hotline   (864) 837-7201 (4-A-Child)  Sexual Assault Hotline   (133) 550-9207 (HOPE)  National Runaway Safeline   (753) 350-4000 (RUNAWAY)  All-Options Talkline   (285) 795-4979  Substance Abuse Referral   (546) 853-6229 (HELP)

## 2023-11-27 NOTE — COMMUNITY RESOURCES LIST (ENGLISH)
11/27/2023   [facility]  N/A  For questions about this resource list or additional care needs, please contact your primary care clinic or care manager.  Phone: [phone]   Email: N/A   Address: [address] [place] [address]   Hours: N/A        Food and Nutrition       Food pantry  1  [facility] - Food Distribution Program Distance: 0.22 miles      In-Person   [address] [place] [address]  Language: English, Hmong, Emirati  Hours: Tue 3:00 PM - 5:00 PM  Fees: Free   Phone: (811) 102-7175 Email: info@[facility] CenterWaveCheckTidalHealth [facility].dloHaiti Website: http://[facility].org/programs/nadfdi-bpbdvjzio-ykzzdh/     2  [facility] Distance: 2.61 miles      Pickup   [address] [place] [address]  Language: English  Hours: Mon 10:00 AM - 11:30 AM , Thu 10:00 AM - 11:30 AM  Fees: Free   Phone: (548) 820-8787 Email: info@[facility].org Website: https://[facility].org/find-support/partner-organizations/housing-assistance/     SNAP application assistance  3  [facility] - SNAP Outreach Distance: 3.8 miles      Phone/Virtual   [address] [place] [address]  Language: Wolof, English, Hmong, Pilar, Emirati  Hours: Mon - Fri 10:00 AM - 12:00 PM , Mon - Fri 2:00 PM - 4:00 PM  Fees: Free   Phone: (386) 449-3270 Website: https://[facility].org/     4  [facility] of Human Services - MNFoodHelper (SNAP) Distance: 3.99 miles      Phone/Virtual   [address] [address] [place] [address]  Language: English, Hmong, Central African, Surinamese, Emirati, Divehi  Hours: Mon - Fri 9:00 AM - 5:00 PM  Fees: Free   Phone: (272) 915-2684 Website: https://[place].gov/dhs/people-we-serve/adults/economic-assistance/food-nutrition/programs-and-services/supplemental-nutrition-assistance-program.jsp     Soup kitchen or free meals  5  [facility] - Loaves and Fishes - Loaves and Fishes Distance: 2.9  miles      Pickup   [address] [place] [address]  Language: English  Hours: Wed 5:30 PM - 6:30 PM  Fees: Free   Phone: (284) 656-3485 Email: office@orTwo [facility] Psychiatric Hospital.org Website: https://www.Affordit.comGulf [facility]reeze Hospital.Akosha     6  St. Eng [facility] Office - [place] and [facility] Distance: 4.23 miles      Pickup   [address] [place] [address]  Language: English  Hours: Mon - Fri 5:00 PM - 6:00 PM  Fees: Free   Phone: (355) 386-4972 Email: pgrant@[employer] Website: http://www.CloudnexaMontefiore Nyack HospitalUpstream Commerce.org/          Important Numbers & Websites       Emergency Services   911  [facility] Services   311  Poison Control   (353) 876-4921  Suicide Prevention Lifeline   (791) 564-3410 (TALK)  Child Abuse Hotline   (882) 333-9373 (4-A-Child)  Sexual Assault Hotline   (303) 553-7173 (HOPE)  National Runaway Safeline   (976) 106-1239 (RUNAWAY)  All-Options Talkline   (817) 817-9709  Substance Abuse Referral   (540) 268-4200 (HELP)

## 2023-11-27 NOTE — COMMUNITY RESOURCES LIST (ENGLISH)
11/27/2023   [facility]  N/A  For questions about this resource list or additional care needs, please contact your primary care clinic or care manager.  Phone: [phone]   Email: N/A   Address: [address] [place] [address]   Hours: N/A        Food and Nutrition       Food pantry  1  [facility] - Food Distribution Program Distance: 0.22 miles      In-Person   [address] [place] [address]  Language: English, Hmong, Guatemalan  Hours: Tue 3:00 PM - 5:00 PM  Fees: Free   Phone: (684) 742-6948 Email: info@[facility] HospitalSmart Medical SystemsSouth Coastal [facility].[employer] Website: http://[facility].org/programs/aspkvh-agnavvcvl-qkcfmm/     2  [facility] Distance: 2.61 miles      Pickup   [address] [place] [address]  Language: English  Hours: Mon 10:00 AM - 11:30 AM , Thu 10:00 AM - 11:30 AM  Fees: Free   Phone: (635) 506-5028 Email: info@[facility].org Website: https://[facility].org/find-support/partner-organizations/housing-assistance/     SNAP application assistance  3  [facility] - SNAP Outreach Distance: 3.8 miles      Phone/Virtual   [address] [place] [address]  Language: Yi, English, Hmong, Pilar, Guatemalan  Hours: Mon - Fri 10:00 AM - 12:00 PM , Mon - Fri 2:00 PM - 4:00 PM  Fees: Free   Phone: (580) 303-1757 Website: https://[facility].org/     4  [facility] of Human Services - MNFoodHelper (SNAP) Distance: 3.99 miles      Phone/Virtual   [address] [address] [place] [address]  Language: English, Hmong, Ivorian, Bruneian, Guatemalan, Tamazight  Hours: Mon - Fri 9:00 AM - 5:00 PM  Fees: Free   Phone: (931) 834-3213 Website: https://[place].gov/dhs/people-we-serve/adults/economic-assistance/food-nutrition/programs-and-services/supplemental-nutrition-assistance-program.jsp     Soup kitchen or free meals  5  Our Get[facility]w [facility] - Loaves and Fishes - Loaves and Fishes Distance: 2.9  miles      Pickup   [address] [place] [address]  Language: English  Hours: Wed 5:30 PM - 6:30 PM  Fees: Free   Phone: (722) 320-2144 Email: office@orGold[facility].org Website: https://www.VapothermColumb[facility].Only-apartments     6  St. Eng [facility] Office - [place] and [facility] Distance: 4.23 miles      Pickup   [address] [place] [address]  Language: English  Hours: Mon - Fri 5:00 PM - 6:00 PM  Fees: Free   Phone: (942) 517-3454 Email: pgrant@[employer] Website: http://www.Six Trees CapitalU.S. Army General Hospital No. 1GIDEEN.org/          Important Numbers & Websites       Emergency Services   911  [facility] Services   311  Poison Control   (291) 870-1731  Suicide Prevention Lifeline   (230) 692-5367 (TALK)  Child Abuse Hotline   (201) 988-8005 (4-A-Child)  Sexual Assault Hotline   (540) 412-1191 (HOPE)  National Runaway Safeline   (666) 658-7309 (RUNAWAY)  All-Options Talkline   (935) 618-3204  Substance Abuse Referral   (976) 646-7303 (HELP)

## 2023-11-27 NOTE — COMMUNITY RESOURCES LIST (ENGLISH)
11/27/2023   [facility]  N/A  For questions about this resource list or additional care needs, please contact your primary care clinic or care manager.  Phone: [phone]   Email: N/A   Address: [address] [place] [id number]   Hours: N/A        Food and Nutrition       Food pantry  1  [facility] [place] - Food Distribution Program Distance: 0.22 miles      In-Person   [address] [place] [address]  Language: English, Hmong, Micronesian  Hours: Tue 3:00 PM - 5:00 PM  Fees: Free   Phone: (477) 583-4129 Email: info@[facility].QA on Request Website: http://[facility].org/programs/sesvse-kchedxqfe-eiumva/     2  [facility] Distance: 2.61 miles      Pickup   [address] [place] [address]  Language: English  Hours: Mon 10:00 AM - 11:30 AM , Thu 10:00 AM - 11:30 AM  Fees: Free   Phone: (851) 915-2309 Email: info@[facility].org Website: https://[facility].org/find-support/partner-organizations/housing-assistance/     SNAP application assistance  3  [facility] - SNAP Outreach Distance: 3.8 miles      Phone/Virtual   [address] [place] [address]  Language: Urdu, English, Hmong, Pilar, Micronesian  Hours: Mon - Fri 10:00 AM - 12:00 PM , Mon - Fri 2:00 PM - 4:00 PM  Fees: Free   Phone: (130) 278-7567 Website: https://[facility].org/     4  [facility] - [employer] (SNAP) Distance: 3.99 miles      Phone/Virtual   [address] [address] [place] [address]  Language: English, Hmong, Northern Irish, Malawian, Micronesian, Setswana  Hours: Mon - Fri 9:00 AM - 5:00 PM  Fees: Free   Phone: (287) 629-6241 Website: https://[place].gov/dhs/people-we-serve/adults/economic-assistance/food-nutrition/programs-and-services/supplemental-nutrition-assistance-program.jsp     Soup kitchen or free meals  5  [facility] - [employer] - Loaves and Fishes Distance: 2.9  miles      Pickup   [address] [place] [address]  Language: English  Hours: Wed 5:30 PM - 6:30 PM  Fees: Free   Phone: (281) 296-9427 Email: office@[facility].org Website: https://www.[facility] [facility].[employer]house     6  [facility] - [facility] Office - [place] and [facility] Distance: 4.23 miles      Pickup   [address] [place] [address]  Language: English  Hours: Mon - Fri 5:00 PM - 6:00 PM  Fees: Free   Phone: (252) 242-1292 Email: pgrant@[employer] Website: http://www.[facility].org/          Important Numbers & Websites       Emergency Services   911  [facility] Services   311  Poison Control   (357) 744-8010  Suicide Prevention Lifeline   (116) 637-5486 (TALK)  Child Abuse Hotline   (120) 781-6643 (4-A-Child)  Sexual Assault Hotline   (422) 824-3608 (HOPE)  National Runaway Safeline   (702) 715-1812 (RUNAWAY)  All-Options Talkline   (959) 525-4546  Substance Abuse Referral   (714) 901-5093 (HELP)

## 2023-12-01 ENCOUNTER — TRANSFERRED RECORDS (OUTPATIENT)
Dept: HEALTH INFORMATION MANAGEMENT | Facility: CLINIC | Age: 33
End: 2023-12-01
Payer: COMMERCIAL

## 2023-12-01 ENCOUNTER — MEDICAL CORRESPONDENCE (OUTPATIENT)
Dept: HEALTH INFORMATION MANAGEMENT | Facility: CLINIC | Age: 33
End: 2023-12-01
Payer: COMMERCIAL

## 2023-12-04 ENCOUNTER — TRANSCRIBE ORDERS (OUTPATIENT)
Dept: OTHER | Age: 33
End: 2023-12-04

## 2023-12-04 DIAGNOSIS — R51.9 HEAD ACHE: Primary | ICD-10-CM

## 2023-12-06 ENCOUNTER — HOSPITAL ENCOUNTER (EMERGENCY)
Facility: HOSPITAL | Age: 33
Discharge: HOME | End: 2023-12-06
Attending: EMERGENCY MEDICINE | Admitting: EMERGENCY MEDICINE
Payer: COMMERCIAL

## 2023-12-06 ENCOUNTER — DOCUMENTATION ONLY (OUTPATIENT)
Dept: PEDIATRICS | Facility: CLINIC | Age: 33
End: 2023-12-06
Payer: COMMERCIAL

## 2023-12-06 VITALS
WEIGHT: 243.83 LBS | SYSTOLIC BLOOD PRESSURE: 125 MMHG | HEIGHT: 71 IN | OXYGEN SATURATION: 99 % | TEMPERATURE: 98 F | HEART RATE: 90 BPM | BODY MASS INDEX: 34.14 KG/M2 | RESPIRATION RATE: 20 BRPM | DIASTOLIC BLOOD PRESSURE: 98 MMHG

## 2023-12-06 DIAGNOSIS — R07.89 ATYPICAL CHEST PAIN: ICD-10-CM

## 2023-12-06 LAB
ALBUMIN SERPL BCG-MCNC: 3.9 G/DL (ref 3.5–5.2)
ALBUMIN UR-MCNC: NEGATIVE MG/DL
ALP SERPL-CCNC: 91 U/L (ref 40–150)
ALT SERPL W P-5'-P-CCNC: 17 U/L (ref 0–50)
ANION GAP SERPL CALCULATED.3IONS-SCNC: 7 MMOL/L (ref 7–15)
APPEARANCE UR: CLEAR
AST SERPL W P-5'-P-CCNC: 14 U/L (ref 0–45)
BASOPHILS # BLD AUTO: 0 10E3/UL (ref 0–0.2)
BASOPHILS NFR BLD AUTO: 0 %
BILIRUB DIRECT SERPL-MCNC: <0.2 MG/DL (ref 0–0.3)
BILIRUB SERPL-MCNC: <0.2 MG/DL
BILIRUB UR QL STRIP: NEGATIVE
BUN SERPL-MCNC: 11.2 MG/DL (ref 6–20)
CALCIUM SERPL-MCNC: 9.2 MG/DL (ref 8.6–10)
CHLORIDE SERPL-SCNC: 104 MMOL/L (ref 98–107)
COLOR UR AUTO: ABNORMAL
CREAT SERPL-MCNC: 0.69 MG/DL (ref 0.51–0.95)
DEPRECATED HCO3 PLAS-SCNC: 28 MMOL/L (ref 22–29)
EGFRCR SERPLBLD CKD-EPI 2021: >90 ML/MIN/1.73M2
EOSINOPHIL # BLD AUTO: 0.2 10E3/UL (ref 0–0.7)
EOSINOPHIL NFR BLD AUTO: 3 %
ERYTHROCYTE [DISTWIDTH] IN BLOOD BY AUTOMATED COUNT: 12.2 % (ref 10–15)
GLUCOSE SERPL-MCNC: 89 MG/DL (ref 70–99)
GLUCOSE UR STRIP-MCNC: NEGATIVE MG/DL
HCT VFR BLD AUTO: 38.1 % (ref 35–47)
HGB BLD-MCNC: 12.5 G/DL (ref 11.7–15.7)
HGB UR QL STRIP: ABNORMAL
HOLD SPECIMEN: NORMAL
IMM GRANULOCYTES # BLD: 0 10E3/UL
IMM GRANULOCYTES NFR BLD: 0 %
KETONES UR STRIP-MCNC: NEGATIVE MG/DL
LEUKOCYTE ESTERASE UR QL STRIP: NEGATIVE
LYMPHOCYTES # BLD AUTO: 1.7 10E3/UL (ref 0.8–5.3)
LYMPHOCYTES NFR BLD AUTO: 32 %
MCH RBC QN AUTO: 30.6 PG (ref 26.5–33)
MCHC RBC AUTO-ENTMCNC: 32.8 G/DL (ref 31.5–36.5)
MCV RBC AUTO: 93 FL (ref 78–100)
MONOCYTES # BLD AUTO: 0.4 10E3/UL (ref 0–1.3)
MONOCYTES NFR BLD AUTO: 8 %
NEUTROPHILS # BLD AUTO: 2.9 10E3/UL (ref 1.6–8.3)
NEUTROPHILS NFR BLD AUTO: 57 %
NITRATE UR QL: NEGATIVE
NRBC # BLD AUTO: 0 10E3/UL
NRBC BLD AUTO-RTO: 0 /100
PH UR STRIP: 5.5 [PH] (ref 5–7)
PLATELET # BLD AUTO: 207 10E3/UL (ref 150–450)
POTASSIUM SERPL-SCNC: 4.1 MMOL/L (ref 3.4–5.3)
PROT SERPL-MCNC: 6.6 G/DL (ref 6.4–8.3)
RBC # BLD AUTO: 4.08 10E6/UL (ref 3.8–5.2)
RBC URINE: <1 /HPF
SODIUM SERPL-SCNC: 139 MMOL/L (ref 135–145)
SP GR UR STRIP: 1.01 (ref 1–1.03)
SQUAMOUS EPITHELIAL: 1 /HPF
TROPONIN T SERPL HS-MCNC: <6 NG/L
UROBILINOGEN UR STRIP-MCNC: <2 MG/DL
WBC # BLD AUTO: 5.2 10E3/UL (ref 4–11)
WBC URINE: 1 /HPF

## 2023-12-06 PROCEDURE — 93005 ELECTROCARDIOGRAM TRACING: CPT | Performed by: STUDENT IN AN ORGANIZED HEALTH CARE EDUCATION/TRAINING PROGRAM

## 2023-12-06 PROCEDURE — 81001 URINALYSIS AUTO W/SCOPE: CPT | Performed by: EMERGENCY MEDICINE

## 2023-12-06 PROCEDURE — 99284 EMERGENCY DEPT VISIT MOD MDM: CPT

## 2023-12-06 PROCEDURE — 250N000011 HC RX IP 250 OP 636: Mod: JZ | Performed by: EMERGENCY MEDICINE

## 2023-12-06 PROCEDURE — 82248 BILIRUBIN DIRECT: CPT | Performed by: EMERGENCY MEDICINE

## 2023-12-06 PROCEDURE — 36415 COLL VENOUS BLD VENIPUNCTURE: CPT | Performed by: EMERGENCY MEDICINE

## 2023-12-06 PROCEDURE — 82374 ASSAY BLOOD CARBON DIOXIDE: CPT | Performed by: EMERGENCY MEDICINE

## 2023-12-06 PROCEDURE — 82435 ASSAY OF BLOOD CHLORIDE: CPT | Performed by: EMERGENCY MEDICINE

## 2023-12-06 PROCEDURE — 84484 ASSAY OF TROPONIN QUANT: CPT | Performed by: EMERGENCY MEDICINE

## 2023-12-06 PROCEDURE — 93005 ELECTROCARDIOGRAM TRACING: CPT | Performed by: EMERGENCY MEDICINE

## 2023-12-06 PROCEDURE — 85025 COMPLETE CBC W/AUTO DIFF WBC: CPT | Performed by: EMERGENCY MEDICINE

## 2023-12-06 RX ORDER — ASPIRIN 81 MG/1
324 TABLET, CHEWABLE ORAL ONCE
Status: DISCONTINUED | OUTPATIENT
Start: 2023-12-06 | End: 2023-12-06

## 2023-12-06 RX ADMIN — FAMOTIDINE 20 MG: 10 INJECTION, SOLUTION INTRAVENOUS at 14:01

## 2023-12-06 NOTE — ED PROVIDER NOTES
EMERGENCY DEPARTMENT ENCOUNTER      NAME: Amada Melo  AGE: 33 year old female  YOB: 1990  MRN: [id number]  EVALUATION DATE & TIME: No admission date for patient encounter.    PCP: Gretchen Orozco    ED PROVIDER: Les Santa M.D.      Chief Complaint   Patient presents with    Chest Pain    Headache         FINAL IMPRESSION:  No diagnosis found.      ED COURSE & MEDICAL DECISION MAKING:    Pertinent Labs & Imaging studies reviewed. (See chart for details)  33 year old female presents to the Emergency Department for evaluation of chest pain, headache.  Patient recently hospitalized for delivery.  Did have hypertension at that time but no overt preeclampsia.  Was discharged with antihypertensive but discontinued recently due to normalization of pressure.  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  No prior similar episodes.  On exam patient is mildly obese female mild distress.  Vital signs significant for mild hypertension blood pressure 141/98.  Heart and lungs unremarkable.  Abdomen obese soft nontender.  Negligible lower extremity edema.  Patient with recent delivery and hypertension with repeat symptomatology.  Unlikely to represent eclampsia issues given overall presentation.  Will obtain comprehensive metabolic and CBC assess for evidence of eclampsia.  Urine analysis also be obtained.  Patient appears non toxic with stable vitals signs. Overall exam is benign.    1:09 PM I met with the patient for the initial interview and physical examination. Discussed plan for treatment and workup in the ED.    1:50 PM.  Troponin normal.  Liver function test normal.  Basic metabolic panel normal.  Urine pending.  2:15 PM.  Urine analysis unremarkable.  No evidence of proteinuria.  Patient with borderline hypertension secondary chest pain.  No indications of serious pathology.  Patient reassured regards findings.  Will continue outpatient management.   Tylenol recommended for discomfort.  Medical Decision Making    History:  Supplemental history from: N/A  External Record(s) reviewed: Inpatient Record: Larry's admission from -     Work Up:  Chart documentation includes differential considered and any EKGs or imaging independently interpreted by provider, where specified.  In additional to work up documented, I considered the following work up: Documented in chart, if applicable.    External consultation:  Discussion of management with another provider:     Complicating factors:  Care impacted by chronic illness: Chronic Lung Disease and Mental Health  Care affected by social determinants of health: Access to Medical Care    Disposition considerations: Discharge. No recommendations on prescription strength medication(s). See documentation for any additional details.    At the conclusion of the encounter I discussed the results of all of the tests and the disposition. The questions were answered and return precautions provided. The patient or family acknowledged understanding and was agreeable with the care plan.      MEDICATIONS GIVEN IN THE EMERGENCY:  Medications - No data to display      NEW PRESCRIPTIONS STARTED AT TODAY'S ER VISIT  New Prescriptions    No medications on file        =================================================================    HPI    Patient information was obtained from: Patient    Use of Intrepreter: N/A     Amada Melo is a 33 year old female with a pertinent medical history of asthma and recent  on  with eclampsia who presents to the ED for evaluation of chest pain.    Per chart review, patient was admitted to this hospital from  -  for labor and delivery.  Underwent , no perioperative complications.  Psych consulted, but patient discharged in stable condition with 60 mg nifedipine twice daily, Zoloft, Concerta, and 5 mg oxycodone x6 tablets.    Patient reports a sudden onset of midsternal chest  pain and tightness that began while driving home earlier today.  No history of similar symptoms.  On her recent hospitalization, she notes that her blood pressures have been improving so she was discontinued on the antihypertensive last week.    Of note, patient denies any recent exacerbations in her asthma.    REVIEW OF SYSTEMS   See HPI    PAST MEDICAL HISTORY:  Past Medical History:   Diagnosis Date    ADD (attention deficit disorder)     Genital herpes     Incomplete miscarriage 12/19/2022    Miscarriage 12/19/2022    PTSD (post-traumatic stress disorder)     Superficial thrombophlebitis during pregnancy in first trimester 11/24/2018    Formatting of this note might be different from the original. Consult Dr. Manuel; patient completed 45 days Lovenox.    Uncomplicated asthma        PAST SURGICAL HISTORY:  Past Surgical History:   Procedure Laterality Date    DILATION AND CURETTAGE  2018    DILATION AND CURETTAGE SUCTION N/A 12/18/2022    Procedure: DILATION AND CURETTAGE, UTERUS, USING SUCTION;  Surgeon: Alicia Mota MD;  Location: [facility] OR    ENT SURGERY      PELVIC EXAMINATION UNDER ANESTHESIA N/A 12/18/2022    Procedure: EXAM UNDER ANESTHESIA, GYNECOLOGIC;  Surgeon: Alicia Moat MD;  Location: [facility] OR    TONSILLECTOMY      WISDOM TOOTH EXTRACTION         CURRENT MEDICATIONS:    No current facility-administered medications for this encounter.    Current Outpatient Medications:     acetaminophen (TYLENOL) 500 MG tablet, Take 1-2 tablets (500-1,000 mg) by mouth every 6 hours as needed, Disp: 60 tablet, Rfl: 0    albuterol (PROAIR HFA, PROVENTIL HFA, VENTOLIN HFA) 108 (90 BASE) MCG/ACT inhaler, Inhale 2 puffs into the lungs every 4 hours as needed, Disp: , Rfl:     gabapentin (NEURONTIN) 100 MG capsule, Take 3 capsules (300 mg) by mouth 3 times daily as needed for other (anxiety), Disp: 90 capsule, Rfl: 0    gabapentin (NEURONTIN) 100 MG capsule, Take 1 capsule (100  "mg) by mouth 3 times daily as needed (anxiety), Disp: 90 capsule, Rfl: 0    ibuprofen (ADVIL/MOTRIN) 600 MG tablet, Take 1 tablet (600 mg) by mouth every 6 hours as needed, Disp: 40 tablet, Rfl: 0    methylphenidate HCl ER, OSM, (CONCERTA) 18 MG CR tablet, , Disp: , Rfl:     NIFEdipine ER (ADALAT CC) 60 MG 24 hr tablet, Take 1 tablet (60 mg) by mouth daily, Disp: 30 tablet, Rfl: 0    sertraline (ZOLOFT) 50 MG tablet, Take 1 tablet (50 mg) by mouth daily for 30 days, Disp: 30 tablet, Rfl: 0    ALLERGIES:  Allergies   Allergen Reactions    Amoxicillin-Pot Clavulanate Hives    Adhesive Tape      Other reaction(s): Contact Dermatitis  blisters  Chemical burn like reaction to steri strips      Nickel      Skin irritation     Penicillins Hives    Steri Strips      blister       FAMILY HISTORY:  Family History   Problem Relation Age of Onset    Depression Mother     Depression Father     Asthma Mother     Cervical Cancer Mother     Cerebrovascular Disease Maternal Grandmother     Hypercalcemia Paternal Grandmother     Hypertension Paternal Grandmother        SOCIAL HISTORY:   Social History     Socioeconomic History    Marital status: Single     Spouse name: seperated for 4 years    Number of children: 1    Years of education: None    Highest education level: None   Occupational History    Occupation: unemployed     Employer: [employer]   Tobacco Use    Smoking status: Every Day     Packs/day: .5     Types: Cigarettes    Smokeless tobacco: Never    Tobacco comments:     5 cigs per day   Substance and Sexual Activity    Alcohol use: Not Currently     Comment: Alcoholic Drinks/day: \"sometimes\" - not in pregnancy    Drug use: Not Currently    Sexual activity: Yes     Partners: Male     Social Determinants of Health     Financial Resource Strain: Low Risk  (11/27/2023)    Financial Resource Strain     Within the past 12 months, have you or your family members you live with been unable to get utilities (heat, electricity) when " "it was really needed?: No   Food Insecurity: High Risk (11/27/2023)    Food Insecurity     Within the past 12 months, did you worry that your food would run out before you got money to buy more?: Yes     Within the past 12 months, did the food you bought just not last and you didn t have money to get more?: Yes   Transportation Needs: Low Risk  (11/27/2023)    Transportation Needs     Within the past 12 months, has lack of transportation kept you from medical appointments, getting your medicines, non-medical meetings or appointments, work, or from getting things that you need?: No   Interpersonal Safety: Low Risk  (11/27/2023)    Interpersonal Safety     Do you feel physically and emotionally safe where you currently live?: Yes     Within the past 12 months, have you been hit, slapped, kicked or otherwise physically hurt by someone?: No     Within the past 12 months, have you been humiliated or emotionally abused in other ways by your partner or ex-partner?: No   Housing Stability: Low Risk  (11/27/2023)    Housing Stability     Do you have housing? : Yes     Are you worried about losing your housing?: No       VITALS:  Patient Vitals for the past 24 hrs:   BP Temp Temp src Pulse Resp SpO2 Height Weight   12/06/23 1257 -- -- -- -- -- -- 1.803 m (5' 11\") 110.6 kg (243 lb 13.3 oz)   12/06/23 1252 (!) 141/98 98  F (36.7  C) Oral 98 19 99 % -- --        PHYSICAL EXAM    Constitutional:  Awake, alert, in minimal apparent distress  HENT:  Normocephalic, Atraumatic. Bilateral external ears normal. Oropharynx moist. Nose normal. Neck- Normal range of motion with no guarding, No midline cervical tenderness, Supple, No stridor.   Eyes:  PERRL, EOMI with no signs of entrapment, Conjunctiva normal, No discharge.   Respiratory:  Normal breath sounds, No respiratory distress, No wheezing.    Cardiovascular:  Normal heart rate, Normal rhythm, No appreciable rubs or gallops.   GI:  Soft, No tenderness, No distension, No palpable " masses  Musculoskeletal:  Intact distal pulses, No edema. Good range of motion in all major joints. No tenderness to palpation or major deformities noted. Right forearm with multiple transverse scars.  Integument:  Warm, Dry, No erythema, No rash.   Neurologic:  Alert & oriented, Normal motor function, Normal sensory function, No focal deficits noted.   Psychiatric:  Affect normal, Judgment normal, Mood normal.     LAB:  All pertinent labs reviewed and interpreted.     Results for orders placed or performed during the hospital encounter of 12/06/23   Basic metabolic panel     Status: Normal   Result Value Ref Range    Sodium 139 135 - 145 mmol/L    Potassium 4.1 3.4 - 5.3 mmol/L    Chloride 104 98 - 107 mmol/L    Carbon Dioxide (CO2) 28 22 - 29 mmol/L    Anion Gap 7 7 - 15 mmol/L    Urea Nitrogen 11.2 6.0 - 20.0 mg/dL    Creatinine 0.69 0.51 - 0.95 mg/dL    GFR Estimate >90 >60 mL/min/1.73m2    Calcium 9.2 8.6 - 10.0 mg/dL    Glucose 89 70 - 99 mg/dL   Troponin T, High Sensitivity     Status: Normal   Result Value Ref Range    Troponin T, High Sensitivity <6 <=14 ng/L   Kiln Draw     Status: None (In process)    Narrative    The following orders were created for panel order Kiln Draw.  Procedure                               Abnormality         Status                     ---------                               -----------         ------                     Extra Red Top Tube[[id number]]                               In process                 Extra Green Top (Lithium...[[id number]]                                                 Extra Purple Top Tube[[id number]]                                                         Please view results for these tests on the individual orders.   CBC with platelets and differential     Status: None   Result Value Ref Range    WBC Count 5.2 4.0 - 11.0 10e3/uL    RBC Count 4.08 3.80 - 5.20 10e6/uL    Hemoglobin 12.5 11.7 - 15.7 g/dL    Hematocrit 38.1 35.0 - 47.0 %    MCV 93 78 -  100 fL    MCH 30.6 26.5 - 33.0 pg    MCHC 32.8 31.5 - 36.5 g/dL    RDW 12.2 10.0 - 15.0 %    Platelet Count 207 150 - 450 10e3/uL    % Neutrophils 57 %    % Lymphocytes 32 %    % Monocytes 8 %    % Eosinophils 3 %    % Basophils 0 %    % Immature Granulocytes 0 %    NRBCs per 100 WBC 0 <1 /100    Absolute Neutrophils 2.9 1.6 - 8.3 10e3/uL    Absolute Lymphocytes 1.7 0.8 - 5.3 10e3/uL    Absolute Monocytes 0.4 0.0 - 1.3 10e3/uL    Absolute Eosinophils 0.2 0.0 - 0.7 10e3/uL    Absolute Basophils 0.0 0.0 - 0.2 10e3/uL    Absolute Immature Granulocytes 0.0 <=0.4 10e3/uL    Absolute NRBCs 0.0 10e3/uL   Hepatic function panel     Status: Normal   Result Value Ref Range    Protein Total 6.6 6.4 - 8.3 g/dL    Albumin 3.9 3.5 - 5.2 g/dL    Bilirubin Total <0.2 <=1.2 mg/dL    Alkaline Phosphatase 91 40 - 150 U/L    AST 14 0 - 45 U/L    ALT 17 0 - 50 U/L    Bilirubin Direct <0.20 0.00 - 0.30 mg/dL   UA with Microscopic reflex to Culture     Status: Abnormal    Specimen: Urine, Midstream   Result Value Ref Range    Color Urine Light Yellow Colorless, Straw, Light Yellow, Yellow    Appearance Urine Clear Clear    Glucose Urine Negative Negative mg/dL    Bilirubin Urine Negative Negative    Ketones Urine Negative Negative mg/dL    Specific Gravity Urine 1.014 1.001 - 1.030    Blood Urine 0.1 mg/dL (A) Negative    pH Urine 5.5 5.0 - 7.0    Protein Albumin Urine Negative Negative mg/dL    Urobilinogen Urine <2.0 <2.0 mg/dL    Nitrite Urine Negative Negative    Leukocyte Esterase Urine Negative Negative    RBC Urine <1 <=2 /HPF    WBC Urine 1 <=5 /HPF    Squamous Epithelials Urine 1 <=1 /HPF    Narrative    Urine Culture not indicated   CBC with Platelets & Differential     Status: None    Narrative    The following orders were created for panel order CBC with Platelets & Differential.  Procedure                               Abnormality         Status                     ---------                               -----------          ------                     CBC with platelets and d...[[id number]]                      Final result                 Please view results for these tests on the individual orders.        RADIOLOGY:  Reviewed all pertinent imaging. Please see official radiology report.  No orders to display     EKG:    Normal sinus rhythm.  Rate of 80.  Normal QRS.  Normal ST segments are normal EKG.  No prior tracing  I have independently reviewed and interpreted the EKG(s) documented above.    I, Murtaza Haddad, am serving as a scribe to document services personally performed by Les Santa MD, based on my observation and the provider's statements to me. I, Les Santa MD attest that Murtaza Haddad is acting in a scribe capacity, has observed my performance of the services and has documented them in accordance with my direction.    Les Santa M.D.  Emergency Medicine  [facility] EMERGENCY DEPARTMENT     Les Santa MD  12/06/23 1417

## 2023-12-06 NOTE — PROGRESS NOTES
[facility] Lactation Visit     Assessment:      difficulty in feeding at breast      , gestational age 35 completed weeks     Raul has had slower than ideal weight gain at approximately 0.7 oz/day. This is likely due to  status and tiring with feeds. He was able to latch to both breasts today aided by nipple shield - without nipple shield in place he could latch briefly but quickly tired.      We discussed continuing breastfeeding sessions 2 - 3 times per day for now but that given his transfer of 0.7 oz he will continue to need supplementation by bottle for now. I recommended fortifying breast milk with Neosure or Enfacare to 22 kcal to help boost growth.      Mom's milk supply appears slightly low - likely due to ineffective milk transfer by Raul. Discussed additional pumping to help boost supply.      We discussed the safety of mom's previous medications that she would like to re-start in relation to compatibility with breastfeeding. For all the medications discussed, the benefits of continuing these medications likely outweigh the risks to Raul. We discussed monitoring his behavior and re-starting medications one at a time.      Raul will follow up in 1 week for another lactation appointment.      Plan:        Patient Instructions   Fioricet - L3 - no data, probably compatible with breastfeeding.   Paxil - L2 - Limited data, probably compatible. Monitor baby for sedation or irritability, not waking to feed, poor weight gain.   Adderall - L3 - Limited data, probably compatible. Monitor baby for Agitation, insomnia, decreased appetite, tremors.      Average Infant Milk Intake by Age     Age Average milk volume per feeding (mL) Average milk volume per feeding (oz) Average 24 hour milk intake (mL) Average 24 hour milk intake (oz)   Day 1 Few drops - 5mL < tsp Up to 30 mL Up to 1 oz   Day 2 5 - 15 mL <0.5 oz - 1 TB 30 - 120 mL 1 - 4 oz   Day 3 15 - 30 mL   "0.5 - 1 oz 120 - 240 mL 4 - 8 oz   Day 4 30 - 45 mL  1 - 1.5 oz 240 - 360 mL 8 - 12 oz   Day 5-7 45 - 60 mL 1.5 - 2 oz 360 - 600 mL 12 - 18 oz   Week 2-3 60 - 90 mL 2 - 3 oz 450 - 750 mL 15 - 25 oz   Months 1-6 90 - 150 mL 3 - 5 oz 750 - 1035 mL 25 - 35 oz      Raul took 0.1 oz from the L side, 0.6 oz from the R - 0.7 oz total.      Continue to breastfeed, about 2- 3 times per day for about 10 - 20 minutes. Supplement afterwards.       Offer both sides every time, and alternate which breast you start on. Latch baby deeply by making a \"breast sandwich,\" and aim your nipple for the roof of the mouth. If baby's lips are rolled inward, flip the top lip out with your finger, and then apply gentle downward pressure to the chin to help the lips flange out like \"fish lips.\" If you have pain that lasts beyond the initial latch-on, always restart. When sucking/swallowing frequency starts to slow down, do breast compressions/massage and tickle baby's feet to keep him alert with feeding. A diaper change between sides can be helpful to keep him alert.     Supplementation plan:Continue to supplement after every feeding. I would recommend 22 kcal bottles for Raul - see the recipe below for adding formula to your breast milk.        Recommended to pump: Continue to pump 7 - 8 times per day to maximize your milk supply.     Continue to monitor output, expect at least 6 wet diapers per day.   Recommended Vitamin D 400 IU daily.         Return in about 1 week (around 12/12/2023) for Lactation follow up.     Below are recipes for fortified breast milk and concentrated formula mixed to 22 kcal/oz. Once mixed, store in the refrigerator for up to 24 hours. After baby's mouth has touched the bottle, discard within 1 hour.   22 Kcal/oz Formula Recipes   For Similac Advance, SimilacSensitive, Total Comfort, Similac Spit Up, Isomil, Enfamil NeuroPro, Enfamil Gentlease, [place] Good Start Gentle Pro & Alimentum formulas:   - 165 mL " "(5.5 ounces) of water + 3 scoops (level & unpacked) of formula powder     For Neosure or Enfacare Formula, mix as directed on the container.         Fortifying Breast Milk to 22 kcal/oz:   Recipes for fortifying Breast milk to 22 Kcal/oz using NeoSure, Enfacare, Similac Advance, Similac Sensitive, Similac TotalComfort, Enfamil Neuro Pro Infant, Enfamil Gentlease, Bin Good Start GentlePro, and Alimentum formulas:   - In 40 mL EBM add 1/4 teaspoon (level and unpacked) of formula powder - In 80 mL EBM add 1/2 teaspoon (level and unpacked) of formula powder            ------------------------------------------------------------------------------------------------  Information for breastfeeding families on Increasing breastmilk supply      Frequent stimulation of the breasts, bybreastfeeding or by using a breast pump, during the first few days and weeks, is essential to establish an abundant breastmilk supply. If you find your milk supply is low, try the following recommendations. If you areconsistent you will likely see an improvement within a few days. Although it may take a month or more to bring your supply up to meet your baby's needs, you will see steady, gradual improvement. You will be glad that you putthe time and effort into breastfeeding and so will your baby.      More breast stimulation  Breastfeed more often, at least 8-12 times per 24 hours.   Limit the use of a pacifier (so that when the baby wants to suck, they are stimulating the breasts for milk production)  Try to get in \"one more feeding\" before you go to sleep, this can be done as a \"dream feed\" where you feed your baby while they sleep.  Offer both breasts at each feeding  \"Burp and switch\" using each breast twice or three times, and using different positions  \"Top up feeds\" give a short feeding in 10-20 minutes if baby seems hungry  Empty your breasts well by massaging while the baby is feeding  Assure the baby is completely emptying your " "breasts at each feeding  Try breast massage/ compression - pushing milk tobaby during a feeding     Avoid these things that are known to reduce breastmilk supply  Smoking  Caffeine (in excess - it is ok to drink your morning coffee!)   Birth control pills and injections  Decongestants, antihistamines like Benadryl, NyQuil or Sudafed. If you need relief for allergies, Zyrtec or Claritin are better choices that are less likely to decreasesupply.   Severe weight loss diets. A vegan or \"keto\" diet may also decrease supply due to inadequate protein or carbohydrates.   Mints, parsley, hermila in excessive amounts (avoid Altoid mints or mint tea, for example)     Use a breast pump  Consider use of a hospital grade breast pump with a double kit  Pump after feedings, up to 20 minutes after you finish nursing (an empty breast makes more milk)  Rest 10-15 minutes prior to pumping, eat and drink something  Apply warmth to your breasts and massage before beginning to pump  Try \"power pumping\".Pumping up to 12 x a day for 2-3 days after a feeding, even for a short time OR Try pumping for 10min, resting for 10 min, pumping 10 min etc for an hour once or more times per day. It can help to relax and watch an hour-long TV show while you try this.    To make pumping easier, you can rinse and refrigerate your pump parts between feedings, storing them in a Ziploc bag or Tupperware container. Wash them well at least twice per day. If your baby is premature or immunocompromised it is a better practice to wash them after each use. Most pump parts can be washed in a  on the top rack (verify with the  first).    A \"hands-free\" pumping bra can make pumping easier. This frees your hands while you pump to do breast massage or to eat or drink while you pump.   A portable pump + \"freemie cups\" can make pumping easier to fit into your routine. Https://freemie.com/     Condition your let-down reflex  Play relaxing music  Imagine " "your baby, look at pictures of your baby, smell baby clothing or baby powder  Watch videos of your baby  Always pumpin the same quiet, relaxed place, set up a routine  Do slow, deep, relaxed breathing, relax your shoulders     Mother care  Reduce stress and activity, get help  Increase fluid intake. Aim for  oz/day of fluids. Electrolytes (like in coconut water) may be helpful.   Eat nutritious meals, continue to take prenatal vitamins.   Back rubs stimulate nerves that serve the breasts (central part of the spine)  Increase skin-to-skin holding time with your baby, relax together  Take a warm, bath, read, meditate, and empty your mind of tasks that need to be done     Food and medications  Eat a bowl of cooked oatmeal daily  See's yeast or ground flax seeds, 1 teaspoon one or more times daily (try mixing into oatmeal or baking into lactation cookies). See's yeast is not recommended for women with hypertension or a history of hypertension.   \"Moringa\" or \"Malunggay\" is an herb that is a \"super food\" and is well tolerated and can help increase supply. This herb is available through GoLacta supplements, Borqs (use promo code PRO15 for 15% off) or other suppliers as a powder (to mix into smoothies, for example) or capsules. Herbs unfortunately are not regulated by the FDA so you have to do your own homework and choose a brand that seems reputable.   Goat's Rue is an herbal remedy intended to help increase the glandular tissue in women's breasts. This can be a powerful galactogogue (substance to increase milk supply). Goat's rue is not recommended for women with a history of hypoglycemia or who are taking insulin.   Fenugreek preparations can help some increase supply, though anecdotally others have found that it does not help their supply or even decreases supply. Because of this, I do not routinely recommend it. Use of this herb has not been formally studied. Doses of 3-5 capsules (580-610 mg) three " times per day are commonly recommended. Avoid fenugreek if you are diabetic, hypoglycemic, asthmatic or allergic to peanuts or other legumes or beans. Taken as directed, it may cause a faint maple body odor. That is to be expected and means that the herb is doing its job. To read more about fenugreek, go to http://www.breastfeeding.com/all_about/all_about_fenugreek.html  Blessed thistle or other herbs or beverages such as Mother's Milk Tea taken as directed on the package. A reliable sources of herbs and herbal blends is Mother Love Herbals and Geraldine Herbs.  Prescription medication sometimes help increase milk supply. Metaclopromide (Reglan) has been used with limited success. Domperidone has been used with more success, but is not FDA approved in the [place].      The Sai brand has several good options - Milkapalooza (moringa based), Liquid Gold (Goat's rue based), and Cash Cow (Contains both moringa and goat's rue).      Keep records  It is important to keep a daily log with the number of nursing + pumping sessions, amount obtained amount you are having to supplement your baby and 24 hour totals, this amount is more important that the pumped amount at each session. This will help you see your progress over the days.  Keep in touch with your health care provider so he/she can monitor your progress over the days and modify advice if necessary.      Retained placenta  If you are not seeing improvement and you are having any heavy bleeding, discuss the possibility of retained placental fragments with your MD or midwife. Small bits of the placenta can secrete enough hormones to prevent the milk from coming in.     Low thyroid  Have your health care provider check your thyroid levels. Low thyroid can affect milk supply. If you have been taking thyroid medication, have your levels checked after delivery, you may need your medication adjusted.      Otherresources: http://www.lowmilksupply.org     mediafeedia Hand Expression  Video [email or web address]      Maximizing Milk Production Video;[email or web address]                              Return in about 1 week (around 12/12/2023) for Lactation follow up.        SUBJECTIVE:      Raul is here today with mom, Rolanda, for lactation support. He is a 2 week old male born at Gestational Age: 35w5d now 17 days.    He is having difficulties with feeding. He has gained 2.7 oz since last visit 4 days ago. He has gained approximately 0.7 oz per day over the past 4 days and is now 1% from birth weight.   .  Peq Lactation Visit Questionnaire     Question 12/5/2023  3:40 PM CST - Filed by Patient   What is your main concern today? latching with inversion and medication transfer to breastmilk, weight check   Your baby's first name: Raul   Your baby's last name: Heriberto   Type of Birth Vaginal   Your doctor/midwife: Gretchen Orozco   Baby's doctor or nurse practitioner: Dr. Bell,    Baby's birthday: 11/19/2025   Birth weight: 5lbs 13.8oz   Baby's weight just before leaving the hospital:     Baby's most recent weight: 5lb 12oz   Date: 12/1/23   How often does your baby eat? 2-3 hours   How long does each feeding last? 10-15min   How much of the time does your baby take both breasts when nursing? 0   Can you hear the baby swallowing during nursing? Yes   How many times does your baby feed in 24 hours? 10   How many times does your baby urinate (pee) in 24 hours?     How many stools (poops) does your baby have in 24 hours?     Describe the color and consistency of the poop:     Do you give your baby extra milk in addition to or instead of breastfeeding? Both   How much extra do you usually give? 2oz   How do you give extra milk? Bottle   Are you pumping your breasts? Yes   How often? 2-3 hrs   How much is pumped? varies,1-4oz   When you were pregnant did your breasts grow larger? No   Did your areola  (the dark area around your nipple) grow larger or darker? Yes   Did you notice your breasts fill when your baby was 3-5 days old? Yes   Have you had any breast surgeries? No   Please select any of the following medical conditions you have been previously diagnosed with or are currently being treated for: High Blood Pressure     Depression     Anxiety     Other   If other, please elaborate: ptsd, asthma, genital herpes, carpal tunnel   What else would you like the lactation consultant to know? lost nipple shield for several days and just got new ones so he couldnt latch and was bottle fed during that time. hectic schedule makes pumping often enough challenging,milk supply has been inconsistent as a result. im hoping to resume mental health meds         Breastfeeding Goals: Hoping to nurse vs. Pump but would like to provide breast milk for at least a year.      Previous Breastfeeding Experience: Exclusively pumped with one child, difficulty breastfeeding first child.      Breast-surgery: None     Maternal medications: Oxycodone (severe headaches since delivery), gabapentin, concerta, paxil,       Maternal Health conditions: Mental health - see above.         No results found for any visits on 12/05/23.  No current outpatient medications on file.  Past Medical History        Past Medical History:   Diagnosis Date    Failure to tolerate infant car seat test      Family history of herpes genitalis      Feeding problem           Past Surgical History   History reviewed. No pertinent surgical history.     Family History   No family history on file.           Primary care provider: Aelx Molina     OBJECTIVE:     Mother:   Nipples are everted on the R, inverted on the L, the areola is compressible, the breast is soft and full.      Sore nipples: None   Maternal depression screening: Referral to maternal PCP  EPDS: Referral to maternal PCP made     Infant:      Age today: 17 days     Wt 5 lb 14.7 oz (2.685 kg)          Weight:       Wt Readings from Last 3 Encounters:   12/05/23 5 lb 14.7 oz (2.685 kg) (<1%, Z= -2.60)*   12/01/23 5 lb 12 oz (2.608 kg) (<1%, Z= -2.50)*   11/29/23 5 lb 10 oz (2.551 kg) (<1%, Z= -2.50)*      * Growth percentiles are based on WHO (Boys, 0-2 years) data.         Birthweight:  5 lbs 13.83 oz.   Today's weight:  5 lbs 14.7 oz This is 1% from birth weight.         Test weights:     LEFT side: 0.1 oz  RIGHT side: 0.6 oz     TOTAL transfer:  0.7 oz         Feeding assessment:      Digital suck assessment:  Infant draws consultant's finger into mouth, palate intact, tongue over gums, normal frenulum.      Baby can hold suction with tongue while at the breast.      Alignment: Baby's head was aligned with its trunk. Baby did face mother. Baby was in cross cradle position today.      Areolar Grasp: Baby was able to open mouth wide. Baby's lips were not pursed. Baby's lips did flange outward. Tongue was visible just barely over bottom lip. Baby had complete seal.      Areolar Compression: Baby made rhythmic motion. There were no clicking or smacking sounds. There was no severe nipple discomfort.  Nipples appeared round after feeding.     Audible swallowing: Baby made quiet sounds of swallowing: There was an increase in frequency after milk ejection reflex. The milk ejection reflex is appropriate and milk supply appears adequate.      PHYSICAL EXAM     Gen: Alert, no acute distress.   Head: Anterior fontanelle flat and soft.   Mouth:Lips pink. Oral mucosa moist. Tongue midline (good lateralization, movement, and lift; able to extend pass lower gumline).  Palate intact. Coordinated suck.  Lungs: Clear to auscultation bilaterally.   Cardiac: Regular regular rate and rhythm, S1S2, no murmurs.  Abdomen: Soft, nontender, bowel sounds present, no hepatosplenomegaly or mass palpable. Umbilicus dry with no erythema or drainage.   : Silverio stage 1 male genitalia  Skin: Intact, dry, appropriate coloring for ethnicity,  no jaundice.   Neuro: Appropriate muscle tone.     The visit lasted a total of 70 minutes that I spent on this visit today. This time includes pre-charting, review of the chart, and face to face time with the patient.      Completed by:   CHLOE Sanders, IBCLC, [facility], Pediatrics.  12/5/2023 4:01 PM

## 2023-12-09 LAB
ATRIAL RATE - MUSE: 80 BPM
DIASTOLIC BLOOD PRESSURE - MUSE: NORMAL MMHG
INTERPRETATION ECG - MUSE: NORMAL
P AXIS - MUSE: 65 DEGREES
PR INTERVAL - MUSE: 172 MS
QRS DURATION - MUSE: 86 MS
QT - MUSE: 366 MS
QTC - MUSE: 422 MS
R AXIS - MUSE: 66 DEGREES
SYSTOLIC BLOOD PRESSURE - MUSE: NORMAL MMHG
T AXIS - MUSE: 28 DEGREES
VENTRICULAR RATE- MUSE: 80 BPM

## 2024-01-24 ENCOUNTER — HOSPITAL ENCOUNTER (OUTPATIENT)
Facility: AMBULATORY SURGERY CENTER | Age: 34
End: 2024-01-24
Attending: OBSTETRICS & GYNECOLOGY
Payer: COMMERCIAL

## 2024-01-26 RX ORDER — ACETAMINOPHEN 325 MG/1
975 TABLET ORAL ONCE
Status: CANCELLED | OUTPATIENT
Start: 2024-01-26 | End: 2024-01-26

## 2024-01-30 ENCOUNTER — E-VISIT (OUTPATIENT)
Dept: URGENT CARE | Facility: CLINIC | Age: 34
End: 2024-01-30
Payer: COMMERCIAL

## 2024-01-30 DIAGNOSIS — N39.0 ACUTE UTI (URINARY TRACT INFECTION): Primary | ICD-10-CM

## 2024-01-30 PROCEDURE — 99207 PR NON-BILLABLE SERV PER CHARTING: CPT | Performed by: NURSE PRACTITIONER

## 2024-01-30 RX ORDER — NITROFURANTOIN 25; 75 MG/1; MG/1
100 CAPSULE ORAL 2 TIMES DAILY
Qty: 10 CAPSULE | Refills: 0 | Status: SHIPPED | OUTPATIENT
Start: 2024-01-30 | End: 2024-02-04

## 2024-01-30 NOTE — PATIENT INSTRUCTIONS
Dear Amada Melo    After reviewing your responses, I've been able to diagnose you with a urinary tract infection, which is a common infection of the bladder with bacteria.  This is not a sexually transmitted infection, though urinating immediately after intercourse can help prevent infections.  Drinking lots of fluids is also helpful to clear your current infection and prevent the next one.      I have sent a prescription for antibiotics to your pharmacy to treat this infection.    It is important that you take all of your prescribed medication even if your symptoms are improving after a few doses.  Taking all of your medicine helps prevent the symptoms from returning.     If your symptoms worsen, you develop pain in your back or stomach, develop fevers, or are not improving in 5 days, please contact your primary care provider for an appointment or visit any of our convenient Walk-in or Urgent Care Centers to be seen, which can be found on our website here.    Thanks again for choosing us as your health care partner,    NAKIA Apodaca CNP

## 2024-02-02 RX ORDER — SODIUM CHLORIDE, SODIUM LACTATE, POTASSIUM CHLORIDE, CALCIUM CHLORIDE 600; 310; 30; 20 MG/100ML; MG/100ML; MG/100ML; MG/100ML
INJECTION, SOLUTION INTRAVENOUS CONTINUOUS
Status: CANCELLED | OUTPATIENT
Start: 2024-02-02

## 2024-02-02 RX ORDER — ACETAMINOPHEN 325 MG/1
975 TABLET ORAL ONCE
Status: CANCELLED | OUTPATIENT
Start: 2024-02-02 | End: 2024-02-02

## 2024-02-02 RX ORDER — LIDOCAINE 40 MG/G
CREAM TOPICAL
Status: CANCELLED | OUTPATIENT
Start: 2024-02-02

## 2024-03-17 ENCOUNTER — E-VISIT (OUTPATIENT)
Dept: URGENT CARE | Facility: CLINIC | Age: 34
End: 2024-03-17
Payer: COMMERCIAL

## 2024-03-17 DIAGNOSIS — R30.0 DYSURIA: Primary | ICD-10-CM

## 2024-03-17 PROCEDURE — 99207 PR NON-BILLABLE SERV PER CHARTING: CPT | Performed by: PHYSICIAN ASSISTANT

## 2024-03-17 NOTE — PATIENT INSTRUCTIONS
Dear Amada Melo,     After reviewing your responses, I would like you to come in for a urine test to make sure we treat you correctly. This urine test is to evaluate you for a possible urinary tract infection, and should be scheduled for today or tomorrow. Schedule a Lab Only appointment here.     Lab appointments are not available at most locations on the weekends. If no Lab Only appointment is available, you should be seen in any of our convenient Walk-in or Urgent Care Centers, which can be found on our website here.     You will receive instructions with your results in [facility] once they are available.     If your symptoms worsen, you develop pain in your back or stomach, develop fevers, or are not improving in 5 days, please contact your primary care provider for an appointment or visit a Walk-in or Urgent Care Center to be seen.     Thanks again for choosing us as your health care partner,     Karina Dudley PA-C

## 2024-11-24 ENCOUNTER — HEALTH MAINTENANCE LETTER (OUTPATIENT)
Age: 34
End: 2024-11-24

## 2024-12-20 ENCOUNTER — HOSPITAL ENCOUNTER (EMERGENCY)
Facility: HOSPITAL | Age: 34
Discharge: HOME | End: 2024-12-20
Attending: EMERGENCY MEDICINE | Admitting: EMERGENCY MEDICINE
Payer: COMMERCIAL

## 2024-12-20 ENCOUNTER — APPOINTMENT (OUTPATIENT)
Dept: ULTRASOUND IMAGING | Facility: HOSPITAL | Age: 34
End: 2024-12-20
Attending: EMERGENCY MEDICINE
Payer: COMMERCIAL

## 2024-12-20 VITALS
RESPIRATION RATE: 18 BRPM | SYSTOLIC BLOOD PRESSURE: 121 MMHG | OXYGEN SATURATION: 100 % | HEART RATE: 83 BPM | BODY MASS INDEX: 34.51 KG/M2 | HEIGHT: 71 IN | TEMPERATURE: 98 F | DIASTOLIC BLOOD PRESSURE: 73 MMHG | WEIGHT: 246.5 LBS

## 2024-12-20 DIAGNOSIS — Z3A.08 8 WEEKS GESTATION OF PREGNANCY: ICD-10-CM
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
## 2024-12-20 DIAGNOSIS — R10.2 PELVIC PAIN IN FEMALE: ICD-10-CM

## 2024-12-20 PROCEDURE — 76801 OB US < 14 WKS SINGLE FETUS: CPT

## 2024-12-20 PROCEDURE — 99284 EMERGENCY DEPT VISIT MOD MDM: CPT | Mod: 25

## 2024-12-20 ASSESSMENT — ACTIVITIES OF DAILY LIVING (ADL)
ADLS_ACUITY_SCORE: 48

## 2024-12-20 ASSESSMENT — COLUMBIA-SUICIDE SEVERITY RATING SCALE - C-SSRS
1. IN THE PAST MONTH, HAVE YOU WISHED YOU WERE DEAD OR WISHED YOU COULD GO TO SLEEP AND NOT WAKE UP?: NO
2. HAVE YOU ACTUALLY HAD ANY THOUGHTS OF KILLING YOURSELF IN THE PAST MONTH?: NO
6. HAVE YOU EVER DONE ANYTHING, STARTED TO DO ANYTHING, OR PREPARED TO DO ANYTHING TO END YOUR LIFE?: NO

## 2024-12-20 NOTE — DISCHARGE INSTRUCTIONS
You were seen in the Emergency Department today for evaluation of some pelvic pain after a fall.  Your imaging studies showed no significant cause of your symptoms. Follow up with your primary care physician to ensure resolution of symptoms. Return if you have new or worsening symptoms.

## 2024-12-20 NOTE — ED PROVIDER NOTES
EMERGENCY DEPARTMENT ENCOUNTER      NAME: Amada Melo  AGE: 34 year old female  YOB: 1990  MRN: [id number]  EVALUATION DATE & TIME: 12/20/2024  2:16 PM    PCP: Gretchen Orozco    ED PROVIDER: Ayesha Browning M.D.      Chief Complaint   Patient presents with    Abdominal Pain     FINAL IMPRESSION:  1. Pelvic pain in female    2. 8 weeks gestation of pregnancy    3. Fall, initial encounter        ED COURSE & MEDICAL DECISION MAKING:    Pertinent Labs & Imaging studies reviewed. (See chart for details)  ED Course as of 12/20/24 2211   Fri Dec 20, 2024   1542 Patient is a very pleasant 34-year-old female comes in today for evaluation of some lower abdominal cramping.  She is currently about 8 weeks and 3 days pregnant.  She was holding her 13-month-old child yesterday in a carrier and slipped and landed a little bit on the side of her belly.  She felt like she strained her right side and that feels better now but now she is having some lower abdominal cramping.  She not having any bleeding.  She is currently pregnant and wanted to come in and get checked out.  I have ordered ultrasound of her pregnancy to make sure baby looks okay.  Hopefully she is early enough in the pregnancy that the baby is still pretty well protected.  I discussed this with her and she is in agreement with the plan.   1702 Ultrasound came back unremarkable.  I will discuss with the patient and resume again getting her discharged home.       Medical Decision Making    History:  Supplemental history from: None  External Record(s) reviewed: I reviewed patient's blood type from 5/31/2023 which came back at [facility] positive.    Work Up:  Emergent/Severe conditions considered and evaluated for: Threatened miscarriage, fetal demise  I independently reviewed and interpreted none.   In additional to work up documented, I considered the following work up: None  Medications given that require intensive monitoring for toxicity:  None    External consultation:  Discussion of management with another provider: None    Complicating factors:  Care impacted by chronic illness: Anxiety    Disposition considerations: Discharge  Prescriptions considered/prescribed: None        At the conclusion of the encounter I discussed  the results of all of the tests and the disposition with patient.   All questions were answered.  The patient acknowledged understanding and was involved in the decision making regarding the overall care plan.      I discussed with patient the utility, limitations and findings of the exam/interventions/studies done during this visit as well as the list of differential diagnosis and symptoms to monitor/return to ER for.  Additional verbal discharge instructions were provided.     MEDICATIONS GIVEN IN THE EMERGENCY:  Medications - No data to display    NEW PRESCRIPTIONS STARTED AT TODAY'S ER VISIT  Discharge Medication List as of 12/20/2024  5:16 PM             =================================================================    HPI    Triage Note: Patient arrives with children who are both being seen for rash. From shelter. Fell on ice yesterday, wearing youngest in baby carrier. Fell on knee and right side. Feels like she may have strained herself when she fell trying not to fall on the kids.     Use of : None      Amada RUTHERFORD Kameron is a 34 year old female who presents for evaluation of some lower abdominal cramping after she fell yesterday and hit her belly little bit while holding her child.    PAST MEDICAL HISTORY:  Past Medical History:   Diagnosis Date    ADD (attention deficit disorder)     Genital herpes     Incomplete miscarriage 12/19/2022    Miscarriage 12/19/2022    PTSD (post-traumatic stress disorder)     Superficial thrombophlebitis during pregnancy in first trimester 11/24/2018    Formatting of this note might be different from the original. Consult Dr. Manuel; patient completed 45 days Lovenox.     Uncomplicated asthma        PAST SURGICAL HISTORY:  Past Surgical History:   Procedure Laterality Date    DILATION AND CURETTAGE  2018    DILATION AND CURETTAGE SUCTION N/A 12/18/2022    Procedure: DILATION AND CURETTAGE, UTERUS, USING SUCTION;  Surgeon: Alicia Mota MD;  Location: [facility] OR    ENT SURGERY      PELVIC EXAMINATION UNDER ANESTHESIA N/A 12/18/2022    Procedure: EXAM UNDER ANESTHESIA, GYNECOLOGIC;  Surgeon: Alicia Mota MD;  Location: [facility] OR    TONSILLECTOMY      WISDOM TOOTH EXTRACTION         CURRENT MEDICATIONS:    No current facility-administered medications for this encounter.    Current Outpatient Medications:     acetaminophen (TYLENOL) 500 MG tablet, Take 1-2 tablets (500-1,000 mg) by mouth every 6 hours as needed, Disp: 60 tablet, Rfl: 0    albuterol (PROAIR HFA, PROVENTIL HFA, VENTOLIN HFA) 108 (90 BASE) MCG/ACT inhaler, Inhale 2 puffs into the lungs every 4 hours as needed, Disp: , Rfl:     gabapentin (NEURONTIN) 100 MG capsule, Take 3 capsules (300 mg) by mouth 3 times daily as needed for other (anxiety), Disp: 90 capsule, Rfl: 0    gabapentin (NEURONTIN) 100 MG capsule, Take 1 capsule (100 mg) by mouth 3 times daily as needed (anxiety), Disp: 90 capsule, Rfl: 0    ibuprofen (ADVIL/MOTRIN) 600 MG tablet, Take 1 tablet (600 mg) by mouth every 6 hours as needed, Disp: 40 tablet, Rfl: 0    methylphenidate HCl ER, OSM, (CONCERTA) 18 MG CR tablet, , Disp: , Rfl:     NIFEdipine ER (ADALAT CC) 60 MG 24 hr tablet, Take 1 tablet (60 mg) by mouth daily, Disp: 30 tablet, Rfl: 0    sertraline (ZOLOFT) 50 MG tablet, Take 1 tablet (50 mg) by mouth daily for 30 days, Disp: 30 tablet, Rfl: 0    ALLERGIES:  Allergies   Allergen Reactions    Amoxicillin-Pot Clavulanate Hives    Adhesive Tape      Other reaction(s): Contact Dermatitis  blisters  Chemical burn like reaction to steri strips      Nickel      Skin irritation     Penicillins Hives    Steri Strips       "blister       FAMILY HISTORY:  Family History   Problem Relation Age of Onset    Depression Mother     Depression Father     Asthma Mother     Cervical Cancer Mother     Cerebrovascular Disease Maternal Grandmother     Hypercalcemia Paternal Grandmother     Hypertension Paternal Grandmother        SOCIAL HISTORY:   Social History     Socioeconomic History    Marital status: Single     Spouse name: seperated for 4 years    Number of children: 1   Occupational History    Occupation: unemployed     Employer: [employer]   Tobacco Use    Smoking status: Every Day     Current packs/day: 0.50     Types: Cigarettes    Smokeless tobacco: Never    Tobacco comments:     5 cigs per day   Substance and Sexual Activity    Alcohol use: Not Currently     Comment: Alcoholic Drinks/day: \"sometimes\" - not in pregnancy    Drug use: Not Currently    Sexual activity: Yes     Partners: Male     Social Drivers of Health     Financial Resource Strain: Low Risk  (1/23/2024)    Financial Resource Strain     Within the past 12 months, have you or your family members you live with been unable to get utilities (heat, electricity) when it was really needed?: No   Food Insecurity: High Risk (1/23/2024)    Food Insecurity     Within the past 12 months, did you worry that your food would run out before you got money to buy more?: Yes     Within the past 12 months, did the food you bought just not last and you didn t have money to get more?: Yes   Transportation Needs: High Risk (1/23/2024)    Transportation Needs     Within the past 12 months, has lack of transportation kept you from medical appointments, getting your medicines, non-medical meetings or appointments, work, or from getting things that you need?: Yes   Interpersonal Safety: Low Risk  (11/27/2023)    Interpersonal Safety     Do you feel physically and emotionally safe where you currently live?: Yes     Within the past 12 months, have you been hit, slapped, kicked or otherwise physically " "hurt by someone?: No     Within the past 12 months, have you been humiliated or emotionally abused in other ways by your partner or ex-partner?: No   Housing Stability: Low Risk  (1/23/2024)    Housing Stability     Do you have housing? : Yes     Are you worried about losing your housing?: No       PHYSICAL EXAM    VITAL SIGNS: /73   Pulse 83   Temp 98  F (36.7  C) (Oral)   Resp 18   Ht 1.803 m (5' 11\")   Wt 111.8 kg (246 lb 8 oz)   SpO2 100%   Breastfeeding No   BMI 34.38 kg/m     GENERAL: Awake, alert, answering questions appropriately, no acute distress  SPEECH:  Easy to understand speech, Normal volume and dahiana  PULMONARY: No respiratory distress, Lungs clear to auscultation bilaterally  CARDIOVASCULAR: Regular rate and rhythm, Distal pulses present and normal.  ABDOMINAL: Soft, Nondistended, Nontender, No rebound or guarding, No palpable masses  EXTREMITIES: No lower extremity edema.  PSYCH: Normal mood and affect     LAB:  All pertinent labs reviewed and interpreted.  Results for orders placed or performed during the hospital encounter of 12/20/24   US OB < 14 Weeks Single    Impression    IMPRESSION:     1.  Single intrauterine gestation with cardiac activity at 8 weeks, 0 days, with EDC of 8/1/2025.  2.  No acute abnormalities.         RADIOLOGY:  US OB < 14 Weeks Single   Final Result   IMPRESSION:       1.  Single intrauterine gestation with cardiac activity at 8 weeks, 0 days, with EDC of 8/1/2025.   2.  No acute abnormalities.             Ayesha Browning M.D.  Emergency Medicine  [facility] EMERGENCY DEPARTMENT  [address] [id number] [phone]  Dept: [phone]       Ayesha Browning MD  12/20/24 2211    "

## 2024-12-20 NOTE — ED TRIAGE NOTES
Patient arrives with children who are both being seen for rash. From shelter. Fell on ice yesterday, wearing youngest in baby carrier. Fell on knee and right side. Feels like she may have strained herself when she fell trying not to fall on the kids.

## 2024-12-28 ENCOUNTER — HOSPITAL ENCOUNTER (EMERGENCY)
Facility: HOSPITAL | Age: 34
Discharge: HOME | End: 2024-12-28
Attending: EMERGENCY MEDICINE | Admitting: EMERGENCY MEDICINE
Payer: COMMERCIAL

## 2024-12-28 VITALS
WEIGHT: 247 LBS | RESPIRATION RATE: 18 BRPM | SYSTOLIC BLOOD PRESSURE: 139 MMHG | TEMPERATURE: 97.8 F | OXYGEN SATURATION: 98 % | DIASTOLIC BLOOD PRESSURE: 61 MMHG | HEART RATE: 82 BPM | HEIGHT: 71 IN | BODY MASS INDEX: 34.58 KG/M2

## 2024-12-28 DIAGNOSIS — O21.9 NAUSEA AND VOMITING DURING PREGNANCY: ICD-10-CM

## 2024-12-28 LAB
ALBUMIN SERPL BCG-MCNC: 4.2 G/DL (ref 3.5–5.2)
ALP SERPL-CCNC: 64 U/L (ref 40–150)
ALT SERPL W P-5'-P-CCNC: 12 U/L (ref 0–50)
ANION GAP SERPL CALCULATED.3IONS-SCNC: 9 MMOL/L (ref 7–15)
AST SERPL W P-5'-P-CCNC: 12 U/L (ref 0–45)
BASOPHILS # BLD AUTO: 0 10E3/UL (ref 0–0.2)
BASOPHILS NFR BLD AUTO: 0 %
BILIRUB SERPL-MCNC: <0.2 MG/DL
BUN SERPL-MCNC: 10.6 MG/DL (ref 6–20)
CALCIUM SERPL-MCNC: 9.4 MG/DL (ref 8.8–10.4)
CHLORIDE SERPL-SCNC: 102 MMOL/L (ref 98–107)
CREAT SERPL-MCNC: 0.51 MG/DL (ref 0.51–0.95)
EGFRCR SERPLBLD CKD-EPI 2021: >90 ML/MIN/1.73M2
EOSINOPHIL # BLD AUTO: 0.1 10E3/UL (ref 0–0.7)
EOSINOPHIL NFR BLD AUTO: 2 %
ERYTHROCYTE [DISTWIDTH] IN BLOOD BY AUTOMATED COUNT: 13.6 % (ref 10–15)
GLUCOSE SERPL-MCNC: 74 MG/DL (ref 70–99)
HCO3 SERPL-SCNC: 24 MMOL/L (ref 22–29)
HCT VFR BLD AUTO: 35 % (ref 35–47)
HGB BLD-MCNC: 11.7 G/DL (ref 11.7–15.7)
HOLD SPECIMEN: NORMAL
HOLD SPECIMEN: NORMAL
IMM GRANULOCYTES # BLD: 0 10E3/UL
IMM GRANULOCYTES NFR BLD: 0 %
LIPASE SERPL-CCNC: 16 U/L (ref 13–60)
LYMPHOCYTES # BLD AUTO: 1.5 10E3/UL (ref 0.8–5.3)
LYMPHOCYTES NFR BLD AUTO: 23 %
MCH RBC QN AUTO: 29.5 PG (ref 26.5–33)
MCHC RBC AUTO-ENTMCNC: 33.4 G/DL (ref 31.5–36.5)
MCV RBC AUTO: 88 FL (ref 78–100)
MONOCYTES # BLD AUTO: 0.7 10E3/UL (ref 0–1.3)
MONOCYTES NFR BLD AUTO: 10 %
NEUTROPHILS # BLD AUTO: 4.1 10E3/UL (ref 1.6–8.3)
NEUTROPHILS NFR BLD AUTO: 64 %
NRBC # BLD AUTO: 0 10E3/UL
NRBC BLD AUTO-RTO: 0 /100
PLATELET # BLD AUTO: 186 10E3/UL (ref 150–450)
POTASSIUM SERPL-SCNC: 3.9 MMOL/L (ref 3.4–5.3)
PROT SERPL-MCNC: 6.8 G/DL (ref 6.4–8.3)
RBC # BLD AUTO: 3.97 10E6/UL (ref 3.8–5.2)
SODIUM SERPL-SCNC: 135 MMOL/L (ref 135–145)
WBC # BLD AUTO: 6.3 10E3/UL (ref 4–11)

## 2024-12-28 PROCEDURE — 80048 BASIC METABOLIC PNL TOTAL CA: CPT | Performed by: EMERGENCY MEDICINE

## 2024-12-28 PROCEDURE — 96374 THER/PROPH/DIAG INJ IV PUSH: CPT | Performed by: EMERGENCY MEDICINE

## 2024-12-28 PROCEDURE — 250N000011 HC RX IP 250 OP 636: Performed by: EMERGENCY MEDICINE

## 2024-12-28 PROCEDURE — 96361 HYDRATE IV INFUSION ADD-ON: CPT | Performed by: EMERGENCY MEDICINE

## 2024-12-28 PROCEDURE — 82374 ASSAY BLOOD CARBON DIOXIDE: CPT | Performed by: EMERGENCY MEDICINE

## 2024-12-28 PROCEDURE — 99284 EMERGENCY DEPT VISIT MOD MDM: CPT | Mod: 25 | Performed by: EMERGENCY MEDICINE

## 2024-12-28 PROCEDURE — 258N000003 HC RX IP 258 OP 636: Performed by: EMERGENCY MEDICINE

## 2024-12-28 PROCEDURE — 96375 TX/PRO/DX INJ NEW DRUG ADDON: CPT | Performed by: EMERGENCY MEDICINE

## 2024-12-28 PROCEDURE — 85048 AUTOMATED LEUKOCYTE COUNT: CPT | Performed by: EMERGENCY MEDICINE

## 2024-12-28 PROCEDURE — 36415 COLL VENOUS BLD VENIPUNCTURE: CPT | Performed by: EMERGENCY MEDICINE

## 2024-12-28 PROCEDURE — 85004 AUTOMATED DIFF WBC COUNT: CPT | Performed by: EMERGENCY MEDICINE

## 2024-12-28 PROCEDURE — 83690 ASSAY OF LIPASE: CPT | Performed by: EMERGENCY MEDICINE

## 2024-12-28 RX ORDER — METOCLOPRAMIDE HYDROCHLORIDE 5 MG/ML
10 INJECTION INTRAMUSCULAR; INTRAVENOUS ONCE
Status: COMPLETED | OUTPATIENT
Start: 2024-12-28 | End: 2024-12-28

## 2024-12-28 RX ORDER — ONDANSETRON 2 MG/ML
4 INJECTION INTRAMUSCULAR; INTRAVENOUS EVERY 30 MIN PRN
Status: DISCONTINUED | OUTPATIENT
Start: 2024-12-28 | End: 2024-12-28 | Stop reason: HOSPADM

## 2024-12-28 RX ORDER — ONDANSETRON 4 MG/1
4-8 TABLET, ORALLY DISINTEGRATING ORAL EVERY 8 HOURS PRN
Qty: 20 TABLET | Refills: 0 | Status: SHIPPED | OUTPATIENT
Start: 2024-12-28 | End: 2024-12-31

## 2024-12-28 RX ORDER — DIPHENHYDRAMINE HYDROCHLORIDE 50 MG/ML
25 INJECTION INTRAMUSCULAR; INTRAVENOUS ONCE
Status: DISCONTINUED | OUTPATIENT
Start: 2024-12-28 | End: 2024-12-28

## 2024-12-28 RX ORDER — METOCLOPRAMIDE 10 MG/1
10 TABLET ORAL
Qty: 30 TABLET | Refills: 0 | Status: SHIPPED | OUTPATIENT
Start: 2024-12-28

## 2024-12-28 RX ADMIN — METOCLOPRAMIDE HYDROCHLORIDE 10 MG: 5 INJECTION INTRAMUSCULAR; INTRAVENOUS at 16:00

## 2024-12-28 RX ADMIN — ONDANSETRON 4 MG: 2 INJECTION INTRAMUSCULAR; INTRAVENOUS at 15:51

## 2024-12-28 RX ADMIN — SODIUM CHLORIDE 1000 ML: 9 INJECTION, SOLUTION INTRAVENOUS at 15:45

## 2024-12-28 ASSESSMENT — COLUMBIA-SUICIDE SEVERITY RATING SCALE - C-SSRS
2. HAVE YOU ACTUALLY HAD ANY THOUGHTS OF KILLING YOURSELF IN THE PAST MONTH?: NO
6. HAVE YOU EVER DONE ANYTHING, STARTED TO DO ANYTHING, OR PREPARED TO DO ANYTHING TO END YOUR LIFE?: NO
1. IN THE PAST MONTH, HAVE YOU WISHED YOU WERE DEAD OR WISHED YOU COULD GO TO SLEEP AND NOT WAKE UP?: NO

## 2024-12-28 NOTE — ED PROVIDER NOTES
Emergency Department Encounter     Evaluation Date & Time:   No admission date for patient encounter.    CHIEF COMPLAINT:  Morning Sickness      Triage Note:Is 9 weeks pregnant and for last 4 days has been having extreme nausea and vomiting.              FINAL IMPRESSION:    ICD-10-CM    1. Nausea and vomiting during pregnancy  O21.9           Impression and Plan     ED COURSE & MEDICAL DECISION MAKING:        ED Course as of 12/28/24 1637   Sat Dec 28, 2024   1504 She has already had a confirmatory ultrasound and is not having any abdominal pain so really no ultrasonic imaging is needed today of the pregnancy.  She is more struggling with nausea and vomiting which she has had with a few of her previous pregnancies.  No urinary symptoms to suggest a UTI.  No diarrhea or fever to suggest that this is infectious.  No blood in her emesis to suggest peptic ulcer disease or gastritis.  Will treat symptomatically and do liver function test as well as lipase.  She is only in the early first trimester so no reason to think this is eclampsia/help syndrome.  She otherwise appears well.  She does appear dehydrated.  She has had the discussion with her providers previously about antiemetics during pregnancy.  She specifically initially requested Zofran but an order is already in for Reglan and she is fine with continuing with that with Zofran afterwards as needed.   1634 She feels better and has been eating a snack.  She was offered more fluids but declines would like to discharge and continue management at home.  Patient discharged at her request.       At the conclusion of the encounter I discussed the results of all the tests and the disposition. The questions were answered. The patient or family acknowledged understanding and was agreeable with the care plan.          0 minutes of critical care time        MEDICATIONS GIVEN IN THE EMERGENCY DEPARTMENT:  Medications   ondansetron (ZOFRAN) injection 4 mg (4 mg Intravenous  $Given 24 1551)   sodium chloride 0.9% BOLUS 1,000 mL (1,000 mLs Intravenous $New Bag 24 1545)   metoclopramide (REGLAN) injection 10 mg (10 mg Intravenous $Given 24 1600)       NEW PRESCRIPTIONS STARTED AT TODAY'S ED VISIT:  New Prescriptions    METOCLOPRAMIDE (REGLAN) 10 MG TABLET    Take 1 tablet (10 mg) by mouth 4 times daily (before meals and nightly).    ONDANSETRON (ZOFRAN ODT) 4 MG ODT TAB    Take 1-2 tablets (4-8 mg) by mouth every 8 hours as needed for nausea or vomiting (max daily dose of 4 tablets as needed if too nauseous to take your metoclopramide).       HPI     HPI     Amada Melo is a 34 year old female with a pertinent history of  who presents to this ED with family for evaluation of n/v in early pregnancy.  She took home pregnancy test and had a confirmation ultrasound showing her due tdate is 25, and lmp 10/22/24.  Having significant n/v with this pregnancy starting a week ago, no diarrhea, no fevers.  In communal living situation so can't tolerate the nausea.  Denies abd pain with this.  Feels similar to her n/v in pregnancy previously    Will start at [facility] soon, but no first visit yet.    REVIEW OF SYSTEMS:  Review of Systems  remainder of systems are all otherwise negative.        Medical History     Past Medical History:   Diagnosis Date    ADD (attention deficit disorder)     Genital herpes     Incomplete miscarriage 2022    Miscarriage 2022    PTSD (post-traumatic stress disorder)     Superficial thrombophlebitis during pregnancy in first trimester 2018    Uncomplicated asthma    Asthma exercise induced    Past Surgical History:   Procedure Laterality Date    DILATION AND CURETTAGE      DILATION AND CURETTAGE SUCTION N/A 2022    Procedure: DILATION AND CURETTAGE, UTERUS, USING SUCTION;  Surgeon: Alicia Mota MD;  Location: [facility] OR    ENT SURGERY      PELVIC EXAMINATION UNDER ANESTHESIA N/A  "12/18/2022    Procedure: EXAM UNDER ANESTHESIA, GYNECOLOGIC;  Surgeon: Alicia Mota MD;  Location: [facility] OR    TONSILLECTOMY      WISDOM TOOTH EXTRACTION         Family History   Problem Relation Age of Onset    Depression Mother     Depression Father     Asthma Mother     Cervical Cancer Mother     Cerebrovascular Disease Maternal Grandmother     Hypercalcemia Paternal Grandmother     Hypertension Paternal Grandmother        Social History     Tobacco Use    Smoking status: Every Day     Current packs/day: 0.50     Types: Cigarettes    Smokeless tobacco: Never    Tobacco comments:     5 cigs per day   Substance Use Topics    Alcohol use: Not Currently     Comment: Alcoholic Drinks/day: \"sometimes\" - not in pregnancy    Drug use: Not Currently       acetaminophen (TYLENOL) 500 MG tablet  albuterol (PROAIR HFA, PROVENTIL HFA, VENTOLIN HFA) 108 (90 BASE) MCG/ACT inhaler  gabapentin (NEURONTIN) 100 MG capsule  gabapentin (NEURONTIN) 100 MG capsule  ibuprofen (ADVIL/MOTRIN) 600 MG tablet  methylphenidate HCl ER, OSM, (CONCERTA) 18 MG CR tablet  metoclopramide (REGLAN) 10 MG tablet  NIFEdipine ER (ADALAT CC) 60 MG 24 hr tablet  ondansetron (ZOFRAN ODT) 4 MG ODT tab  sertraline (ZOLOFT) 50 MG tablet        Physical Exam     First Vitals:  Patient Vitals for the past 24 hrs:   BP Temp Temp src Pulse Resp SpO2 Height Weight   12/28/24 1425 (!) 149/85 97.8  F (36.6  C) Oral 91 20 99 % 1.803 m (5' 11\") 112 kg (247 lb)       PHYSICAL EXAM:   Constitutional:   patient ambulatory in waiting room, seen in waiting room evaluation room with her permission due to er at critical capacity   HENT:  Normocephalic, posterior pharynx wnl, mucous membranes dry and dark pink   Eyes:  PERRL, EOMI, Conjunctiva normal, No discharge, no scleral icterus.  Respiratory:  Breathing easily, cta  Cardiovascular:  rrr nl s1s2 0 murmurs, rubs, or gallops.  Peripheral pulses dp, pt, and radial are wnl.  no peripheral edema   GI:  " Bowel sounds normal, Soft, No tenderness, No flank tenderness, nondistended.  :No CVA tenderness.   Musculoskeletal:  Moves all extremities.  No erythematous or swollen major joints,   Integument:  normal color  Neurologic:  Alert & oriented x 3, Normal motor function, Normal sensory function, No focal deficits noted. Normal speech.  Psychiatric:  Affect normal, Judgment normal, Mood normal.     Results     LAB AND RADIOLOGY:  All pertinent labs reviewed and interpreted  Results for orders placed or performed during the hospital encounter of 12/28/24   [place] Draw     Status: None (In process)    Narrative    The following orders were created for panel order [place] Draw.  Procedure                               Abnormality         Status                     ---------                               -----------         ------                     Extra Blue Top Tube[[id number]]                              In process                 Extra Red Top Tube[[id number]]                               In process                 Extra Green Top (Lithium...[[id number]]                                                 Extra Purple Top Tube[[id number]]                                                         Please view results for these tests on the individual orders.   Comprehensive metabolic panel     Status: Normal   Result Value Ref Range    Sodium 135 135 - 145 mmol/L    Potassium 3.9 3.4 - 5.3 mmol/L    Carbon Dioxide (CO2) 24 22 - 29 mmol/L    Anion Gap 9 7 - 15 mmol/L    Urea Nitrogen 10.6 6.0 - 20.0 mg/dL    Creatinine 0.51 0.51 - 0.95 mg/dL    GFR Estimate >90 >60 mL/min/1.73m2    Calcium 9.4 8.8 - 10.4 mg/dL    Chloride 102 98 - 107 mmol/L    Glucose 74 70 - 99 mg/dL    Alkaline Phosphatase 64 40 - 150 U/L    AST 12 0 - 45 U/L    ALT 12 0 - 50 U/L    Protein Total 6.8 6.4 - 8.3 g/dL    Albumin 4.2 3.5 - 5.2 g/dL    Bilirubin Total <0.2 <=1.2 mg/dL   Lipase     Status: Normal   Result Value Ref Range    Lipase 16 13 - 60  U/L   CBC with platelets and differential     Status: None   Result Value Ref Range    WBC Count 6.3 4.0 - 11.0 10e3/uL    RBC Count 3.97 3.80 - 5.20 10e6/uL    Hemoglobin 11.7 11.7 - 15.7 g/dL    Hematocrit 35.0 35.0 - 47.0 %    MCV 88 78 - 100 fL    MCH 29.5 26.5 - 33.0 pg    MCHC 33.4 31.5 - 36.5 g/dL    RDW 13.6 10.0 - 15.0 %    Platelet Count 186 150 - 450 10e3/uL    % Neutrophils 64 %    % Lymphocytes 23 %    % Monocytes 10 %    % Eosinophils 2 %    % Basophils 0 %    % Immature Granulocytes 0 %    NRBCs per 100 WBC 0 <1 /100    Absolute Neutrophils 4.1 1.6 - 8.3 10e3/uL    Absolute Lymphocytes 1.5 0.8 - 5.3 10e3/uL    Absolute Monocytes 0.7 0.0 - 1.3 10e3/uL    Absolute Eosinophils 0.1 0.0 - 0.7 10e3/uL    Absolute Basophils 0.0 0.0 - 0.2 10e3/uL    Absolute Immature Granulocytes 0.0 <=0.4 10e3/uL    Absolute NRBCs 0.0 10e3/uL   CBC with platelets differential     Status: None    Narrative    The following orders were created for panel order CBC with platelets differential.  Procedure                               Abnormality         Status                     ---------                               -----------         ------                     CBC with platelets and d...[[id number]]                      Final result                 Please view results for these tests on the individual orders.           [facility] Documentation     Medical Decision Making  Obtained supplemental history:Supplemental history obtained?: Documented in chart  Reviewed external records: External records reviewed?: Documented in chart  Care impacted by chronic illness:Documented in Chart  Did you consider but not order tests?: Work up considered but not performed and documented in chart, if applicable  Did you interpret images independently?: Independent interpretation of ECG and images noted in documentation, when applicable.  Consultation discussion with other provider:Did you involve another provider (consultant, ,  pharmacy, etc.)?: No  Discharge. I prescribed additional prescription strength medication(s) as charted. See documentation for any additional details.    Elise Yan MD  Emergency Medicine  [facility] EMERGENCY DEPARTMENT         Elise Yan MD  12/28/24 4538

## 2024-12-28 NOTE — DISCHARGE INSTRUCTIONS
Once your nausea is better it would be good to start taking prenatal vitamins if you have not already.  Use the metoclopramide first for your nausea.  If you are too nauseous to take it however I did write for dissolvable Zofran as an option.    Continue with your plan to set up your first visit with the OB/GYN/primary clinic for your pregnancy within the next couple of weeks.    Return to the ER as needed for dehydration and ongoing illness.

## 2024-12-28 NOTE — Clinical Note
Amadabarbara Melo was seen and treated in our emergency department on 12/28/2024.         Sincerely,     [facility] Emergency Department

## 2025-01-14 ENCOUNTER — TELEPHONE (OUTPATIENT)
Dept: MIDWIFE SERVICES | Facility: CLINIC | Age: 35
End: 2025-01-14
Payer: COMMERCIAL

## 2025-01-15 NOTE — TELEPHONE ENCOUNTER
Called and relayed message from Dr. Dominique, patient verbalized understanding and has no further questions at this time.     Hugh Vyas RN  [facility]     
[facility] Call Center    Phone Message    May a detailed message be left on voicemail: yes     Reason for Call: Other: Patient currently LMP: 10/22/24 GA; 12wks. Pt currently scheduled for IOB with [facility]. Pt expressed that she has a health history of high risk pregnancies abnormalities regarding child with cystic hygroma. Pt is only willing to deliver at [facility] and would potentially like to see the Family [facility]/ OB providers at [facility] if possible. Pt would like to confirm before rescheduling with providers if able to be seen with them with this history and if seen with them can they deliver at [facility]. Please review with [facility] providers and contact pt to discuss potential reschedule.       Action Taken: Other: [facility]    Travel Screening: Not Applicable     Date of Service:                                                                      
Per chart review, current pregnancy is complicated by at least the following:  AMA  Hx meth use during last pregnancy (sounds like was using because she was out of ADHD medication)  Hx irregular prenatal care in last pregnancy  Hx preE with severe features requiring  delivery  Pt reports hx cystic hygroma      Given all this I would recommend pt follow with OBGYN such as [facility] as she was seen during last pregnancy. Not appropriate for midwife care given her complex history.    Dr Dominique  
Patient has no objection to blood transfusions.

## 2025-02-24 ENCOUNTER — HOSPITAL ENCOUNTER (EMERGENCY)
Facility: HOSPITAL | Age: 35
Discharge: HOME | End: 2025-02-25
Attending: STUDENT IN AN ORGANIZED HEALTH CARE EDUCATION/TRAINING PROGRAM | Admitting: STUDENT IN AN ORGANIZED HEALTH CARE EDUCATION/TRAINING PROGRAM
Payer: COMMERCIAL

## 2025-02-24 DIAGNOSIS — R10.30 LOWER ABDOMINAL PAIN: ICD-10-CM

## 2025-02-24 LAB
ANION GAP SERPL CALCULATED.3IONS-SCNC: 13 MMOL/L (ref 7–15)
BASOPHILS # BLD AUTO: 0 10E3/UL (ref 0–0.2)
BASOPHILS NFR BLD AUTO: 0 %
BUN SERPL-MCNC: 8.7 MG/DL (ref 6–20)
CALCIUM SERPL-MCNC: 8.9 MG/DL (ref 8.8–10.4)
CHLORIDE SERPL-SCNC: 103 MMOL/L (ref 98–107)
CREAT SERPL-MCNC: 0.52 MG/DL (ref 0.51–0.95)
EGFRCR SERPLBLD CKD-EPI 2021: >90 ML/MIN/1.73M2
EOSINOPHIL # BLD AUTO: 0.1 10E3/UL (ref 0–0.7)
EOSINOPHIL NFR BLD AUTO: 2 %
ERYTHROCYTE [DISTWIDTH] IN BLOOD BY AUTOMATED COUNT: 13.2 % (ref 10–15)
GLUCOSE SERPL-MCNC: 126 MG/DL (ref 70–99)
HCG INTACT+B SERPL-ACNC: ABNORMAL MIU/ML
HCO3 SERPL-SCNC: 19 MMOL/L (ref 22–29)
HCT VFR BLD AUTO: 30.7 % (ref 35–47)
HGB BLD-MCNC: 10.6 G/DL (ref 11.7–15.7)
IMM GRANULOCYTES # BLD: 0 10E3/UL
IMM GRANULOCYTES NFR BLD: 1 %
LYMPHOCYTES # BLD AUTO: 1.8 10E3/UL (ref 0.8–5.3)
LYMPHOCYTES NFR BLD AUTO: 28 %
MCH RBC QN AUTO: 31 PG (ref 26.5–33)
MCHC RBC AUTO-ENTMCNC: 34.5 G/DL (ref 31.5–36.5)
MCV RBC AUTO: 90 FL (ref 78–100)
MONOCYTES # BLD AUTO: 0.6 10E3/UL (ref 0–1.3)
MONOCYTES NFR BLD AUTO: 9 %
NEUTROPHILS # BLD AUTO: 3.9 10E3/UL (ref 1.6–8.3)
NEUTROPHILS NFR BLD AUTO: 61 %
NRBC # BLD AUTO: 0 10E3/UL
NRBC BLD AUTO-RTO: 0 /100
PLATELET # BLD AUTO: 155 10E3/UL (ref 150–450)
POTASSIUM SERPL-SCNC: 3.4 MMOL/L (ref 3.4–5.3)
RBC # BLD AUTO: 3.42 10E6/UL (ref 3.8–5.2)
SODIUM SERPL-SCNC: 135 MMOL/L (ref 135–145)
WBC # BLD AUTO: 6.4 10E3/UL (ref 4–11)

## 2025-02-24 PROCEDURE — 99284 EMERGENCY DEPT VISIT MOD MDM: CPT | Mod: 25

## 2025-02-24 PROCEDURE — 80048 BASIC METABOLIC PNL TOTAL CA: CPT | Performed by: STUDENT IN AN ORGANIZED HEALTH CARE EDUCATION/TRAINING PROGRAM

## 2025-02-24 PROCEDURE — 85025 COMPLETE CBC W/AUTO DIFF WBC: CPT | Performed by: STUDENT IN AN ORGANIZED HEALTH CARE EDUCATION/TRAINING PROGRAM

## 2025-02-24 PROCEDURE — 84702 CHORIONIC GONADOTROPIN TEST: CPT | Performed by: STUDENT IN AN ORGANIZED HEALTH CARE EDUCATION/TRAINING PROGRAM

## 2025-02-24 PROCEDURE — 36415 COLL VENOUS BLD VENIPUNCTURE: CPT | Performed by: STUDENT IN AN ORGANIZED HEALTH CARE EDUCATION/TRAINING PROGRAM

## 2025-02-24 PROCEDURE — 85014 HEMATOCRIT: CPT | Performed by: STUDENT IN AN ORGANIZED HEALTH CARE EDUCATION/TRAINING PROGRAM

## 2025-02-24 RX ORDER — ACETAMINOPHEN 325 MG/1
975 TABLET ORAL ONCE
Status: COMPLETED | OUTPATIENT
Start: 2025-02-25 | End: 2025-02-25

## 2025-02-24 ASSESSMENT — COLUMBIA-SUICIDE SEVERITY RATING SCALE - C-SSRS
1. IN THE PAST MONTH, HAVE YOU WISHED YOU WERE DEAD OR WISHED YOU COULD GO TO SLEEP AND NOT WAKE UP?: NO
6. HAVE YOU EVER DONE ANYTHING, STARTED TO DO ANYTHING, OR PREPARED TO DO ANYTHING TO END YOUR LIFE?: YES
2. HAVE YOU ACTUALLY HAD ANY THOUGHTS OF KILLING YOURSELF IN THE PAST MONTH?: NO

## 2025-02-25 ENCOUNTER — APPOINTMENT (OUTPATIENT)
Dept: ULTRASOUND IMAGING | Facility: HOSPITAL | Age: 35
End: 2025-02-25
Attending: EMERGENCY MEDICINE
Payer: COMMERCIAL

## 2025-02-25 VITALS
SYSTOLIC BLOOD PRESSURE: 142 MMHG | BODY MASS INDEX: 34.58 KG/M2 | OXYGEN SATURATION: 99 % | HEIGHT: 71 IN | DIASTOLIC BLOOD PRESSURE: 92 MMHG | WEIGHT: 247 LBS | TEMPERATURE: 98 F | RESPIRATION RATE: 18 BRPM | HEART RATE: 89 BPM

## 2025-02-25 LAB
ALBUMIN UR-MCNC: 30 MG/DL
APPEARANCE UR: CLEAR
BILIRUB UR QL STRIP: NEGATIVE
COLOR UR AUTO: YELLOW
GLUCOSE UR STRIP-MCNC: NEGATIVE MG/DL
HGB UR QL STRIP: NEGATIVE
KETONES UR STRIP-MCNC: ABNORMAL MG/DL
LEUKOCYTE ESTERASE UR QL STRIP: NEGATIVE
MUCOUS THREADS #/AREA URNS LPF: PRESENT /LPF
NITRATE UR QL: NEGATIVE
PH UR STRIP: 6.5 [PH] (ref 5–7)
RBC URINE: 1 /HPF
SP GR UR STRIP: 1.02 (ref 1–1.03)
SQUAMOUS EPITHELIAL: 3 /HPF
UROBILINOGEN UR STRIP-MCNC: 3 MG/DL
WBC URINE: 2 /HPF

## 2025-02-25 PROCEDURE — 76815 OB US LIMITED FETUS(S): CPT

## 2025-02-25 PROCEDURE — 81001 URINALYSIS AUTO W/SCOPE: CPT | Performed by: STUDENT IN AN ORGANIZED HEALTH CARE EDUCATION/TRAINING PROGRAM

## 2025-02-25 PROCEDURE — 250N000013 HC RX MED GY IP 250 OP 250 PS 637: Performed by: STUDENT IN AN ORGANIZED HEALTH CARE EDUCATION/TRAINING PROGRAM

## 2025-02-25 RX ADMIN — ACETAMINOPHEN 975 MG: 325 TABLET ORAL at 00:38

## 2025-02-25 NOTE — ED TRIAGE NOTES
Rolanda presents to triage by self for abdominal cramping. Pt is 17w6d pregnant. Pt reports that there is no bus that runs to the shelter that they are staying at so she has been walking more than normal, 8 miles yesterday. Pt denies vaginal bleeding but concerned about cramping.     Triage Assessment (Adult)       Row Name 02/24/25 2309          Triage Assessment    Airway WDL WDL        Respiratory WDL    Respiratory WDL WDL

## 2025-02-25 NOTE — ED PROVIDER NOTES
EMERGENCY DEPARTMENT ENCOUNTER      NAME: Amada Melo  AGE: 34 year old female  YOB: 1990  MRN: [id number]  EVALUATION DATE & TIME: No admission date for patient encounter.    PCP: No Ref-Primary, Physician    ED PROVIDER: Carlos Grimm MD      Chief Complaint   Patient presents with    Pregnancy Complications         FINAL IMPRESSION:  1. Lower abdominal pain          ED COURSE & MEDICAL DECISION MAKING:    Pertinent Labs & Imaging studies reviewed. (See chart for details)  34 year old female presents to the Emergency Department for evaluation of pregnancy concern    ED Course as of 02/25/25 0229   Mon Feb 24, 2025 2341 I met with the patient to obtain patient history and performed a physical exam. Discussed plan for ED work up including potential diagnostic studies and interventions.   2345 Patient is a 34-year-old female who presents to the emergency department with lower abdominal cramping pain that has been intermittent for the past 2 days, worsened with movement.  She lives at a shelter with her family, and has been having transportation issues, which has resulted in her walking more, including 8 miles yesterday.  She is 17 weeks and 6 days.  She has no vaginal bleeding or loss of fluid.  Relatively unchanged hemoglobin from prior.  No anemia.  No electrolyte abnormalities or kidney injury.  hCG is 12,757.  Plan to obtain ultrasound to evaluate for abnormalities with the pregnancy.  Given that her pain is across her entire lower abdomen, and intermittent, I have a lower suspicion of appendicitis.  Will also obtain urinalysis to assess for UTI.  Differential also includes musculoskeletal pain.   Tue Feb 25, 2025   0053 Ultrasound shows a single intrauterine gestation with a heartbeat of 144 bpm.  No acute abnormalities.   0228 Urinalysis does not show evidence of infection or hematuria.  Will discharge at this time with return precautions and instructions to follow-up with her  OB/GYN.       Medical Decision Making  Obtained supplemental history:Supplemental history obtained?: No  Reviewed external records: External records reviewed?: Documented in chart  Care impacted by chronic illness:Documented in Chart  Care significantly affected by social determinants of health:Access to Medical Care  Did you consider but not order tests?: Work up considered but not performed and documented in chart, if applicable  Did you interpret images independently?: Independent interpretation of ECG and images noted in documentation, when applicable.  Consultation discussion with other provider:Did you involve another provider (consultant, , pharmacy, etc.)?: No  Discharge. No recommendations on prescription strength medication(s). See documentation for any additional details.  Not Applicable      At the conclusion of the encounter I discussed the results of all of the tests and the disposition. The questions were answered. The patient or family acknowledged understanding and was agreeable with the care plan.     0 minutes of critical care time     MEDICATIONS GIVEN IN THE EMERGENCY:  Medications   acetaminophen (TYLENOL) tablet 975 mg (975 mg Oral $Given 2/25/25 0038)       NEW PRESCRIPTIONS STARTED AT TODAY'S ER VISIT  New Prescriptions    No medications on file          =================================================================    HPI    Patient information was obtained from: Patient    Use of : N/A        Amadabarbara Melo is a 34 year old female with a pertinent history of asthma, ADHD, anxiety, PTSD, superficial thrombophlebitis during pregnancy in first trimester, panic attacks, scoliosis, HTN, and miscarriage who presents to this ED via private car with  for evaluation of pregnancy complications.    Patient is 17-18 weeks pregnant and reports abdominal cramping since yesterday evening. The shelter she has been staying at has no bus and states she has been walking a lot more  than usual. Yesterday, she walked 8 miles. Patient confirms having RSV and influenza currently. She also confirms a headache today. Patient denies abnormal vaginal bleeding, loss of fluid, nausea, vomiting, and pain medication prior to arrival.      PAST MEDICAL HISTORY:  Past Medical History:   Diagnosis Date    ADD (attention deficit disorder)     Genital herpes     Incomplete miscarriage 12/19/2022    Miscarriage 12/19/2022    PTSD (post-traumatic stress disorder)     Superficial thrombophlebitis during pregnancy in first trimester 11/24/2018    Formatting of this note might be different from the original. Consult Dr. Manuel; patient completed 45 days Lovenox.    Uncomplicated asthma        PAST SURGICAL HISTORY:  Past Surgical History:   Procedure Laterality Date    DILATION AND CURETTAGE  2018    DILATION AND CURETTAGE SUCTION N/A 12/18/2022    Procedure: DILATION AND CURETTAGE, UTERUS, USING SUCTION;  Surgeon: Alicia Mota MD;  Location: [facility] OR    ENT SURGERY      PELVIC EXAMINATION UNDER ANESTHESIA N/A 12/18/2022    Procedure: EXAM UNDER ANESTHESIA, GYNECOLOGIC;  Surgeon: Alicia Mota MD;  Location: [facility] OR    TONSILLECTOMY      WISDOM TOOTH EXTRACTION             CURRENT MEDICATIONS:    acetaminophen (TYLENOL) 500 MG tablet  albuterol (PROAIR HFA, PROVENTIL HFA, VENTOLIN HFA) 108 (90 BASE) MCG/ACT inhaler  gabapentin (NEURONTIN) 100 MG capsule  gabapentin (NEURONTIN) 100 MG capsule  ibuprofen (ADVIL/MOTRIN) 600 MG tablet  methylphenidate HCl ER, OSM, (CONCERTA) 18 MG CR tablet  metoclopramide (REGLAN) 10 MG tablet  NIFEdipine ER (ADALAT CC) 60 MG 24 hr tablet  sertraline (ZOLOFT) 50 MG tablet        ALLERGIES:  Allergies   Allergen Reactions    Amoxicillin-Pot Clavulanate Hives    Adhesive Tape      Other reaction(s): Contact Dermatitis  blisters  Chemical burn like reaction to steri strips      Nickel      Skin irritation     Penicillins Hives    Steri Strips   "    blister       FAMILY HISTORY:  Family History   Problem Relation Age of Onset    Depression Mother     Depression Father     Asthma Mother     Cervical Cancer Mother     Cerebrovascular Disease Maternal Grandmother     Hypercalcemia Paternal Grandmother     Hypertension Paternal Grandmother        SOCIAL HISTORY:   Social History     Socioeconomic History    Marital status: Single     Spouse name: seperated for 4 years    Number of children: 1   Occupational History    Occupation: unemployed     Employer: [employer]   Tobacco Use    Smoking status: Every Day     Current packs/day: 0.50     Types: Cigarettes    Smokeless tobacco: Never    Tobacco comments:     5 cigs per day   Substance and Sexual Activity    Alcohol use: Not Currently     Comment: Alcoholic Drinks/day: \"sometimes\" - not in pregnancy    Drug use: Not Currently    Sexual activity: Yes     Partners: Male     Social Drivers of Health     Financial Resource Strain: Low Risk  (1/23/2024)    Financial Resource Strain     Within the past 12 months, have you or your family members you live with been unable to get utilities (heat, electricity) when it was really needed?: No   Food Insecurity: High Risk (1/23/2024)    Food Insecurity     Within the past 12 months, did you worry that your food would run out before you got money to buy more?: Yes     Within the past 12 months, did the food you bought just not last and you didn t have money to get more?: Yes   Transportation Needs: High Risk (1/23/2024)    Transportation Needs     Within the past 12 months, has lack of transportation kept you from medical appointments, getting your medicines, non-medical meetings or appointments, work, or from getting things that you need?: Yes   Interpersonal Safety: Low Risk  (11/27/2023)    Interpersonal Safety     Do you feel physically and emotionally safe where you currently live?: Yes     Within the past 12 months, have you been hit, slapped, kicked or otherwise " "physically hurt by someone?: No     Within the past 12 months, have you been humiliated or emotionally abused in other ways by your partner or ex-partner?: No   Housing Stability: Low Risk  (1/23/2024)    Housing Stability     Do you have housing? : Yes     Are you worried about losing your housing?: No       VITALS:  BP (!) 150/91   Pulse 102   Temp 98  F (36.7  C) (Oral)   Resp 18   Ht 1.803 m (5' 11\")   Wt 112 kg (247 lb)   SpO2 99%   BMI 34.45 kg/m      PHYSICAL EXAM    Physical Exam  Vitals and nursing note reviewed.   Constitutional:       General: She is not in acute distress.     Appearance: Normal appearance. She is normal weight. She is not ill-appearing.   HENT:      Head: Normocephalic and atraumatic.      Nose: Nose normal.      Mouth/Throat:      Mouth: Mucous membranes are moist.      Pharynx: Oropharynx is clear.   Eyes:      Extraocular Movements: Extraocular movements intact.      Conjunctiva/sclera: Conjunctivae normal.   Cardiovascular:      Rate and Rhythm: Normal rate.      Pulses: Normal pulses.   Pulmonary:      Effort: Pulmonary effort is normal.   Abdominal:      General: Abdomen is flat. There is no distension.      Palpations: Abdomen is soft.      Tenderness: There is abdominal tenderness (lower abdomen). There is no guarding or rebound.   Musculoskeletal:         General: Normal range of motion.      Cervical back: Normal range of motion.      Right lower leg: No edema.      Left lower leg: No edema.   Skin:     General: Skin is warm and dry.      Capillary Refill: Capillary refill takes less than 2 seconds.   Neurological:      General: No focal deficit present.      Mental Status: She is alert and oriented to person, place, and time. Mental status is at baseline.   Psychiatric:         Mood and Affect: Mood normal.         Behavior: Behavior normal.         Thought Content: Thought content normal.         Judgment: Judgment normal.            LAB:  All pertinent labs reviewed " and interpreted.  Results for orders placed or performed during the hospital encounter of 02/24/25   US OB >14 Weeks Limited wo Fetal Measurement    Impression    IMPRESSION:  1.  Single intrauterine gestation.  2.  No acute abnormalities.     Basic metabolic panel   Result Value Ref Range    Sodium 135 135 - 145 mmol/L    Potassium 3.4 3.4 - 5.3 mmol/L    Chloride 103 98 - 107 mmol/L    Carbon Dioxide (CO2) 19 (L) 22 - 29 mmol/L    Anion Gap 13 7 - 15 mmol/L    Urea Nitrogen 8.7 6.0 - 20.0 mg/dL    Creatinine 0.52 0.51 - 0.95 mg/dL    GFR Estimate >90 >60 mL/min/1.73m2    Calcium 8.9 8.8 - 10.4 mg/dL    Glucose 126 (H) 70 - 99 mg/dL   hCG Quantitative Pregnancy (blood)   Result Value Ref Range    hCG Quantitative 12,757 (H) <5 mIU/mL   CBC with platelets and differential   Result Value Ref Range    WBC Count 6.4 4.0 - 11.0 10e3/uL    RBC Count 3.42 (L) 3.80 - 5.20 10e6/uL    Hemoglobin 10.6 (L) 11.7 - 15.7 g/dL    Hematocrit 30.7 (L) 35.0 - 47.0 %    MCV 90 78 - 100 fL    MCH 31.0 26.5 - 33.0 pg    MCHC 34.5 31.5 - 36.5 g/dL    RDW 13.2 10.0 - 15.0 %    Platelet Count 155 150 - 450 10e3/uL    % Neutrophils 61 %    % Lymphocytes 28 %    % Monocytes 9 %    % Eosinophils 2 %    % Basophils 0 %    % Immature Granulocytes 1 %    NRBCs per 100 WBC 0 <1 /100    Absolute Neutrophils 3.9 1.6 - 8.3 10e3/uL    Absolute Lymphocytes 1.8 0.8 - 5.3 10e3/uL    Absolute Monocytes 0.6 0.0 - 1.3 10e3/uL    Absolute Eosinophils 0.1 0.0 - 0.7 10e3/uL    Absolute Basophils 0.0 0.0 - 0.2 10e3/uL    Absolute Immature Granulocytes 0.0 <=0.4 10e3/uL    Absolute NRBCs 0.0 10e3/uL   UA with Microscopic reflex to Culture    Specimen: Urine, Clean Catch   Result Value Ref Range    Color Urine Yellow Colorless, Straw, Light Yellow, Yellow    Appearance Urine Clear Clear    Glucose Urine Negative Negative mg/dL    Bilirubin Urine Negative Negative    Ketones Urine Trace (A) Negative mg/dL    Specific Gravity Urine 1.020 1.003 - 1.035    Blood  Urine Negative Negative    pH Urine 6.5 5.0 - 7.0    Protein Albumin Urine 30 (A) Negative mg/dL    Urobilinogen Urine 3.0 (A) <2.0 mg/dL    Nitrite Urine Negative Negative    Leukocyte Esterase Urine Negative Negative    Mucus Urine Present (A) None Seen /LPF    RBC Urine 1 <=2 /HPF    WBC Urine 2 <=5 /HPF    Squamous Epithelials Urine 3 (H) <=1 /HPF       RADIOLOGY:  Reviewed all pertinent imaging. Please see official radiology report.  US OB >14 Weeks Limited wo Fetal Measurement   Final Result   IMPRESSION:   1.  Single intrauterine gestation.   2.  No acute abnormalities.             PROCEDURES:   None      [facility] Documentation:   CMS Diagnoses:              I, Nola Fitzgerald, am serving as a scribe to document services personally performed by Carlos Grimm MD based on my observation and the provider's statements to me. I, Carlos Grimm MD, attest that Nola Fitzgerald is acting in a scribe capacity, has observed my performance of the services and has documented them in accordance with my direction.    Carlos Grimm MD  [facility] EMERGENCY DEPARTMENT  [facility] [facility] [id number] [phone]     Carlos Grimm MD  02/25/25 6938

## 2025-02-25 NOTE — ED NOTES
Patient seen and treated in waiting room by ED provider. RN obtained discharge vital signs, discussed discharge paperwork, answered questions, and discharged patient. Patient leaving department with AVS in hand.

## 2025-03-21 ENCOUNTER — LAB REQUISITION (OUTPATIENT)
Dept: LAB | Facility: CLINIC | Age: 35
End: 2025-03-21
Payer: COMMERCIAL

## 2025-03-21 DIAGNOSIS — Z87.59 PERSONAL HISTORY OF OTHER COMPLICATIONS OF PREGNANCY, CHILDBIRTH AND THE PUERPERIUM: ICD-10-CM

## 2025-03-21 DIAGNOSIS — Z36.89 ENCOUNTER FOR OTHER SPECIFIED ANTENATAL SCREENING: ICD-10-CM

## 2025-03-21 DIAGNOSIS — Z34.90 ENCOUNTER FOR SUPERVISION OF NORMAL PREGNANCY, UNSPECIFIED, UNSPECIFIED TRIMESTER: ICD-10-CM

## 2025-03-21 LAB
ABO + RH BLD: NORMAL
ALBUMIN MFR UR ELPH: 26.4 MG/DL
BASOPHILS # BLD AUTO: 0 10E3/UL (ref 0–0.2)
BASOPHILS NFR BLD AUTO: 0 %
BLD GP AB SCN SERPL QL: NEGATIVE
CREAT UR-MCNC: 172 MG/DL
EOSINOPHIL # BLD AUTO: 0.1 10E3/UL (ref 0–0.7)
EOSINOPHIL NFR BLD AUTO: 1 %
ERYTHROCYTE [DISTWIDTH] IN BLOOD BY AUTOMATED COUNT: 13.1 % (ref 10–15)
EST. AVERAGE GLUCOSE BLD GHB EST-MCNC: 97 MG/DL
HBA1C MFR BLD: 5 %
HCT VFR BLD AUTO: 35.8 % (ref 35–47)
HGB BLD-MCNC: 11.5 G/DL (ref 11.7–15.7)
IMM GRANULOCYTES # BLD: 0 10E3/UL
IMM GRANULOCYTES NFR BLD: 1 %
LYMPHOCYTES # BLD AUTO: 1.5 10E3/UL (ref 0.8–5.3)
LYMPHOCYTES NFR BLD AUTO: 20 %
MCH RBC QN AUTO: 30.9 PG (ref 26.5–33)
MCHC RBC AUTO-ENTMCNC: 32.1 G/DL (ref 31.5–36.5)
MCV RBC AUTO: 96 FL (ref 78–100)
MONOCYTES # BLD AUTO: 0.6 10E3/UL (ref 0–1.3)
MONOCYTES NFR BLD AUTO: 8 %
NEUTROPHILS # BLD AUTO: 5.3 10E3/UL (ref 1.6–8.3)
NEUTROPHILS NFR BLD AUTO: 70 %
NRBC # BLD AUTO: 0 10E3/UL
NRBC BLD AUTO-RTO: 0 /100
PLATELET # BLD AUTO: 161 10E3/UL (ref 150–450)
PROT/CREAT 24H UR: 0.15 MG/MG CR (ref 0–0.2)
RBC # BLD AUTO: 3.72 10E6/UL (ref 3.8–5.2)
SPECIMEN EXP DATE BLD: NORMAL
WBC # BLD AUTO: 7.5 10E3/UL (ref 4–11)

## 2025-03-21 PROCEDURE — 86780 TREPONEMA PALLIDUM: CPT | Performed by: OBSTETRICS & GYNECOLOGY

## 2025-03-21 PROCEDURE — 86762 RUBELLA ANTIBODY: CPT | Performed by: OBSTETRICS & GYNECOLOGY

## 2025-03-21 PROCEDURE — 87389 HIV-1 AG W/HIV-1&-2 AB AG IA: CPT | Performed by: OBSTETRICS & GYNECOLOGY

## 2025-03-21 PROCEDURE — 84156 ASSAY OF PROTEIN URINE: CPT | Performed by: OBSTETRICS & GYNECOLOGY

## 2025-03-21 PROCEDURE — 84550 ASSAY OF BLOOD/URIC ACID: CPT | Performed by: OBSTETRICS & GYNECOLOGY

## 2025-03-21 PROCEDURE — 86803 HEPATITIS C AB TEST: CPT | Performed by: OBSTETRICS & GYNECOLOGY

## 2025-03-21 PROCEDURE — 83036 HEMOGLOBIN GLYCOSYLATED A1C: CPT | Performed by: OBSTETRICS & GYNECOLOGY

## 2025-03-21 PROCEDURE — 87340 HEPATITIS B SURFACE AG IA: CPT | Performed by: OBSTETRICS & GYNECOLOGY

## 2025-03-21 PROCEDURE — 84450 TRANSFERASE (AST) (SGOT): CPT | Performed by: OBSTETRICS & GYNECOLOGY

## 2025-03-21 PROCEDURE — 87086 URINE CULTURE/COLONY COUNT: CPT | Performed by: OBSTETRICS & GYNECOLOGY

## 2025-03-21 PROCEDURE — 82565 ASSAY OF CREATININE: CPT | Performed by: OBSTETRICS & GYNECOLOGY

## 2025-03-21 PROCEDURE — 84460 ALANINE AMINO (ALT) (SGPT): CPT | Performed by: OBSTETRICS & GYNECOLOGY

## 2025-03-21 PROCEDURE — 85025 COMPLETE CBC W/AUTO DIFF WBC: CPT | Performed by: OBSTETRICS & GYNECOLOGY

## 2025-03-21 PROCEDURE — 86850 RBC ANTIBODY SCREEN: CPT | Performed by: OBSTETRICS & GYNECOLOGY

## 2025-03-21 PROCEDURE — 82105 ALPHA-FETOPROTEIN SERUM: CPT | Performed by: OBSTETRICS & GYNECOLOGY

## 2025-03-21 PROCEDURE — 86900 BLOOD TYPING SEROLOGIC ABO: CPT | Performed by: OBSTETRICS & GYNECOLOGY

## 2025-03-22 LAB
ALT SERPL W P-5'-P-CCNC: 12 U/L (ref 0–50)
AST SERPL W P-5'-P-CCNC: 16 U/L (ref 0–45)
CREAT SERPL-MCNC: 0.52 MG/DL (ref 0.51–0.95)
EGFRCR SERPLBLD CKD-EPI 2021: >90 ML/MIN/1.73M2
HBV SURFACE AG SERPL QL IA: NONREACTIVE
HCV AB SERPL QL IA: NONREACTIVE
HIV 1+2 AB+HIV1 P24 AG SERPL QL IA: NONREACTIVE
T PALLIDUM AB SER QL: NONREACTIVE
URATE SERPL-MCNC: 2.8 MG/DL (ref 2.4–5.7)

## 2025-03-23 LAB — BACTERIA UR CULT: NORMAL

## 2025-03-24 ENCOUNTER — PATIENT OUTREACH (OUTPATIENT)
Dept: CARE COORDINATION | Facility: CLINIC | Age: 35
End: 2025-03-24
Payer: COMMERCIAL

## 2025-03-24 LAB
# FETUSES US: NORMAL
AFP MOM SERPL: 0.73
AFP SERPL-MCNC: 39 NG/ML
AGE - REPORTED: 36.7 YR
CURRENT SMOKER: YES
FAMILY MEMBER DISEASES HX: NORMAL
GA METHOD: NORMAL
GA: NORMAL WK
IDDM PATIENT QL: NO
INTEGRATED SCN PATIENT-IMP: NORMAL
RUBV IGG SERPL QL IA: 2.13 INDEX
RUBV IGG SERPL QL IA: POSITIVE
SPECIMEN DRAWN SERPL: NORMAL

## 2025-03-24 ASSESSMENT — ACTIVITIES OF DAILY LIVING (ADL): DEPENDENT_IADLS:: TRANSPORTATION

## 2025-03-24 NOTE — PROGRESS NOTES
"Clinic Care Coordination Contact    Referral Information: Patient is pregnant and living at [facility] but was told she needs to leave with her kids by the 31st. She needs help with housing and resources. [facility] care coordination referral.     Psychosocial assessment:    Patient is 34 years old. She has [place] (14), Spencer (6) and Raul (16 months) and she is currently pregnant. She says that she has no car and no income currently. [place] and [place] because the county \"called the wrong # and couldn't get a hold of her\" so she is reapplying. She does not have a vehicle/transportation. She previously had her own apartment for a long time and then moved in with her mother to help care for her grandma but then when caregiving stopped her mom took the house without warning and she had to leave. She says that she does not have a support system and the fob is not really involved at this time.   Patient says that they are currently at [facility] and were supposed to be able to stay till June but her son Gaurang has autism and they have had multiple police calls to [facility] due to him getting aggressive. He is mad about being at the shelter and also she has a hard time getting him to go to school or leave to go to appts etc. Additionally, she shares that when one of her youngest had to go to the ED for asthma related concerns Gaurang didn't want to go and spent time alone at the shelter unsupervised and the program didn't like that. Mom said that she tried to have a friend go be with him but their car wouldn't start.   [facility] made it so Gaurang is now [facility]. He had to get special approval to be in there as typically they only approve for 16+. Mom tried to get it so they could stay at [facility] but mom is being told she has not followed the rules there and due to multiple police calls to the shelter they need to leave.  She is getting help for Gaurang. He is on " a wait list to get a DD waiver and [facility] crisis stabilization services is following.    She has a [facility] coordinated entry housing worker but she is being told that all the family shelters are full right now. She was told to call every Monday to see what is available. She plans to see if she can get a voucher for a hotel if there is no shelter options that come up. She is on a wait list for a section 8 voucher.    Patient says she may need help with diapers, baby items. She has stuff in a storage unit but she lost the keys so needs to get that figured out. She will need a carseat for the current pregnancy and her 16 month old will need a toddler seat. [facility].     [facility] did bring up maternal crisis homes and will email those options as well.       The patient consented via Verbal consent to have contact information and resources sent via email in an unencrypted manner.     Care Plan: Housing Instability       Problem: SDOH LACK OF STABLE HOUSING       Long-Range Goal: Establish Stable Housing       Start Date: 3/24/2025 Expected End Date: 9/24/2025    This Visit's Progress: 30%    Priority: High    Note:     Barriers: wait lists  Strengths: motivated  Patient expressed understanding of goal: yes  Action steps to achieve this goal:  1. I will work with [facility] entry  2. I will work with housing worker  3. Wait list for section 8 voucher                                    Plan:   -Continue to work with [facility] entry  -[facility] will send housing resources  -[facility] referral placed for car seat/s  -Continue working with [facility] crisis stabilization  -[facility] will follow up within 1 week    CHARLENE Slade, [facility]  Clinic Care Coordinator  [facility]  [phone]

## 2025-03-24 NOTE — LETTER
"Clinic Care Coordination Contact    Referral Information: Patient is pregnant and living at [facility] but was told she needs to leave with her kids by the 31st. She needs help with housing and resources. [facility] placed care coordination referral.     Psychosocial assessment:    Patient is 34 years old. She has [place] (14), Spencer (6) and Raul (16 months) and she is currently pregnant. She says that she has no car and no income currently. She applied for [place] and was denied because the county \"called the wrong # and couldn't get a hold of her\" so she is reapplying. She does not have a vehicle/transportation. She previously had her own apartment for a long time and then moved in with her mother to help care for her grandma but then when caregiving stopped her mom took the house without warning and she had to leave. She says that she does not have a support system and the fob is not really involved at this time.   Patient says that they are currently at [facility] and were supposed to be able to stay till June but her son Gaurang has autism and they have had multiple police calls to [facility] due to him getting aggressive. He is mad about being at the shelter and also she has a hard time getting him to go to school or leave to go to appts etc. Additionally, she shares that when one of her youngest had to go to the ED for asthma related concerns Gaurang didn't want to go and spent time alone at the shelter unsupervised and the program didn't like that. Mom said that she tried to have a friend go be with him but their car wouldn't start.   [facility] made it so Gaurang is now upstairs in the [facility]. He had to get special approval to be in there as typically they only approve for 16+. Mom tried to get it so they could stay at [facility] but mom is being told she has not followed the rules there and due to multiple police calls to the shelter they need to leave.  She is getting help for Gaurang. He is on " a wait list to get a DD waiver and [facility] crisis stabilization services is following.    She has a [facility] coordinated entry housing worker but she is being told that all the family shelters are full right now. She was told to call every Monday to see what is available. She plans to see if she can get a voucher for a hotel if there is no shelter options that come up. She is on a wait list for a section 8 voucher.    Patient says she may need help with diapers, baby items. She has stuff in a storage unit but she lost the keys so needs to get that figured out. She will need a carseat for the current pregnancy and her 16 month old will need a toddler seat. [facility]     [facility] maternal crisis homes and will email those options as well.       The patient consented via Verbal consent to have contact information and resources sent via email in an unencrypted manner.     Care Plan: Housing Instability       Problem: SDOH LACK OF STABLE HOUSING       Long-Range Goal: Establish Stable Housing       Start Date: 3/24/2025 Expected End Date: 9/24/2025    This Visit's Progress: 30%    Priority: High    Note:     Barriers: wait lists  Strengths: motivated  Patient expressed understanding of goal: yes  Action steps to achieve this goal:  1. I will work with [facility] entry  2. I will work with housing worker  3. Wait list for section 8 voucher                                    Plan:   -Continue to work with [facility] entry  -[facility] will [facility] resources  -[facility] referral placed for car seat/s  -Continue working with [facility] crisis stabilization  -[facility] will follow up within 1 week    CHARLENE Slade, [facility]  Clinic Care Coordinator  [facility]  [phone]

## 2025-03-24 NOTE — LETTER
Patient Centered Plan of Care  About Me:        Patient Name:  Amada Melo    YOB: 1990  Age:         34 year old   Chava MRN:    [id number] Telephone Information:  Home Phone Not on file.   Mobile [phone]       Address:  [address] [place] [address]  [place] [address] Email address:  [email or web address]      Emergency Contact(s)    Name Relationship Lgl Grd Work Phone Home Phone Mobile Phone   1. YOLI MELO Father    [phone]   2. YESI Friend    [phone]           Primary language:  English     needed? No   [place] [employer]:  [phone] op. 1  Other communication barriers:No data recorded  Preferred Method of Communication:  Mail  Current living arrangement: No data recorded  Mobility Status/ Medical Equipment: No data recorded      Health Maintenance  Health Maintenance Reviewed: No data recorded    My Access Plan  Medical Emergency 911   Primary Clinic Line  -    24 Hour Appointment Line [phone] or  [phone]) (toll-free)   24 Hour Nurse Line [phone] (toll-free)   Preferred Urgent Care No data recorded   Preferred Hospital No data recorded   [id number] [place] [id number] AT [facility] [address]     Behavioral Health Crisis Line The National Suicide Prevention Lifeline at 1-561.486.9940 or Text/Call 618           My Care Team Members  Patient Care Team         Relationship Specialty Notifications Start End    No Ref-Primary, Physician PCP - General   12/20/24     Fax: [phone]         Chelsey Candelario MD Assigned PCP   12/9/23     Phone: [phone] Fax: [phone] [address] [facility] [address]    Kenia Cooper CNM Midwife Midwives  1/14/25     Phone: [phone] Fax: [phone]         [address] [facility] [address]    Dodie [facility] Coordinator  Admissions 3/24/25                  My Care Plans  Self Management and Treatment Plan    Care Plan  Care Plan: Housing Instability       Problem: SDOH LACK OF STABLE HOUSING       Long-Range Goal: Establish Stable Housing       Start Date: 3/24/2025 Expected End Date: 9/24/2025    This Visit's Progress: 30%    Priority: High    Note:     Barriers: wait lists  Strengths: motivated  Patient expressed understanding of goal: yes  Action steps to achieve this goal:  1. I will work with [facility] coordinated entry  2. I will work with housing worker  3. Wait list for section 8 voucher                               Action Plans on File:                       Advance Care Plans/Directives:             My Medical and Care Information  Problem List   Patient Active Problem List   Diagnosis    Low back pain with right-sided sciatica    PTSD (post-traumatic stress disorder)    Attention deficit hyperactivity disorder (ADHD)    Scoliosis    Anxiety    Genital herpes    Panic attacks    Papanicolaou smear of cervix with atypical squamous cells of undetermined significance (ASC-US)    Supervision of high-risk pregnancy    MIGUEL (generalized anxiety disorder)    Encounter for triage in pregnant patient    Severe pre-eclampsia    Postpartum hypertension    Single liveborn infant delivered vaginally    Preeclampsia, severe    Atypical chest pain      Current Medications:     Care Coordination Start Date: 3/24/2025   Frequency of Care Coordination: No data recorded   Form Last Updated: 03/24/2025

## 2025-04-24 ENCOUNTER — PATIENT OUTREACH (OUTPATIENT)
Dept: CARE COORDINATION | Facility: CLINIC | Age: 35
End: 2025-04-24
Payer: COMMERCIAL
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
## 2025-05-04 ENCOUNTER — HOSPITAL ENCOUNTER (OUTPATIENT)
Facility: HOSPITAL | Age: 35
Discharge: HOME | End: 2025-05-04
Attending: OBSTETRICS & GYNECOLOGY | Admitting: OBSTETRICS & GYNECOLOGY
Payer: COMMERCIAL

## 2025-05-04 ENCOUNTER — HOSPITAL ENCOUNTER (EMERGENCY)
Facility: HOSPITAL | Age: 35
End: 2025-05-04
Payer: COMMERCIAL

## 2025-05-04 ENCOUNTER — HOSPITAL ENCOUNTER (OUTPATIENT)
Facility: HOSPITAL | Age: 35
End: 2025-05-04
Admitting: OBSTETRICS & GYNECOLOGY
Payer: COMMERCIAL

## 2025-05-04 VITALS
HEIGHT: 71 IN | OXYGEN SATURATION: 97 % | WEIGHT: 250 LBS | HEART RATE: 80 BPM | TEMPERATURE: 97.8 F | SYSTOLIC BLOOD PRESSURE: 126 MMHG | DIASTOLIC BLOOD PRESSURE: 68 MMHG | RESPIRATION RATE: 18 BRPM | BODY MASS INDEX: 35 KG/M2

## 2025-05-04 PROBLEM — Z36.89 ENCOUNTER FOR TRIAGE IN PREGNANT PATIENT: Status: ACTIVE | Noted: 2023-11-13

## 2025-05-04 PROCEDURE — 250N000013 HC RX MED GY IP 250 OP 250 PS 637: Performed by: OBSTETRICS & GYNECOLOGY

## 2025-05-04 PROCEDURE — 258N000003 HC RX IP 258 OP 636: Performed by: OBSTETRICS & GYNECOLOGY

## 2025-05-04 PROCEDURE — G0463 HOSPITAL OUTPT CLINIC VISIT: HCPCS

## 2025-05-04 RX ORDER — LIDOCAINE 40 MG/G
CREAM TOPICAL
Status: DISCONTINUED | OUTPATIENT
Start: 2025-05-04 | End: 2025-05-04 | Stop reason: HOSPADM

## 2025-05-04 RX ORDER — GABAPENTIN 100 MG/1
100 CAPSULE ORAL PRN
COMMUNITY

## 2025-05-04 RX ORDER — ACETAMINOPHEN 325 MG/1
975 TABLET ORAL ONCE
Status: COMPLETED | OUTPATIENT
Start: 2025-05-04 | End: 2025-05-04

## 2025-05-04 RX ADMIN — ACETAMINOPHEN 975 MG: 325 TABLET ORAL at 16:11

## 2025-05-04 RX ADMIN — SODIUM CHLORIDE, SODIUM LACTATE, POTASSIUM CHLORIDE, AND CALCIUM CHLORIDE 1000 ML: .6; .31; .03; .02 INJECTION, SOLUTION INTRAVENOUS at 16:11

## 2025-05-04 ASSESSMENT — ACTIVITIES OF DAILY LIVING (ADL)
ADLS_ACUITY_SCORE: 28
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
## 2025-05-04 NOTE — PROGRESS NOTES
Data: Patient assessed in the Birthplace for  headache and muscle soreness and dehydration after physical exertion . Cervical exam deferred. Membranes intact. Contractions are not present. See flowsheets for fetal assessment documentation.     Action: Presumed adequate fetal oxygenation documented. Discharge instructions reviewed. Patient instructed to report change in fetal movement, vaginal leaking of fluid or bleeding, abdominal pain, or any concerns related to the pregnancy to provider/clinic.  Patient received IV hydration and Tylenol for headache.  She still has moving to be completed yet today.      Response: Orders to discharge home per Dr Hairston. Patient verbalized understanding of education and agreement with plan. Discharged to home at 1755.
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
## 2025-05-29 ENCOUNTER — PATIENT OUTREACH (OUTPATIENT)
Dept: CARE COORDINATION | Facility: CLINIC | Age: 35
End: 2025-05-29
Payer: COMMERCIAL

## 2025-05-29 NOTE — PROGRESS NOTES
Clinic Care Coordination Contact  [facility]/Voicemail    Clinical Data: Care Coordinator Outreach    Outreach Documentation Number of Outreach Attempt   5/29/2025   3:02 PM 1       Left message on patient's voicemail with call back information and requested return call.    Plan from last outreach:  -[facility]  -Applied for [facility] apartments in [place], waiting to hear about approval  -She is having trouble with the storage unit company-- she is negotiating with them to see if she can still keep her stuff there until she moves  -She received toddler car seat from [email or web address] and they will deliver new baby car seat once she gets to 7th month of pregnancy  -Once she gets settled [facility] can see if she needs any other baby items/resources      Plan: Care Coordinator can try to reach out again within 1 month    CHARLENE Slade, [facility]  Clinic Care Coordinator  [facility]  [phone]

## 2025-06-05 ENCOUNTER — HOSPITAL ENCOUNTER (OUTPATIENT)
Facility: HOSPITAL | Age: 35
Discharge: HOME | End: 2025-06-05
Attending: OBSTETRICS & GYNECOLOGY | Admitting: OBSTETRICS & GYNECOLOGY
Payer: COMMERCIAL

## 2025-06-05 ENCOUNTER — HOSPITAL ENCOUNTER (EMERGENCY)
Facility: HOSPITAL | Age: 35
End: 2025-06-05
Payer: COMMERCIAL

## 2025-06-05 PROCEDURE — 999N000104 HC STATISTIC NO CHARGE

## 2025-06-05 RX ORDER — LIDOCAINE 40 MG/G
CREAM TOPICAL
Status: DISCONTINUED | OUTPATIENT
Start: 2025-06-05 | End: 2025-06-05 | Stop reason: HOSPADM

## 2025-06-05 ASSESSMENT — ACTIVITIES OF DAILY LIVING (ADL)
ADLS_ACUITY_SCORE: 48

## 2025-06-05 NOTE — PROGRESS NOTES
Pt presents to [facility] via ER with complaints of cough, SOB. Denies any labor/OB symptoms. Pt is a  32.2 weeks. Monitors applied, vss, afebrile.    Informed Dr. Mota of pt status, after a 20 minute strip that is appropriate for gestational age send to ER for evaluation.

## 2025-06-06 NOTE — ED NOTES
Patient brought down from OB. Patient not triaged in the ER. Writer called patient and she is no longer in the ER and no longer wanting to be seen.

## 2025-06-07 ENCOUNTER — HOSPITAL ENCOUNTER (OUTPATIENT)
Facility: HOSPITAL | Age: 35
Discharge: HOME | End: 2025-06-07
Attending: OBSTETRICS & GYNECOLOGY | Admitting: OBSTETRICS & GYNECOLOGY
Payer: COMMERCIAL

## 2025-06-07 VITALS
TEMPERATURE: 98.1 F | DIASTOLIC BLOOD PRESSURE: 74 MMHG | WEIGHT: 256 LBS | OXYGEN SATURATION: 96 % | HEIGHT: 71 IN | RESPIRATION RATE: 18 BRPM | BODY MASS INDEX: 35.84 KG/M2 | SYSTOLIC BLOOD PRESSURE: 133 MMHG

## 2025-06-07 PROCEDURE — G0463 HOSPITAL OUTPT CLINIC VISIT: HCPCS

## 2025-06-07 PROCEDURE — 250N000013 HC RX MED GY IP 250 OP 250 PS 637: Performed by: OBSTETRICS & GYNECOLOGY

## 2025-06-07 RX ORDER — HYDROXYZINE HYDROCHLORIDE 50 MG/1
TABLET, FILM COATED ORAL
Status: DISCONTINUED
Start: 2025-06-07 | End: 2025-06-08 | Stop reason: HOSPADM

## 2025-06-07 RX ORDER — HYDROXYZINE HYDROCHLORIDE 50 MG/1
100 TABLET, FILM COATED ORAL ONCE
Status: COMPLETED | OUTPATIENT
Start: 2025-06-07 | End: 2025-06-07

## 2025-06-07 RX ORDER — CYCLOBENZAPRINE HCL 10 MG
10 TABLET ORAL ONCE
Status: COMPLETED | OUTPATIENT
Start: 2025-06-07 | End: 2025-06-07

## 2025-06-07 RX ORDER — LIDOCAINE 40 MG/G
CREAM TOPICAL
Status: DISCONTINUED | OUTPATIENT
Start: 2025-06-07 | End: 2025-06-08 | Stop reason: HOSPADM

## 2025-06-07 RX ADMIN — HYDROXYZINE HYDROCHLORIDE 100 MG: 50 TABLET ORAL at 22:41

## 2025-06-07 RX ADMIN — CYCLOBENZAPRINE 10 MG: 10 TABLET, FILM COATED ORAL at 22:32

## 2025-06-07 ASSESSMENT — ACTIVITIES OF DAILY LIVING (ADL)
ADLS_ACUITY_SCORE: 25
ADLS_ACUITY_SCORE: 48
ADLS_ACUITY_SCORE: 25
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
## 2025-06-08 NOTE — PROGRESS NOTES
Rolanda discharged home via a Ly[employer] taxi.  Writer provided both verbal and written discharge instructions of which Rolanda verbalized understanding.  Stable, home, undelivered.

## 2025-06-08 NOTE — PROGRESS NOTES
Report received from [facility].  Rafa still at bedside discussing plan of care and discharge plan.  Plan is to give a dose of Flexeril and discharge home. Rolanda feels comfortable with plan of care and verbalizes no other needs currently.

## 2025-06-08 NOTE — PROGRESS NOTES
"Data: Patient presented to Birthplace: 2025  8:51 PM.  Reason for maternal/fetal assessment low back pain. Patient reports that she feels she threw her back out around 1400 today and that it has been \"seizing\" up on her intermittently since then. Patient also reports some pelvic cramping and wanting to make sure she wasn't having contractions. Patient denies uterine contractions, leaking of vaginal fluid/rupture of membranes, vaginal bleeding, abdominal pain, pelvic pressure, nausea, vomiting, headache, visual disturbances, epigastric or RUQ pain, significant edema. Patient was seen in [facility] on  for cough and SOB. She was sent to the ED for evaluation and then decided not to be seen. She states that she has still been coughing vigorously and that also hurts her back. She reports having had herniated discs x2 an has scoliosis. Patient also has asthma and uses and inhaler daily. She denies having COVID, RSV or Flu. Patient reports fetal movement is normal. Patient is a 32w4d .  Prenatal record reviewed. Pregnancy has been complicated by advanced maternal age, BMI and Patient has a history of pre-eclampsia with a previous pregnancy.     Vital signs wnl. Support person is not present.     Action: Verbal consent for EFM. Triage assessment completed.     Response: Patient verbalized agreement with plan. Will contact EMERITA Craig MD with update and further orders.     Lara Hines RN    "

## 2025-06-19 ENCOUNTER — HOSPITAL ENCOUNTER (OUTPATIENT)
Facility: HOSPITAL | Age: 35
Discharge: HOME | End: 2025-06-19
Attending: OBSTETRICS & GYNECOLOGY | Admitting: OBSTETRICS & GYNECOLOGY
Payer: COMMERCIAL

## 2025-06-19 VITALS
SYSTOLIC BLOOD PRESSURE: 131 MMHG | WEIGHT: 257 LBS | DIASTOLIC BLOOD PRESSURE: 80 MMHG | TEMPERATURE: 98.2 F | HEIGHT: 71 IN | HEART RATE: 92 BPM | RESPIRATION RATE: 18 BRPM | BODY MASS INDEX: 35.98 KG/M2

## 2025-06-19 LAB
ALBUMIN UR-MCNC: 10 MG/DL
APPEARANCE UR: CLEAR
BILIRUB UR QL STRIP: NEGATIVE
COLOR UR AUTO: YELLOW
GLUCOSE UR STRIP-MCNC: NEGATIVE MG/DL
HGB UR QL STRIP: NEGATIVE
KETONES UR STRIP-MCNC: NEGATIVE MG/DL
LEUKOCYTE ESTERASE UR QL STRIP: NEGATIVE
MUCOUS THREADS #/AREA URNS LPF: PRESENT /LPF
NITRATE UR QL: NEGATIVE
PH UR STRIP: 6.5 [PH] (ref 5–7)
RBC URINE: <1 /HPF
SP GR UR STRIP: 1.02 (ref 1–1.03)
SQUAMOUS EPITHELIAL: 2 /HPF
UROBILINOGEN UR STRIP-MCNC: NORMAL MG/DL
WBC URINE: <1 /HPF

## 2025-06-19 PROCEDURE — 81001 URINALYSIS AUTO W/SCOPE: CPT | Performed by: OBSTETRICS & GYNECOLOGY

## 2025-06-19 PROCEDURE — G0463 HOSPITAL OUTPT CLINIC VISIT: HCPCS

## 2025-06-19 RX ORDER — LIDOCAINE 40 MG/G
CREAM TOPICAL
Status: DISCONTINUED | OUTPATIENT
Start: 2025-06-19 | End: 2025-06-19 | Stop reason: HOSPADM

## 2025-06-19 ASSESSMENT — ACTIVITIES OF DAILY LIVING (ADL)
ADLS_ACUITY_SCORE: 25
ADLS_ACUITY_SCORE: 25

## 2025-06-19 NOTE — PROGRESS NOTES
"    OB Triage note    Date: 2025    Subjective:   Amada Melo is a 34 year old  at 34w2d presenting for vaginal bleeding after using the bathroom.  She denies cramping or further bleeding, she has not had intercourse.  She states that she has hemorrhoids     O:  /80 (BP Location: Right arm, Patient Position: Semi-Montalvo's, Cuff Size: Adult Regular)   Pulse 92   Temp 98.2  F (36.8  C) (Oral)   Resp 18   Ht 1.803 m (5' 11\")   Wt 116.6 kg (257 lb)   BMI 35.84 kg/m    Gen: Alert, no acute distress  Abd: Gravid, non-tender  Vaginal exam: scant amount of vaginal discharge, no blood, active bleeding or lesions.  Cervix: closed at inspection    NST:  Indication: vaginal bleeding  Results: Reactive    Assessment/Plan:  Amada Melo is a 34 year old  at 34w2d presenting for genital bleeding.  Urine sample sent to look for blood.  It may have been a bleeding hemorrhoid     Amada Light MD  2025 11:09 AM   "

## 2025-06-19 NOTE — DISCHARGE INSTRUCTIONS
Follow-up next week in the clinic.   Call the birthplace if bleeding returns to be evaluated again. [phone].    Learning About When to Call Your Doctor During Pregnancy (After 20 Weeks)  Overview  It's common to have concerns about what might be a problem when you're pregnant. Most pregnancies don't have any serious problems. But it's still important to know when to call your doctor if you have certain symptoms or signs of labor.  These are general suggestions. Your doctor may give you some more information about when to call.  When to call your doctor (after 20 weeks)  Call 911 anytime you think you may need emergency care. For example, call if:  You have severe vaginal bleeding. You have soaked through one or more pads in an hour, and the bleeding is not slowing down.  You have sudden, severe pain in your belly that does not go away.  You have chest pain, are short of breath, or cough up blood.  You passed out (lost consciousness).  You have a seizure.  You see or feel the umbilical cord.  You think you are about to deliver your baby and can't make it safely to the hospital or birthing center.  Call your doctor now or seek immediate medical care if:  You have vaginal bleeding.  You have belly pain.  You have a fever.  You are dizzy or lightheaded, or you feel like you may faint.  You have signs of a blood clot in your leg (called a deep vein thrombosis), such as:  Pain in the calf, back of the knee, thigh, or groin.  Swelling in your leg or groin.  A color change on the leg or groin. The skin may be reddish or purplish.  You have symptoms of preeclampsia, such as:  Sudden swelling of your face, hands, or feet.  New vision problems (such as dimness, blurring, or seeing spots).  A severe headache that will not go away.  You have a sudden release or slow trickle of fluid from your vagina. This may mean your water has broken.  You've been having regular contractions for an hour at less than 37 weeks. This means  "that you've had at least 6 contractions within 1 hour, even after you change your position and drink fluids.  You notice that your baby has stopped moving or is moving less than normal.  You have signs of heart failure, such as:  New or increased shortness of breath.  New or worse swelling in your legs, ankles, or feet.  Sudden weight gain, such as more than 2 to 3 pounds in a day or 5 pounds in a week.  Feeling so tired or weak that you cannot do your usual activities.  You have symptoms of a urinary tract infection. These may include:  Pain or burning when you urinate.  A frequent need to urinate without being able to pass much urine.  Pain in your low back (below the rib cage and above the waist).  Blood in your urine.  Watch closely for changes in your health, and be sure to contact your doctor if:  You have vaginal discharge that smells bad.  You feel sad, anxious, or hopeless for more than a few days.  You have skin changes, such as a rash, itching, or a yellow color to your skin.  You have other concerns about your pregnancy.  If you have labor signs at 37 weeks or more  If you have signs of labor at 37 weeks or more, your doctor may tell you to call when your labor becomes more active. During active labor:  Contractions happen more often and at regular intervals (about every 3 to 5 minutes).  Contractions last longer (about 60 seconds or more).  Contractions get stronger and are hard to talk through.  Follow-up care is a key part of your treatment and safety. Be sure to make and go to all appointments, and call your doctor if you are having problems. It's also a good idea to know your test results and keep a list of the medicines you take.  Where can you learn more?  Go to https://www.healthwise.net/patiented  Enter N531 in the search box to learn more about \"Learning About When to Call Your Doctor During Pregnancy (After 20 Weeks).\"  Current as of: April 30, 2024  Content Version: 14.5 2024-2025 [employer]   Care instructions adapted under license by your healthcare professional. If you have questions about a medical condition or this instruction, always ask your healthcare professional. [facility] [employer] disclaims any warranty or liability for your use of this information.

## 2025-06-19 NOTE — PROGRESS NOTES
Data: Patient assessed in the Birthplace for vaginal bleeding. Cervical exam deferred. Membranes intact. Contractions are not present. See flowsheets for fetal assessment documentation. Urine sample did not show any blood or infection. Patient still not having any contractions or any blood present.     Action: Presumed adequate fetal oxygenation documented. Discharge instructions reviewed. Patient instructed to report change in fetal movement, vaginal leaking of fluid or bleeding, abdominal pain, or any concerns related to the pregnancy to provider/clinic.      Response: Orders to discharge home per Dr. Light. Patient to follow-up in the clinic next week. Discharge instructions reviewed with patient and when she would need to return to the the hospital for evaluation. Patient verbalized understanding of education and agreement with plan. Discharged to home.

## 2025-06-19 NOTE — PROGRESS NOTES
Dr. Light performed a speculum exam, no blood visualized. Plan to collect a urine sample for a UA. When patient went to the bathroom to give her urine sample, she did not noticed any blood when she wiped.

## 2025-06-19 NOTE — PROGRESS NOTES
Data: Patient presented to Birthplace: 2025 10:27 AM.  Reason for maternal/fetal assessment is vaginal bleeding. Patient reports she noticed a small amount of bright jose blood on the toilet paper when she got up to the bathroom this morning. She has not had intercourse in the last 24-48 hours. She denies any changes in vaginal discharge or any symptoms of vaginal infection. Patient denies uterine contractions, leaking of vaginal fluid/rupture of membranes, abdominal pain, pelvic pressure, nausea, vomiting, headache, visual disturbances, epigastric or RUQ pain, significant edema. Patient reports fetal movement is normal. Patient is a 34w2d .  Prenatal record reviewed. Pregnancy has been uncomplicated.    Vital signs wnl. Support person is not present.     Action: Verbal consent for EFM. Triage assessment completed.     Response: Patient verbalized agreement with plan. Will contact Dr. Light with update and further orders.

## 2025-06-30 ENCOUNTER — PATIENT OUTREACH (OUTPATIENT)
Dept: CARE COORDINATION | Facility: CLINIC | Age: 35
End: 2025-06-30
Payer: COMMERCIAL
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
## 2025-06-30 NOTE — LETTER
Patient Centered Plan of Care  About Me:        Patient Name:  Amada Melo    YOB: 1990  Age:         34 year old   Chava MRN:    [id number] Telephone Information:  Home Phone Not on file.   Mobile [phone]       Address:  [address]  [place] [address] Email address:  [email or web address]      Emergency Contact(s)    Name Relationship Lgl Grd Work Phone Home Phone Mobile Phone   1. CHIVO GARCIA Friend   [phone]            Primary language:  English     needed? No   [place] [employer] [facility]:  [phone] op. 1  Other communication barriers:None    Preferred Method of Communication:  Mail  Current living arrangement: Other    Mobility Status/ Medical Equipment: Independent        Health Maintenance  Health Maintenance Reviewed: Not assessed      My Access Plan  Medical Emergency 911   Primary Clinic Line  -    24 Hour Appointment Line [phone] or  [phone]) (toll-free)   24 Hour Nurse Line [phone] (toll-free)   Preferred Urgent Care No data recorded   Preferred Hospital No data recorded   Preferred Pharmacy [id number] - [place], [place] - [address]      Behavioral Health Crisis Line The National Suicide Prevention Lifeline at 1-775.753.9186 or Text/Call 218           My Care Team Members  Patient Care Team         Relationship Specialty Notifications Start End    No Ref-Primary, Physician PCP - General   12/20/24     Fax: [phone]         Chelsey Candelario MD Assigned PCP   12/9/23     Phone: [phone] Fax: [phone] [address] [facility] [address]    Kenia Cooper CNM [facility]  1/14/25     Phone: [phone] Fax: [phone]         [address] [facility] [address] [facility] [address]    Dodie Fairchild LICSW Lead Care Coordinator  Admissions 3/24/25                 My Care Plans  Self Management and Treatment Plan    Care Plan  Care Plan: Housing  Instability       Problem: SDOH LACK OF STABLE HOUSING                     Action Plans on File:                       Advance Care Plans/Directives:             My Medical and Care Information  Problem List   Patient Active Problem List   Diagnosis    Low back pain with right-sided sciatica    PTSD (post-traumatic stress disorder)    Attention deficit hyperactivity disorder (ADHD)    Scoliosis    Anxiety    Genital herpes    Panic attacks    Papanicolaou smear of cervix with atypical squamous cells of undetermined significance (ASC-US)    Supervision of high-risk pregnancy    MIGUEL (generalized anxiety disorder)    Encounter for triage in pregnant patient    Severe pre-eclampsia    Postpartum hypertension    Single liveborn infant delivered vaginally    Preeclampsia, severe    Atypical chest pain      Current Medications:     Care Coordination Start Date: 3/24/2025   Frequency of Care Coordination: monthly, more frequently as needed     Form Last Updated: 06/30/2025

## 2025-06-30 NOTE — PROGRESS NOTES
Clinic Care Coordination Contact  Follow Up Progress Note      Assessment:   Patient discussed barriers to getting to prenatal care being transportation. She does not have a car and she lost her bus card that had $40 on it. She cannot afford Lyft/Uber. We discussed she can use [employer] rides covered to/from clinic and discussed how to schedule. She is interested in setting up an appointment now.     New baby's father is not going to be present at delivery  One of her friends can try and make it if she can find childcare for her own kids  Her 6 year old's dad (diff father) will watch the 6 and 1.5 year old while she delivers the new baby.  She is interested in getting a - [facility] made a referral to [employer] today for a  and breast pump. She says she did not get the car seat for the new baby yet so wants to check on the status of that.    Patient said they moved into their new apartment at [address]. They got into [employer] for furnishing and the kids got twin beds. She does need a crib or pack n play for the new baby as they were not able to provide that.  [facility] gave her the resource for [employer] to [facility] for clothes, diapers, wipes and pack n play. If she's not able to secure a pack n play the hospital can likely help at delivery. Patient says she is also looking on [employer] marketplace.    Patient has not called the [facility] to set up WIC yet but intends to    She plans to call an ambulance to get to the hospital when she goes into labor.    Plan:   -Schedule prenatal appt; plan to schedule ride through [employer]  -Go to [employer] for baby items in [facility] (diapers, wipes, clothes and pack n play)  -Patient/family moved into an apartment in [facility] and furnished through [employer]  -[place] up [place]  -[facility] can outreach again within 1 month    CHARLENE Slade, [facility]  Clinic Care Coordinator  [facility]  [phone]

## 2025-06-30 NOTE — LETTER
[facility]  June 30, 2025    Amada Melo  [address]   [place] [address]      Dear Rolanda,    I am a clinic care coordinator who works at [facility]. I wanted to thank you for spending the time to talk with me.  Below is a description of clinic care coordination and how I can further assist you.       The clinic care coordination team is made up of a registered nurse, , financial resource worker and community health worker who understand the health care system. The goal of clinic care coordination is to help you manage your health and improve access to the health care system. Our team works alongside your provider to assist you in determining your health and social needs. We can help you obtain health care and community resources, providing you with necessary information and education. We can work with you through any barriers and develop a care plan that helps coordinate and strengthen the communication between you and your care team.  Our services are voluntary and are offered without charge to you personally.    Please feel free to contact me with any questions or concerns regarding care coordination and what we can offer.      We are focused on providing you with the highest-quality healthcare experience possible.    Sincerely,     Dodie Oneill, MSW, [facility]  Clinic Care Coordinator

## 2025-06-30 NOTE — LETTER
Patient Centered Plan of Care  About Me:        Patient Name:  Amada Melo    YOB: 1990  Age:         34 year old   Chava MRN:    [id number] Telephone Information:  Home Phone Not on file.   Mobile [phone]       Address:  [address]  [place] [address] Email address:  [email or web address]      Emergency Contact(s)    Name Relationship Lgl Grd Work Phone Home Phone Mobile Phone   1. CHIVO GARCIA Friend   [phone]            Primary language:  English     needed? No   [place] [employer]:  [phone] op. 1  Other communication barriers:None    Preferred Method of Communication:  Mail  Current living arrangement: Other    Mobility Status/ Medical Equipment: Independent        Health Maintenance  Health Maintenance Reviewed: Not assessed      My Access Plan  Medical Emergency 911   Primary Clinic Line  -    24 Hour Appointment Line [phone] or  [phone]) (toll-free)   24 Hour Nurse Line [phone] (toll-free)   Preferred Urgent Care No data recorded   Preferred Hospital No data recorded   Preferred Pharmacy [id number] - [place], [place] - [address]      Behavioral Health Crisis Line The National Suicide Prevention Lifeline at 1-776.693.5361 or Text/Call 178           My Care Team Members  Patient Care Team         Relationship Specialty Notifications Start End    No Ref-Primary, Physician PCP - General   12/20/24     Fax: [phone]         Chelsey Candelario MD Assigned PCP   12/9/23     Phone: [phone] Fax: [phone] [address] [facility] [address]    Kenia Cooper CNM [facility]  1/14/25     Phone: [phone] Fax: [phone]         [address] [facility] [address] [facility] [address]    Dodie Fairchild LICSW Lead Care Coordinator  Admissions 3/24/25                 My Care Plans  Self Management and Treatment Plan    Care Plan  Care Plan: Housing  Instability       Problem: SDOH LACK OF STABLE HOUSING                     Action Plans on File:                       Advance Care Plans/Directives:             My Medical and Care Information  Problem List   Patient Active Problem List   Diagnosis    Low back pain with right-sided sciatica    PTSD (post-traumatic stress disorder)    Attention deficit hyperactivity disorder (ADHD)    Scoliosis    Anxiety    Genital herpes    Panic attacks    Papanicolaou smear of cervix with atypical squamous cells of undetermined significance (ASC-US)    Supervision of high-risk pregnancy    MIGUEL (generalized anxiety disorder)    Encounter for triage in pregnant patient    Severe pre-eclampsia    Postpartum hypertension    Single liveborn infant delivered vaginally    Preeclampsia, severe    Atypical chest pain      Current Medications:     Care Coordination Start Date: 3/24/2025   Frequency of Care Coordination: monthly, more frequently as needed     Form Last Updated: 06/30/2025

## 2025-07-05 ENCOUNTER — HOSPITAL ENCOUNTER (EMERGENCY)
Facility: HOSPITAL | Age: 35
Discharge: HOME | End: 2025-07-06
Attending: EMERGENCY MEDICINE | Admitting: EMERGENCY MEDICINE
Payer: COMMERCIAL

## 2025-07-05 DIAGNOSIS — R06.02 SHORTNESS OF BREATH: ICD-10-CM

## 2025-07-05 DIAGNOSIS — R07.9 CHEST PAIN, UNSPECIFIED TYPE: ICD-10-CM

## 2025-07-05 DIAGNOSIS — K21.9 GASTROESOPHAGEAL REFLUX DISEASE, UNSPECIFIED WHETHER ESOPHAGITIS PRESENT: ICD-10-CM

## 2025-07-05 PROCEDURE — 99285 EMERGENCY DEPT VISIT HI MDM: CPT | Mod: 25

## 2025-07-06 ENCOUNTER — APPOINTMENT (OUTPATIENT)
Dept: ULTRASOUND IMAGING | Facility: HOSPITAL | Age: 35
End: 2025-07-06
Attending: EMERGENCY MEDICINE
Payer: COMMERCIAL

## 2025-07-06 ENCOUNTER — APPOINTMENT (OUTPATIENT)
Dept: CT IMAGING | Facility: HOSPITAL | Age: 35
End: 2025-07-06
Attending: EMERGENCY MEDICINE
Payer: COMMERCIAL

## 2025-07-06 VITALS
BODY MASS INDEX: 36.4 KG/M2 | RESPIRATION RATE: 17 BRPM | DIASTOLIC BLOOD PRESSURE: 77 MMHG | WEIGHT: 261 LBS | SYSTOLIC BLOOD PRESSURE: 136 MMHG | TEMPERATURE: 97.8 F | HEART RATE: 66 BPM | OXYGEN SATURATION: 98 %

## 2025-07-06 LAB
ALBUMIN SERPL BCG-MCNC: 3.6 G/DL (ref 3.5–5.2)
ALP SERPL-CCNC: 111 U/L (ref 40–150)
ALT SERPL W P-5'-P-CCNC: 7 U/L (ref 0–50)
ANION GAP SERPL CALCULATED.3IONS-SCNC: 10 MMOL/L (ref 7–15)
AST SERPL W P-5'-P-CCNC: 10 U/L (ref 0–45)
BILIRUB SERPL-MCNC: 0.2 MG/DL
BUN SERPL-MCNC: 6.1 MG/DL (ref 6–20)
CALCIUM SERPL-MCNC: 9.3 MG/DL (ref 8.8–10.4)
CHLORIDE SERPL-SCNC: 107 MMOL/L (ref 98–107)
CREAT SERPL-MCNC: 0.46 MG/DL (ref 0.51–0.95)
D DIMER PPP FEU-MCNC: 1.19 UG/ML FEU (ref 0–0.5)
EGFRCR SERPLBLD CKD-EPI 2021: >90 ML/MIN/1.73M2
ERYTHROCYTE [DISTWIDTH] IN BLOOD BY AUTOMATED COUNT: 13 % (ref 10–15)
GLUCOSE SERPL-MCNC: 79 MG/DL (ref 70–99)
HCO3 SERPL-SCNC: 20 MMOL/L (ref 22–29)
HCT VFR BLD AUTO: 31.4 % (ref 35–47)
HGB BLD-MCNC: 10.9 G/DL (ref 11.7–15.7)
LIPASE SERPL-CCNC: 43 U/L (ref 13–60)
MCH RBC QN AUTO: 31.5 PG (ref 26.5–33)
MCHC RBC AUTO-ENTMCNC: 34.7 G/DL (ref 31.5–36.5)
MCV RBC AUTO: 91 FL (ref 78–100)
PLATELET # BLD AUTO: 153 10E3/UL (ref 150–450)
POTASSIUM SERPL-SCNC: 3.7 MMOL/L (ref 3.4–5.3)
PROT SERPL-MCNC: 6.4 G/DL (ref 6.4–8.3)
RBC # BLD AUTO: 3.46 10E6/UL (ref 3.8–5.2)
SODIUM SERPL-SCNC: 137 MMOL/L (ref 135–145)
TROPONIN T SERPL HS-MCNC: <6 NG/L
WBC # BLD AUTO: 6.8 10E3/UL (ref 4–11)

## 2025-07-06 PROCEDURE — 85379 FIBRIN DEGRADATION QUANT: CPT | Performed by: EMERGENCY MEDICINE

## 2025-07-06 PROCEDURE — 36415 COLL VENOUS BLD VENIPUNCTURE: CPT | Performed by: EMERGENCY MEDICINE

## 2025-07-06 PROCEDURE — 71275 CT ANGIOGRAPHY CHEST: CPT

## 2025-07-06 PROCEDURE — 93005 ELECTROCARDIOGRAM TRACING: CPT | Performed by: EMERGENCY MEDICINE

## 2025-07-06 PROCEDURE — 250N000011 HC RX IP 250 OP 636: Performed by: EMERGENCY MEDICINE

## 2025-07-06 PROCEDURE — 83690 ASSAY OF LIPASE: CPT | Performed by: EMERGENCY MEDICINE

## 2025-07-06 PROCEDURE — 96374 THER/PROPH/DIAG INJ IV PUSH: CPT | Mod: 59

## 2025-07-06 PROCEDURE — 84484 ASSAY OF TROPONIN QUANT: CPT | Performed by: EMERGENCY MEDICINE

## 2025-07-06 PROCEDURE — 85041 AUTOMATED RBC COUNT: CPT | Performed by: EMERGENCY MEDICINE

## 2025-07-06 PROCEDURE — 96375 TX/PRO/DX INJ NEW DRUG ADDON: CPT | Mod: 59

## 2025-07-06 PROCEDURE — 80053 COMPREHEN METABOLIC PANEL: CPT | Performed by: EMERGENCY MEDICINE

## 2025-07-06 PROCEDURE — 93971 EXTREMITY STUDY: CPT | Mod: RT

## 2025-07-06 PROCEDURE — 96361 HYDRATE IV INFUSION ADD-ON: CPT

## 2025-07-06 PROCEDURE — 258N000003 HC RX IP 258 OP 636: Performed by: EMERGENCY MEDICINE

## 2025-07-06 RX ORDER — ONDANSETRON 2 MG/ML
4 INJECTION INTRAMUSCULAR; INTRAVENOUS ONCE
Status: COMPLETED | OUTPATIENT
Start: 2025-07-06 | End: 2025-07-06

## 2025-07-06 RX ORDER — OMEPRAZOLE 20 MG/1
20 CAPSULE, DELAYED RELEASE ORAL DAILY
Qty: 30 CAPSULE | Refills: 0 | Status: SHIPPED | OUTPATIENT
Start: 2025-07-06 | End: 2025-08-05

## 2025-07-06 RX ORDER — ONDANSETRON 4 MG/1
4 TABLET, ORALLY DISINTEGRATING ORAL EVERY 6 HOURS PRN
Qty: 20 TABLET | Refills: 0 | Status: SHIPPED | OUTPATIENT
Start: 2025-07-06 | End: 2025-07-13

## 2025-07-06 RX ORDER — IOPAMIDOL 755 MG/ML
90 INJECTION, SOLUTION INTRAVASCULAR ONCE
Status: COMPLETED | OUTPATIENT
Start: 2025-07-06 | End: 2025-07-06

## 2025-07-06 RX ADMIN — ONDANSETRON 4 MG: 2 INJECTION, SOLUTION INTRAMUSCULAR; INTRAVENOUS at 00:55

## 2025-07-06 RX ADMIN — SODIUM CHLORIDE 1000 ML: 9 INJECTION, SOLUTION INTRAVENOUS at 02:41

## 2025-07-06 RX ADMIN — IOPAMIDOL 90 ML: 755 INJECTION, SOLUTION INTRAVENOUS at 02:55

## 2025-07-06 RX ADMIN — FAMOTIDINE 20 MG: 10 INJECTION, SOLUTION INTRAVENOUS at 00:55

## 2025-07-06 ASSESSMENT — ACTIVITIES OF DAILY LIVING (ADL)
ADLS_ACUITY_SCORE: 48

## 2025-07-06 ASSESSMENT — COLUMBIA-SUICIDE SEVERITY RATING SCALE - C-SSRS
1. IN THE PAST MONTH, HAVE YOU WISHED YOU WERE DEAD OR WISHED YOU COULD GO TO SLEEP AND NOT WAKE UP?: NO
6. HAVE YOU EVER DONE ANYTHING, STARTED TO DO ANYTHING, OR PREPARED TO DO ANYTHING TO END YOUR LIFE?: NO
2. HAVE YOU ACTUALLY HAD ANY THOUGHTS OF KILLING YOURSELF IN THE PAST MONTH?: NO

## 2025-07-06 NOTE — ED PROVIDER NOTES
EMERGENCY DEPARTMENT ENCOUNTER      NAME: Amada Melo  AGE: 34 year old female  YOB: 1990  EVALUATION DATE & TIME: 2025 11:55 PM    ED PROVIDER: Destinee Barnes MD    Chief Complaint   Patient presents with    Chest Pain    Shortness of Breath    Leg Swelling    Headache    Abdominal Pain    Heartburn       FINAL IMPRESSION  1. Chest pain, unspecified type    2. Shortness of breath    3. Gastroesophageal reflux disease, unspecified whether esophagitis present        MEDICAL DECISION MAKING   Amada Melo is a 34 year old female who presents via EMS with pertinent medical history of current pregnancy (36w5d), nicotine use, and mental health for evaluation of chest pain, shortness of breath, leg swelling, abdominal pain, and headache.     Records reviewed.  Patient is a  at 36w5d following with OB/GYN, most recently seen 2025 with vaginal bleeding after using the bathroom.  I reviewed that note.  Today she presents for evaluation of chest pain, epigastric pain, and right leg swelling.  She reports that her symptoms began earlier this evening without any obvious trigger.  She was told by the paramedics that her blood pressure was elevated but on arrival here, was normotensive with blood pressure of 136/84.  She reports associated shortness of breath and a mild cough, though attributes this to smoking cigarettes.  She has not had any fever, chills, abdominal cramping, vaginal bleeding, loss of fluid, urinary symptoms.  She has struggled with acid reflux but reports that her symptoms today do not seem consistent with what she has had related to reflux.    Vitals on arrival stable and reassuring.  I considered a broad differential COVID not limited to ACS/ischemia, unstable angina, pericarditis, myocarditis, CHF, pleurisy, pulmonary edema, pneumonia, viral illness, GERD, DVT/PE, anemia, electrolyte derangement, gestational hypertension, preeclampsia, anxiety.  Discussed options for  workup and management with the patient.  We have agreed on plan to start with labs, EKG, ultrasound of right lower extremity, management of symptoms with IV fluids, antiemetic, and PPI.    I independently reviewed EKG which showed no evidence of acute ischemic changes.    ED Course as of 07/06/25 0421   Sun Jul 06, 2025   0102 Comprehensive metabolic panel(!)  CMP reassuring. No evidence of JUNE, acidosis, or significant electrolyte derangement. No acute elevation of bilirubin or transaminates to suggest acute hepatobiliary process.   0102 CBC (+ platelets, no diff)(!)  CBC reassuring. No evidence of leukocytosis to suggest systemic infectious/inflammatory process. No acute anemia. PLTs wnl.   0102 Troponin T, High Sensitivity: <6  High sensitivity troponin below age adjusted cutoff. ACS less likely in the setting of ECG without acute ischemic changes and I do not feel delta troponin necessary given timing of symptoms.   0102 Lipase: 43  Lipase within normal limits, symptoms less likely related to acute pancreatitis.   0153 US Lower Extremity Venous Duplex Right  No DVT.    0216 D-Dimer Quantitative(!): 1.19  D-dimer elevated and patient does meet criteria for CT PE study per YEARS criteria.    0342 CT Chest Pulmonary Embolism w Contrast  No evidence of PE, infiltrate, edema, pneumothorax, or other acute process.  Incidentally noted right lower lobe nodule that will require follow-up in the future given patient smokes cigarettes.     Workup was notable for the above. I rechecked the patient multiple times and reviewed results.  Patient did have improvement in symptoms after fluids, PPI, and Zofran.  She felt reassured by results of her workup and was comfortable with plan for discharge and follow-up with OB/GYN as planned.  At this point, etiology of her symptoms is unclear but I see no evidence of acute cardiopulmonary, metabolic, hematologic, or infectious process on workup today.  Will send with a prescription for  omeprazole as well as Zofran and have her follow-up with OB.    At the end of the encounter, we reviewed the results, potential diagnoses, as well as return precautions and recommendations for follow up. I instructed Ms. Melo to return to the emergency department immediately if she develops any new or worsening symptoms and provided additional verbal discharge instructions. Ms. Melo expressed understanding and agreement with this plan of care, her questions were answered, and she was discharged in stable condition.      Additional Considerations in MDM  History:  Supplemental history from: N/A  External Record(s) reviewed: Documented in chart.     Work Up:  Chart documentation includes differential diagnoses considered and any EKGs or imaging independently interpreted as specified above.   In additional to work up documented, I considered additional advanced imaging and laboratory workup but deferred after shared decision making conversation with patient/family    External Consultation(s):  Discussion of management with another provider as documented above and in ED course     Chronic Illness(es):  Care impacted by chronic illness(es): Mental Health and Smoking / Nicotine Use    Disposition considerations: Discharge. I prescribed additional prescription strength medication(s) as charted. I considered admission, but discharged patient after significant clinical improvement.    MIPS: CT Pulmonary Angiogram:The patient had an abnormal d-dimer.       Sepsis/STEMI/Stroke: None        ED COURSE  12:11 AM I met with the patient, obtained history, performed an initial exam, and discussed options and plan for diagnostics and treatment here in the ED.  1:04 AM I checked on the patient.   3:43 AM I updated the patient.      MEDICATIONS GIVEN IN THE ED  Medications   sodium chloride 0.9% BOLUS 1,000 mL (0 mLs Intravenous Stopped 7/6/25 5901)   famotidine (PEPCID) injection 20 mg (20 mg Intravenous $Given 7/6/25 6440)    ondansetron (ZOFRAN) injection 4 mg (4 mg Intravenous $Given 7/6/25 0055)   iopamidol (ISOVUE-370) solution 90 mL (90 mLs Intravenous $Given 7/6/25 0255)       NEW PRESCRIPTIONS STARTED AT TODAY'S VISIT  New Prescriptions    OMEPRAZOLE (PRILOSEC) 20 MG DR CAPSULE    Take 1 capsule (20 mg) by mouth daily.    ONDANSETRON (ZOFRAN ODT) 4 MG ODT TAB    Take 1 tablet (4 mg) by mouth every 6 hours as needed.          =================================================================    HPI:    Use of : N/A      Amada Melo is a 34 year old female who presents via EMS with pertinent medical history of current pregnancy (36w5d), nicotine use, and mental health for evaluation of chest pain, shortness of breath, leg swelling, abdominal pain, and headache.     Patient presents with right leg swelling with a lump that she noticed in her right calf. She further complains of chest pain, shortness of breath, headache, and RUQ abdominal pain which have been intermittent throughout the day. She also notes a cough, however, this is not abnormal for her as she smokes cigarettes. Chest pain and shortness of breath began while she was sitting. Patient also endorses some acid reflux.     Patient denies fever, chills, abdominal cramping, vaginal bleeding, or any other complaints at this time.      RELEVANT HISTORY, MEDICATIONS, & ALLERGIES   Past medical history, surgical history, family history, medications, and allergies reviewed and pertinent noted in HPI.    REVIEW OF SYSTEMS:  A complete review of systems was performed with pertinent positives and negatives noted in the HPI.     PHYSICAL EXAM:    Vitals: /77   Pulse 66   Temp 97.8  F (36.6  C) (Oral)   Resp 17   Wt 118.4 kg (261 lb)   SpO2 98%   BMI 36.40 kg/m     General: Alert and interactive, comfortable appearing.  HENT: Atraumatic. Full AROM of neck. MMM.  Cardiovascular: Regular rate and rhythm.   Chest/Pulmonary: Normal work of breathing. Speaking  in complete sentences. Lungs CTAB. No chest wall tenderness or deformities.  Abdomen: Soft, gravid. Nontender without guarding or rebound.  Extremities: Normal AROM of all major joints. No appreciable edema or TTP of lower extremities.   Skin: Warm and dry. Normal skin color.   Neuro: Speech clear. CNs grossly intact. Moves all extremities spontaneously.   Psych: Normal affect/mood, cooperative, memory appropriate.      LAB  Labs Ordered and Resulted from Time of ED Arrival to Time of ED Departure   COMPREHENSIVE METABOLIC PANEL - Abnormal       Result Value    Sodium 137      Potassium 3.7      Carbon Dioxide (CO2) 20 (*)     Anion Gap 10      Urea Nitrogen 6.1      Creatinine 0.46 (*)     GFR Estimate >90      Calcium 9.3      Chloride 107      Glucose 79      Alkaline Phosphatase 111      AST 10      ALT 7      Protein Total 6.4      Albumin 3.6      Bilirubin Total 0.2     D DIMER QUANTITATIVE - Abnormal    D-Dimer Quantitative 1.19 (*)    CBC WITH PLATELETS - Abnormal    WBC Count 6.8      RBC Count 3.46 (*)     Hemoglobin 10.9 (*)     Hematocrit 31.4 (*)     MCV 91      MCH 31.5      MCHC 34.7      RDW 13.0      Platelet Count 153     LIPASE - Normal    Lipase 43     TROPONIN T, HIGH SENSITIVITY - Normal    Troponin T, High Sensitivity <6         RADIOLOGY  CT Chest Pulmonary Embolism w Contrast   Final Result   IMPRESSION:   1.  There is no pulmonary embolus, aortic aneurysm or dissection. No acute abnormality.   2.  Small indeterminate right lower lobe nodule.      Recommendations for one or multiple incidental lung nodules < 6mm :     Low risk patients: No routine follow-up.     High risk patients: Optional follow-up CT at 12 months; if unchanged, no further follow-up.      *Low Risk: Minimal or absent history of smoking or other known risk factors.   *Nonsolid (ground glass) or partly solid nodules may require longer follow-up to exclude indolent adenocarcinoma.   *Recommendations based on Guidelines for  the Management of Incidental Pulmonary Nodules Detected at CT: From the Fleischner Society 2017, Radiology 2017.         US Lower Extremity Venous Duplex Right   Final Result   IMPRESSION:   1.  No deep venous thrombosis in the right lower extremity.          EKG  Performed at: 0108  Impression: Normal sinus rhythm.  No acute ischemic changes.  Normal intervals.  No significant change from previous.  Rate: 67  Rhythm: Sinus  QRS Interval: 88  QTc Interval: 409  Comparison: 12/6/2023    All laboratory and imaging results and EKG's were personally reviewed and interpreted by myself prior to disposition decision.       I, Oliver Pierce, am serving as a scribe to document services personally performed by Dr. Destinee Barnes based on my observation and the provider's statements to me. I, Destinee Barnes MD attest that Oliver Pierce is acting in a scribe capacity, has observed my performance of the services and has documented them in accordance with my direction.    Destinee Barnes M.D.  Emergency Medicine  [facility] EMERGENCY DEPARTMENT  [facility] [facility] [address]  [phone]  Dept: [phone]     Destinee Barnes MD  07/06/25 0423

## 2025-07-06 NOTE — ED NOTES
Bed: [place] [address]  Expected date: 7/5/25  Expected time: 11:49 PM  Means of arrival: Ambulance  Comments:  Spf- 36f 37weeks pregnant, chest discomfort, vss, 2/10 pain

## 2025-07-06 NOTE — ED NOTES
Multiple complaints. Many symptoms have been ongoing for past few weeks and not present tonight. Tonight, states main concern is swelling of right leg and headache.

## 2025-07-06 NOTE — ED TRIAGE NOTES
Multiple complaints, headache, chest pain, epigastric pain, leg swelling, short of breath. Has not gotten routine prenatal care.     Reports preeclampsia with last pregnancy and required early induction. 37 weeks pregnant;.     Triage Assessment (Adult)       Row Name 07/06/25 0001          Triage Assessment    Airway WDL WDL        Respiratory WDL    Respiratory WDL WDL        Skin Circulation/Temperature WDL    Skin Circulation/Temperature WDL WDL        Cardiac WDL    Cardiac WDL WDL        Peripheral/Neurovascular WDL    Peripheral Neurovascular WDL WDL        Cognitive/Neuro/Behavioral WDL    Cognitive/Neuro/Behavioral WDL WDL

## 2025-07-06 NOTE — DISCHARGE INSTRUCTIONS
You were seen in the emergency department for chest pain and shortness of breath.  Like we talked about, I did not see any problems with your heart or your lungs on the labs or scans today.  It is possible that your symptoms are related to irritation of the lining of your lungs, viral illness, or acid reflux.  I am sending with prescription for Zofran to use as needed for nausea and Prilosec to help with the acid reflux symptoms.    Please return to the Emergency Department immediately if you experience chest pain, difficulty breathing, and/or if your symptoms get worse, do not improve, or with any new concerns. Otherwise, please follow up with your OB/GYN as planned for recheck and ongoing management.    Below is some information that you might find informative and useful.     Thank you for choosing [facility]. It was a pleasure taking care of you today!  -Dr. Destinee Barnes

## 2025-07-07 LAB
ATRIAL RATE - MUSE: 67 BPM
DIASTOLIC BLOOD PRESSURE - MUSE: 83 MMHG
INTERPRETATION ECG - MUSE: NORMAL
P AXIS - MUSE: 61 DEGREES
PR INTERVAL - MUSE: 182 MS
QRS DURATION - MUSE: 88 MS
QT - MUSE: 388 MS
QTC - MUSE: 409 MS
R AXIS - MUSE: 68 DEGREES
SYSTOLIC BLOOD PRESSURE - MUSE: 136 MMHG
T AXIS - MUSE: 52 DEGREES
VENTRICULAR RATE- MUSE: 67 BPM

## 2025-07-11 ENCOUNTER — LAB REQUISITION (OUTPATIENT)
Dept: LAB | Facility: CLINIC | Age: 35
End: 2025-07-11

## 2025-07-11 ENCOUNTER — LAB REQUISITION (OUTPATIENT)
Dept: LAB | Facility: CLINIC | Age: 35
End: 2025-07-11
Payer: COMMERCIAL

## 2025-07-11 DIAGNOSIS — Z36.85 ENCOUNTER FOR ANTENATAL SCREENING FOR STREPTOCOCCUS B: ICD-10-CM

## 2025-07-11 DIAGNOSIS — Z34.90 ENCOUNTER FOR SUPERVISION OF NORMAL PREGNANCY, UNSPECIFIED, UNSPECIFIED TRIMESTER: ICD-10-CM

## 2025-07-11 DIAGNOSIS — Z13.1 ENCOUNTER FOR SCREENING FOR DIABETES MELLITUS: ICD-10-CM

## 2025-07-11 DIAGNOSIS — Z11.3 ENCOUNTER FOR SCREENING FOR INFECTIONS WITH A PREDOMINANTLY SEXUAL MODE OF TRANSMISSION: ICD-10-CM

## 2025-07-11 PROCEDURE — 85027 COMPLETE CBC AUTOMATED: CPT | Performed by: OBSTETRICS & GYNECOLOGY

## 2025-07-11 PROCEDURE — 87653 STREP B DNA AMP PROBE: CPT | Mod: ORL | Performed by: OBSTETRICS & GYNECOLOGY

## 2025-07-11 PROCEDURE — 83036 HEMOGLOBIN GLYCOSYLATED A1C: CPT | Performed by: OBSTETRICS & GYNECOLOGY

## 2025-07-11 PROCEDURE — 86780 TREPONEMA PALLIDUM: CPT | Performed by: OBSTETRICS & GYNECOLOGY

## 2025-07-12 LAB
ERYTHROCYTE [DISTWIDTH] IN BLOOD BY AUTOMATED COUNT: 13.2 % (ref 10–15)
EST. AVERAGE GLUCOSE BLD GHB EST-MCNC: 94 MG/DL
GP B STREP DNA SPEC QL NAA+PROBE: NEGATIVE
HBA1C MFR BLD: 4.9 %
HCT VFR BLD AUTO: 34.7 % (ref 35–47)
HGB BLD-MCNC: 11.7 G/DL (ref 11.7–15.7)
MCH RBC QN AUTO: 31.2 PG (ref 26.5–33)
MCHC RBC AUTO-ENTMCNC: 33.7 G/DL (ref 31.5–36.5)
MCV RBC AUTO: 93 FL (ref 78–100)
PLATELET # BLD AUTO: 172 10E3/UL (ref 150–450)
RBC # BLD AUTO: 3.75 10E6/UL (ref 3.8–5.2)
T PALLIDUM AB SER QL: NONREACTIVE
WBC # BLD AUTO: 7.2 10E3/UL (ref 4–11)

## 2025-07-18 ENCOUNTER — MEDICAL CORRESPONDENCE (OUTPATIENT)
Dept: HEALTH INFORMATION MANAGEMENT | Facility: CLINIC | Age: 35
End: 2025-07-18
Payer: COMMERCIAL

## 2025-07-19 ENCOUNTER — TRANSFERRED RECORDS (OUTPATIENT)
Dept: HEALTH INFORMATION MANAGEMENT | Facility: CLINIC | Age: 35
End: 2025-07-19
Payer: COMMERCIAL

## 2025-07-21 ENCOUNTER — TRANSCRIBE ORDERS (OUTPATIENT)
Dept: OTHER | Age: 35
End: 2025-07-21

## 2025-07-21 ENCOUNTER — PATIENT OUTREACH (OUTPATIENT)
Dept: ONCOLOGY | Facility: CLINIC | Age: 35
End: 2025-07-21
Payer: COMMERCIAL

## 2025-07-21 DIAGNOSIS — R91.8 OTHER NONSPECIFIC ABNORMAL FINDING OF LUNG FIELD: Primary | ICD-10-CM

## 2025-07-21 NOTE — PROGRESS NOTES
New IP (Interventional Pulmonology) referral rec'd.  Chart reviewed.        New Patient: Interventional Pulmonary (Lung nodule) Nurse Navigator Note    Referring provider: Provider: Jessica Moritz   Affiliated with: [facility]   Location:  [facility]    Referred to (specialty): Interventional Pulmonary (Lung nodule)    Requested provider (if applicable): n/a    Date Referral Received: 7/21/2025    Evaluation for:  lung nodules    Clinical History (per Nurse review of records provided):    **BOOK MARKED**    EXAM: CT CHEST PULMONARY EMBOLISM W CONTRAST  LOCATION: [facility]  DATE: 7/6/2025     INDICATION: CP, dyspnea  COMPARISON: None.  TECHNIQUE: CT chest pulmonary angiogram during arterial phase injection of IV contrast. Multiplanar reformats and MIP reconstructions were performed. Dose reduction techniques were used.   CONTRAST: isovue 370 90ml     FINDINGS:  ANGIOGRAM CHEST: Pulmonary arteries are normal caliber and negative for pulmonary emboli. Thoracic aorta is negative for dissection. No CT evidence of right heart strain.     LUNGS AND PLEURA: 2 mm nodule in the right lower lobe laterally. Calcified granulomas in the right middle lobe and left upper lobe. No pneumothorax or pleural effusion.     MEDIASTINUM/AXILLAE: Normal.     CORONARY ARTERY CALCIFICATION: None.     UPPER ABDOMEN: Normal.     MUSCULOSKELETAL: Normal.                                                                      IMPRESSION:  1.  There is no pulmonary embolus, aortic aneurysm or dissection. No acute abnormality.  2.  Small indeterminate right lower lobe nodule.    Records Location: [facility]  & [facility]    RECORDS NEEDED: 12/4/2021:  CXR imaging pushed to PACS from --thank you!!    Additional testing needed prior to consult: none

## 2025-07-22 ENCOUNTER — ANESTHESIA EVENT (OUTPATIENT)
Dept: OBGYN | Facility: HOSPITAL | Age: 35
End: 2025-07-22
Payer: COMMERCIAL

## 2025-07-22 ENCOUNTER — ANESTHESIA (OUTPATIENT)
Dept: OBGYN | Facility: HOSPITAL | Age: 35
End: 2025-07-22
Payer: COMMERCIAL

## 2025-07-22 PROBLEM — Z34.90 ENCOUNTER FOR ELECTIVE INDUCTION OF LABOR: Status: ACTIVE | Noted: 2025-07-22

## 2025-07-22 PROCEDURE — 370N000003 HC ANESTHESIA WARD SERVICE: Performed by: ANESTHESIOLOGY

## 2025-07-22 PROCEDURE — 250N000011 HC RX IP 250 OP 636: Performed by: ANESTHESIOLOGY

## 2025-07-22 RX ADMIN — Medication 12 ML: at 18:31

## 2025-07-22 RX ADMIN — Medication 10 ML: at 17:57

## 2025-07-22 NOTE — ANESTHESIA POSTPROCEDURE EVALUATION
Patient: Amada Melo    Procedure: * No procedures listed *  Induction    Anesthesia Type:  No value filed.    Note:  Disposition: Outpatient   Postop Pain Control: Uneventful            Sign Out: Well controlled pain   PONV: No   Neuro/Psych: Uneventful            Sign Out: Acceptable/Baseline neuro status   Airway/Respiratory: Uneventful            Sign Out: Acceptable/Baseline resp. status   CV/Hemodynamics: Uneventful            Sign Out: Acceptable CV status; No obvious hypovolemia; No obvious fluid overload   Other NRE: NONE   DID A NON-ROUTINE EVENT OCCUR? No           Last vitals:  Vitals:    07/22/25 1804 07/22/25 1808 07/22/25 1811   BP: (!) 145/87 139/83    Resp: 20     Temp:      SpO2: 98% 97% 94%       Electronically Signed By: Zan Byrne MD  July 22, 2025  6:13 PM

## 2025-07-22 NOTE — ANESTHESIA PREPROCEDURE EVALUATION
"Anesthesia Pre-Procedure Evaluation    Patient: Amada Melo   MRN: [id number] : 1990          Procedure : * No procedures listed *  Induction      Past Medical History:   Diagnosis Date    ADD (attention deficit disorder)     Depressive disorder     Genital herpes     Incomplete miscarriage 2022    Miscarriage 2022    PTSD (post-traumatic stress disorder)     Superficial thrombophlebitis during pregnancy in first trimester 2018    Formatting of this note might be different from the original. Consult Dr. Manuel; patient completed 45 days Lovenox.    Uncomplicated asthma       Past Surgical History:   Procedure Laterality Date    DILATION AND CURETTAGE      DILATION AND CURETTAGE SUCTION N/A 2022    Procedure: DILATION AND CURETTAGE, UTERUS, USING SUCTION;  Surgeon: Alicia Mota MD;  Location: [facility] Main OR    ENT SURGERY      PELVIC EXAMINATION UNDER ANESTHESIA N/A 2022    Procedure: EXAM UNDER ANESTHESIA, GYNECOLOGIC;  Surgeon: Alicia Mota MD;  Location: [facility] OR    TONSILLECTOMY      WISDOM TOOTH EXTRACTION        Allergies   Allergen Reactions    Amoxicillin-Pot Clavulanate Hives    Nickel      Skin irritation     Penicillins Hives    Steri Strips      Patient reports a chemical like burn/blister after having steri strips      Social History     Tobacco Use    Smoking status: Every Day     Current packs/day: 0.25     Average packs/day: 0.3 packs/day for 19.6 years (4.9 ttl pk-yrs)     Types: Cigarettes     Start date:     Smokeless tobacco: Never    Tobacco comments:     5 cigs per day   Substance Use Topics    Alcohol use: Not Currently     Comment: Alcoholic Drinks/day: \"sometimes\" - not in pregnancy      Wt Readings from Last 1 Encounters:   25 121.1 kg (267 lb)        Anesthesia Evaluation            ROS/MED HX  ENT/Pulmonary:     (+)                      asthma                  Neurologic:       Cardiovascular: "     (+)  hypertension- -   -  - -                                      METS/Exercise Tolerance:     Hematologic:       Musculoskeletal:       GI/Hepatic:       Renal/Genitourinary:       Endo:     (+)               Obesity,       Psychiatric/Substance Use:       Infectious Disease:       Malignancy:       Other:              Physical Exam  Airway  Cardiovascular - normal exam   Dental     Pulmonary - normal exam      Neurological - normal exam  She appears awake, alert and oriented x3.    Other Findings       OUTSIDE LABS:  CBC:   Lab Results   Component Value Date    WBC 7.5 07/22/2025    WBC 7.2 07/11/2025    HGB 11.6 (L) 07/22/2025    HGB 11.7 07/11/2025    HCT 33.6 (L) 07/22/2025    HCT 34.7 (L) 07/11/2025     07/22/2025     07/11/2025     BMP:   Lab Results   Component Value Date     07/06/2025     02/24/2025    POTASSIUM 3.7 07/06/2025    POTASSIUM 3.4 02/24/2025    CHLORIDE 107 07/06/2025    CHLORIDE 103 02/24/2025    CO2 20 (L) 07/06/2025    CO2 19 (L) 02/24/2025    BUN 6.1 07/06/2025    BUN 8.7 02/24/2025    CR 0.46 (L) 07/06/2025    CR 0.52 03/21/2025    GLC 79 07/06/2025     (H) 02/24/2025     COAGS:   Lab Results   Component Value Date    PTT 29 05/30/2023    INR 1.08 05/30/2023     POC:   Lab Results   Component Value Date    HCGS Positive (A) 12/18/2022     HEPATIC:   Lab Results   Component Value Date    ALBUMIN 3.6 07/06/2025    PROTTOTAL 6.4 07/06/2025    ALT 7 07/06/2025    AST 10 07/06/2025    ALKPHOS 111 07/06/2025    BILITOTAL 0.2 07/06/2025     OTHER:   Lab Results   Component Value Date    A1C 4.9 07/11/2025    CHEKO 9.3 07/06/2025    MAG 2.0 05/31/2023    LIPASE 43 07/06/2025    TSH 1.46 12/19/2022       Anesthesia Plan    ASA Status:  2       Anesthesia Type: Epidural.        Consents    Anesthesia Plan(s) and associated risks, benefits, and realistic alternatives discussed. Questions answered and patient/representative(s) expressed understanding.     -  Discussed:     - Discussed with:  Patient               Postoperative Care         Comments:    Other Comments: Plan for and discussed labor epidural - risks including possible bleeding, infection and headache.               Zan Byrne MD    I have reviewed the pertinent notes and labs in the chart from the past 30 days and (re)examined the patient.  Any updates or changes from those notes are reflected in this note.    Clinically Significant Risk Factors Present on Admission                   # Hypertension: Noted on problem list

## 2025-07-22 NOTE — ANESTHESIA PROCEDURE NOTES
"Epidural catheter Procedure Note    Pre-Procedure   Staff -        Anesthesiologist:  Zan Byrne MD       Performed By: anesthesiologist       Location: OB       Procedure Start/Stop Times: 7/22/2025 5:52 PM and 7/22/2025 6:01 PM       Pre-Anesthestic Checklist: patient identified, IV checked, risks and benefits discussed, informed consent, monitors and equipment checked, pre-op evaluation, at physician/surgeon's request and post-op pain management  Timeout:       Correct Patient: Yes        Correct Procedure: Yes        Correct Site: Yes        Correct Position: Yes   Procedure Documentation  Procedure: epidural catheter         Patient Position: sitting       Skin prep: Betadine and Chloraprep       Insertion Site: L4-5.       Technique: LORT air        KIRBY at 6.5 cm.       Needle Type: ToMyMiniLifey needle       Needle Gauge: 17.        Needle Length (Inches): 3.5        Catheter: 19 G.          Catheter threaded easily.           Threaded 3 cm at skin.         # of attempts: 1 and  # of redirects:  0    Assessment/Narrative         Paresthesias: No.       Test dose of 3 mL lidocaine 1.5% w/ 1:200,000 epinephrine at 17:54 CDT.         Test dose negative, 3 minutes after injection, for signs of intravascular, subdural, or intrathecal injection.       Insertion/Infusion Method: LORT air    Medication(s) Administered   0.125% bupivacaine (Epidural) - EPIDURAL   10 mL - 7/22/2025 5:57:00 PM  Medication Administration Time: 7/22/2025 5:52 PM      FOR [facility] ([facility]) ONLY:   Pain Team Contact information: please page the Pain Team Via [email or web address]. Search \"Pain\". During daytime hours, please page the attending first. At night please page the resident first.      "

## 2025-07-23 PROBLEM — Z87.42 HISTORY OF ABNORMAL CERVICAL PAPANICOLAOU SMEAR: Status: ACTIVE | Noted: 2025-03-20

## 2025-07-23 PROBLEM — Z91.419 HISTORY OF ABUSE IN ADULTHOOD: Status: ACTIVE | Noted: 2025-03-20

## 2025-07-23 PROBLEM — O09.519 ELDERLY PRIMIGRAVIDA: Status: ACTIVE | Noted: 2025-03-20

## 2025-07-23 PROBLEM — I82.890 SUPERFICIAL VEIN THROMBOSIS: Status: ACTIVE | Noted: 2025-03-20

## 2025-07-23 PROBLEM — Z59.82 LACK OF ACCESS TO TRANSPORTATION: Status: ACTIVE | Noted: 2025-03-20

## 2025-07-23 PROBLEM — Z59.01: Status: ACTIVE | Noted: 2025-03-20

## 2025-07-23 PROBLEM — J45.909 ASTHMA: Status: ACTIVE | Noted: 2025-07-23

## 2025-07-23 PROBLEM — Z80.49 FAMILY HISTORY OF MALIGNANT NEOPLASM OF CERVIX UTERI: Status: ACTIVE | Noted: 2025-03-20

## 2025-07-23 PROBLEM — F17.210 CIGARETTE SMOKER: Status: ACTIVE | Noted: 2025-03-20

## 2025-07-28 ENCOUNTER — PATIENT OUTREACH (OUTPATIENT)
Dept: CARE COORDINATION | Facility: CLINIC | Age: 35
End: 2025-07-28
Payer: COMMERCIAL

## 2025-08-12 ENCOUNTER — PATIENT OUTREACH (OUTPATIENT)
Dept: CARE COORDINATION | Facility: CLINIC | Age: 35
End: 2025-08-12
Payer: COMMERCIAL

## 2025-08-20 ENCOUNTER — MEDICAL CORRESPONDENCE (OUTPATIENT)
Dept: HEALTH INFORMATION MANAGEMENT | Facility: CLINIC | Age: 35
End: 2025-08-20
Payer: COMMERCIAL

## (undated) DEVICE — TUBING VACUUM COLLECTION 6FT 23116

## (undated) DEVICE — CUSTOM PACK PERI GYN SMA5BPGHEA

## (undated) DEVICE — PACK MINOR SINGLE BASIN SSK3001

## (undated) DEVICE — PREP SCRUB SOL EXIDINE 4% CHG 4OZ 29002-404

## (undated) DEVICE — DRSG TELFA 3X4" 1050

## (undated) DEVICE — DECANTER VIAL 2006S

## (undated) DEVICE — GLOVE UNDER INDICATOR PI SZ 6.5 LF 41665

## (undated) DEVICE — Device

## (undated) DEVICE — BRIEF STRETCH XL MPS40

## (undated) DEVICE — SOL WATER IRRIG 1000ML BOTTLE 2F7114

## (undated) DEVICE — SUCTION CANNULA UTERINE 12MM CVD  21555

## (undated) DEVICE — GLOVE BIOGEL PI ULTRATOUCH G SZ 6.5 42165

## (undated) RX ORDER — LIDOCAINE HYDROCHLORIDE 10 MG/ML
INJECTION, SOLUTION EPIDURAL; INFILTRATION; INTRACAUDAL; PERINEURAL
Status: DISPENSED
Start: 2022-12-18

## (undated) RX ORDER — DEXAMETHASONE SODIUM PHOSPHATE 4 MG/ML
INJECTION, SOLUTION INTRA-ARTICULAR; INTRALESIONAL; INTRAMUSCULAR; INTRAVENOUS; SOFT TISSUE
Status: DISPENSED
Start: 2022-12-18

## (undated) RX ORDER — ONDANSETRON 2 MG/ML
INJECTION INTRAMUSCULAR; INTRAVENOUS
Status: DISPENSED
Start: 2022-12-18

## (undated) RX ORDER — FENTANYL CITRATE 50 UG/ML
INJECTION, SOLUTION INTRAMUSCULAR; INTRAVENOUS
Status: DISPENSED
Start: 2022-12-18

## (undated) RX ORDER — PROPOFOL 10 MG/ML
INJECTION, EMULSION INTRAVENOUS
Status: DISPENSED
Start: 2022-12-18